# Patient Record
Sex: FEMALE | Race: BLACK OR AFRICAN AMERICAN | Employment: FULL TIME | URBAN - METROPOLITAN AREA
[De-identification: names, ages, dates, MRNs, and addresses within clinical notes are randomized per-mention and may not be internally consistent; named-entity substitution may affect disease eponyms.]

---

## 2017-12-13 ENCOUNTER — INITIAL CONSULT (OUTPATIENT)
Dept: PULMONOLOGY | Age: 37
End: 2017-12-13

## 2017-12-13 VITALS
OXYGEN SATURATION: 98 % | BODY MASS INDEX: 36.98 KG/M2 | SYSTOLIC BLOOD PRESSURE: 118 MMHG | HEIGHT: 68 IN | RESPIRATION RATE: 18 BRPM | WEIGHT: 244 LBS | DIASTOLIC BLOOD PRESSURE: 64 MMHG | HEART RATE: 98 BPM

## 2017-12-13 DIAGNOSIS — Z01.818 PREOP EXAMINATION: Primary | ICD-10-CM

## 2017-12-13 DIAGNOSIS — G47.19 EXCESSIVE DAYTIME SLEEPINESS: ICD-10-CM

## 2017-12-13 DIAGNOSIS — E66.01 MORBID OBESITY (HCC): ICD-10-CM

## 2017-12-13 LAB
DLCO %PRED: NORMAL
DLCO PRE: NORMAL
FEF 25-75%-POST: 2.78
FEF 25-75%-PRE: 2.57
FEV1-POST: 2.23
FEV1-PRE: 2.32
FEV1/FVC-POST: 85.5
FEV1/FVC-PRE: 82.1
FVC-POST: 2.61
FVC-PRE: 2.83
MEP: NORMAL
MIP: NORMAL
TLC %PRED: NORMAL
TLC PRE: NORMAL

## 2017-12-13 PROCEDURE — 99244 OFF/OP CNSLTJ NEW/EST MOD 40: CPT | Performed by: INTERNAL MEDICINE

## 2017-12-13 ASSESSMENT — PULMONARY FUNCTION TESTS
FVC_POST: 2.61
FEV1_PRE: 2.32
FEV1/FVC_POST: 85.5
FEV1_POST: 2.23
FVC_PRE: 2.83
FEV1/FVC_PRE: 82.1

## 2017-12-13 NOTE — PROGRESS NOTES
and general anesthesia. I will obtain outpatient apnea link and if significantly positive would consider a formal sleep study. At this time that should not prevent her from proceeding with surgical intervention. No Follow-up on file. This dictation was performed with a verbal recognition program and it was checked for errors. It is possible that there are still dictated errors within this office note. Any errors should be brought immediately to my attention for correction. All efforts were made to ensure that this office note is accurate.

## 2017-12-18 DIAGNOSIS — E66.01 MORBID OBESITY (HCC): ICD-10-CM

## 2017-12-18 DIAGNOSIS — Z01.818 PREOP EXAMINATION: ICD-10-CM

## 2018-04-11 PROBLEM — Z01.818 PREOP EXAMINATION: Status: RESOLVED | Noted: 2017-12-13 | Resolved: 2018-04-11

## 2018-08-14 PROBLEM — K81.0 ACUTE CHOLECYSTITIS: Status: ACTIVE | Noted: 2018-08-14

## 2018-08-16 PROBLEM — G89.18 ACUTE POST-OPERATIVE PAIN: Status: ACTIVE | Noted: 2018-08-16

## 2019-07-09 ENCOUNTER — HOSPITAL ENCOUNTER (EMERGENCY)
Age: 39
Discharge: HOME | End: 2019-07-10
Payer: COMMERCIAL

## 2019-07-09 ENCOUNTER — APPOINTMENT (OUTPATIENT)
Dept: ULTRASOUND IMAGING | Age: 39
End: 2019-07-09
Payer: COMMERCIAL

## 2019-07-09 ENCOUNTER — APPOINTMENT (OUTPATIENT)
Dept: GENERAL RADIOLOGY | Age: 39
End: 2019-07-09
Payer: COMMERCIAL

## 2019-07-09 ENCOUNTER — APPOINTMENT (OUTPATIENT)
Dept: CT IMAGING | Age: 39
End: 2019-07-09
Payer: COMMERCIAL

## 2019-07-09 VITALS
HEART RATE: 79 BPM | RESPIRATION RATE: 16 BRPM | TEMPERATURE: 98.4 F | OXYGEN SATURATION: 100 % | WEIGHT: 232 LBS | DIASTOLIC BLOOD PRESSURE: 118 MMHG | SYSTOLIC BLOOD PRESSURE: 145 MMHG | BODY MASS INDEX: 35.16 KG/M2 | HEIGHT: 68 IN

## 2019-07-09 DIAGNOSIS — R42 DIZZINESS: ICD-10-CM

## 2019-07-09 DIAGNOSIS — R07.9 CHEST PAIN, UNSPECIFIED TYPE: ICD-10-CM

## 2019-07-09 DIAGNOSIS — R51.9 NONINTRACTABLE HEADACHE, UNSPECIFIED CHRONICITY PATTERN, UNSPECIFIED HEADACHE TYPE: Primary | ICD-10-CM

## 2019-07-09 DIAGNOSIS — R11.0 NAUSEA: ICD-10-CM

## 2019-07-09 LAB
ALBUMIN SERPL-MCNC: 4.3 GM/DL (ref 3.4–5)
ALP BLD-CCNC: 70 IU/L (ref 40–129)
ALT SERPL-CCNC: 15 U/L (ref 10–40)
ANION GAP SERPL CALCULATED.3IONS-SCNC: 10 MMOL/L (ref 4–16)
AST SERPL-CCNC: 19 IU/L (ref 15–37)
BASOPHILS ABSOLUTE: 0 K/CU MM
BASOPHILS RELATIVE PERCENT: 0.2 % (ref 0–1)
BILIRUB SERPL-MCNC: 0.3 MG/DL (ref 0–1)
BUN BLDV-MCNC: 9 MG/DL (ref 6–23)
CALCIUM SERPL-MCNC: 9.5 MG/DL (ref 8.3–10.6)
CHLORIDE BLD-SCNC: 106 MMOL/L (ref 99–110)
CO2: 25 MMOL/L (ref 21–32)
CREAT SERPL-MCNC: 0.9 MG/DL (ref 0.6–1.1)
DIFFERENTIAL TYPE: ABNORMAL
EOSINOPHILS ABSOLUTE: 0.1 K/CU MM
EOSINOPHILS RELATIVE PERCENT: 0.8 % (ref 0–3)
GFR AFRICAN AMERICAN: >60 ML/MIN/1.73M2
GFR NON-AFRICAN AMERICAN: >60 ML/MIN/1.73M2
GLUCOSE BLD-MCNC: 104 MG/DL (ref 70–99)
HCT VFR BLD CALC: 39.4 % (ref 37–47)
HEMOGLOBIN: 13.5 GM/DL (ref 12.5–16)
IMMATURE NEUTROPHIL %: 0.2 % (ref 0–0.43)
LYMPHOCYTES ABSOLUTE: 1.9 K/CU MM
LYMPHOCYTES RELATIVE PERCENT: 21.8 % (ref 24–44)
MCH RBC QN AUTO: 27.9 PG (ref 27–31)
MCHC RBC AUTO-ENTMCNC: 34.3 % (ref 32–36)
MCV RBC AUTO: 81.4 FL (ref 78–100)
MONOCYTES ABSOLUTE: 0.4 K/CU MM
MONOCYTES RELATIVE PERCENT: 4.5 % (ref 0–4)
NUCLEATED RBC %: 0 %
PDW BLD-RTO: 13.8 % (ref 11.7–14.9)
PLATELET # BLD: 249 K/CU MM (ref 140–440)
PMV BLD AUTO: 10.8 FL (ref 7.5–11.1)
POTASSIUM SERPL-SCNC: 3.8 MMOL/L (ref 3.5–5.1)
RBC # BLD: 4.84 M/CU MM (ref 4.2–5.4)
SEGMENTED NEUTROPHILS ABSOLUTE COUNT: 6.3 K/CU MM
SEGMENTED NEUTROPHILS RELATIVE PERCENT: 72.5 % (ref 36–66)
SODIUM BLD-SCNC: 141 MMOL/L (ref 135–145)
TOTAL IMMATURE NEUTOROPHIL: 0.02 K/CU MM
TOTAL NUCLEATED RBC: 0 K/CU MM
TOTAL PROTEIN: 8 GM/DL (ref 6.4–8.2)
TROPONIN T: <0.01 NG/ML
WBC # BLD: 8.6 K/CU MM (ref 4–10.5)

## 2019-07-09 PROCEDURE — 70450 CT HEAD/BRAIN W/O DYE: CPT

## 2019-07-09 PROCEDURE — 99284 EMERGENCY DEPT VISIT MOD MDM: CPT

## 2019-07-09 PROCEDURE — 6370000000 HC RX 637 (ALT 250 FOR IP): Performed by: PHYSICIAN ASSISTANT

## 2019-07-09 PROCEDURE — 84484 ASSAY OF TROPONIN QUANT: CPT

## 2019-07-09 PROCEDURE — 93971 EXTREMITY STUDY: CPT

## 2019-07-09 PROCEDURE — 85025 COMPLETE CBC W/AUTO DIFF WBC: CPT

## 2019-07-09 PROCEDURE — 71045 X-RAY EXAM CHEST 1 VIEW: CPT

## 2019-07-09 PROCEDURE — 2580000003 HC RX 258: Performed by: PHYSICIAN ASSISTANT

## 2019-07-09 PROCEDURE — 96374 THER/PROPH/DIAG INJ IV PUSH: CPT

## 2019-07-09 PROCEDURE — 80053 COMPREHEN METABOLIC PANEL: CPT

## 2019-07-09 PROCEDURE — 6360000002 HC RX W HCPCS: Performed by: PHYSICIAN ASSISTANT

## 2019-07-09 PROCEDURE — 96375 TX/PRO/DX INJ NEW DRUG ADDON: CPT

## 2019-07-09 PROCEDURE — 93005 ELECTROCARDIOGRAM TRACING: CPT | Performed by: PHYSICIAN ASSISTANT

## 2019-07-09 RX ORDER — DEXAMETHASONE SODIUM PHOSPHATE 10 MG/ML
6 INJECTION, SOLUTION INTRAMUSCULAR; INTRAVENOUS ONCE
Status: COMPLETED | OUTPATIENT
Start: 2019-07-09 | End: 2019-07-09

## 2019-07-09 RX ORDER — DIPHENHYDRAMINE HCL 25 MG
25 TABLET ORAL ONCE
Status: COMPLETED | OUTPATIENT
Start: 2019-07-09 | End: 2019-07-09

## 2019-07-09 RX ORDER — 0.9 % SODIUM CHLORIDE 0.9 %
1000 INTRAVENOUS SOLUTION INTRAVENOUS ONCE
Status: COMPLETED | OUTPATIENT
Start: 2019-07-09 | End: 2019-07-10

## 2019-07-09 RX ORDER — METOCLOPRAMIDE HYDROCHLORIDE 5 MG/ML
10 INJECTION INTRAMUSCULAR; INTRAVENOUS ONCE
Status: COMPLETED | OUTPATIENT
Start: 2019-07-09 | End: 2019-07-09

## 2019-07-09 RX ADMIN — SODIUM CHLORIDE 1000 ML: 9 INJECTION, SOLUTION INTRAVENOUS at 23:05

## 2019-07-09 RX ADMIN — METOCLOPRAMIDE 10 MG: 5 INJECTION, SOLUTION INTRAMUSCULAR; INTRAVENOUS at 23:05

## 2019-07-09 RX ADMIN — DEXAMETHASONE SODIUM PHOSPHATE 6 MG: 10 INJECTION, SOLUTION INTRAMUSCULAR; INTRAVENOUS at 23:05

## 2019-07-09 RX ADMIN — DIPHENHYDRAMINE HCL 25 MG: 25 TABLET ORAL at 23:05

## 2019-07-09 ASSESSMENT — PAIN DESCRIPTION - PAIN TYPE: TYPE: ACUTE PAIN

## 2019-07-09 ASSESSMENT — PAIN SCALES - GENERAL: PAINLEVEL_OUTOF10: 10

## 2019-07-09 ASSESSMENT — PAIN DESCRIPTION - LOCATION: LOCATION: HEAD

## 2019-07-09 ASSESSMENT — HEART SCORE: ECG: 0

## 2019-07-10 PROCEDURE — 93010 ELECTROCARDIOGRAM REPORT: CPT | Performed by: INTERNAL MEDICINE

## 2019-07-10 RX ORDER — MECLIZINE HYDROCHLORIDE 25 MG/1
25 TABLET ORAL 3 TIMES DAILY PRN
Qty: 20 TABLET | Refills: 0 | Status: SHIPPED | OUTPATIENT
Start: 2019-07-10 | End: 2019-07-20

## 2019-07-10 RX ORDER — ONDANSETRON 4 MG/1
4 TABLET, ORALLY DISINTEGRATING ORAL EVERY 6 HOURS
Qty: 10 TABLET | Refills: 0 | Status: SHIPPED | OUTPATIENT
Start: 2019-07-10 | End: 2021-06-28

## 2019-07-11 LAB
EKG ATRIAL RATE: 79 BPM
EKG DIAGNOSIS: NORMAL
EKG P AXIS: 39 DEGREES
EKG P-R INTERVAL: 188 MS
EKG Q-T INTERVAL: 364 MS
EKG QRS DURATION: 86 MS
EKG QTC CALCULATION (BAZETT): 417 MS
EKG R AXIS: 1 DEGREES
EKG T AXIS: 9 DEGREES
EKG VENTRICULAR RATE: 79 BPM

## 2020-12-08 PROCEDURE — 36415 COLL VENOUS BLD VENIPUNCTURE: CPT

## 2020-12-08 PROCEDURE — 85025 COMPLETE CBC W/AUTO DIFF WBC: CPT

## 2020-12-08 PROCEDURE — 96375 TX/PRO/DX INJ NEW DRUG ADDON: CPT

## 2020-12-08 PROCEDURE — 99285 EMERGENCY DEPT VISIT HI MDM: CPT

## 2020-12-08 PROCEDURE — 83690 ASSAY OF LIPASE: CPT

## 2020-12-08 PROCEDURE — 96374 THER/PROPH/DIAG INJ IV PUSH: CPT

## 2020-12-08 PROCEDURE — 80053 COMPREHEN METABOLIC PANEL: CPT

## 2020-12-08 PROCEDURE — 82248 BILIRUBIN DIRECT: CPT

## 2020-12-08 RX ORDER — DIPHENHYDRAMINE HYDROCHLORIDE 50 MG/ML
25 INJECTION INTRAMUSCULAR; INTRAVENOUS ONCE
Status: COMPLETED | OUTPATIENT
Start: 2020-12-08 | End: 2020-12-09

## 2020-12-08 RX ORDER — HALOPERIDOL 5 MG/ML
5 INJECTION INTRAMUSCULAR ONCE
Status: COMPLETED | OUTPATIENT
Start: 2020-12-08 | End: 2020-12-09

## 2020-12-08 ASSESSMENT — PAIN DESCRIPTION - LOCATION: LOCATION: ABDOMEN

## 2020-12-08 ASSESSMENT — PAIN SCALES - GENERAL: PAINLEVEL_OUTOF10: 10

## 2020-12-08 ASSESSMENT — PAIN DESCRIPTION - PAIN TYPE: TYPE: ACUTE PAIN

## 2020-12-09 ENCOUNTER — HOSPITAL ENCOUNTER (EMERGENCY)
Age: 40
Discharge: HOME | End: 2020-12-09
Payer: COMMERCIAL

## 2020-12-09 VITALS
OXYGEN SATURATION: 100 % | TEMPERATURE: 98.2 F | RESPIRATION RATE: 18 BRPM | DIASTOLIC BLOOD PRESSURE: 94 MMHG | SYSTOLIC BLOOD PRESSURE: 155 MMHG | WEIGHT: 240 LBS | HEART RATE: 65 BPM | HEIGHT: 68 IN | BODY MASS INDEX: 36.37 KG/M2

## 2020-12-09 LAB
ALBUMIN SERPL-MCNC: 4.3 GM/DL (ref 3.4–5)
ALP BLD-CCNC: 67 IU/L (ref 40–129)
ALT SERPL-CCNC: 10 U/L (ref 10–40)
ANION GAP SERPL CALCULATED.3IONS-SCNC: 11 MMOL/L (ref 4–16)
AST SERPL-CCNC: 16 IU/L (ref 15–37)
BASOPHILS ABSOLUTE: 0 K/CU MM
BASOPHILS RELATIVE PERCENT: 0.2 % (ref 0–1)
BILIRUB SERPL-MCNC: 0.3 MG/DL (ref 0–1)
BILIRUBIN DIRECT: 0.2 MG/DL (ref 0–0.3)
BILIRUBIN, INDIRECT: 0.1 MG/DL (ref 0–0.7)
BUN BLDV-MCNC: 12 MG/DL (ref 6–23)
CALCIUM SERPL-MCNC: 9.1 MG/DL (ref 8.3–10.6)
CHLORIDE BLD-SCNC: 99 MMOL/L (ref 99–110)
CO2: 25 MMOL/L (ref 21–32)
CREAT SERPL-MCNC: 0.9 MG/DL (ref 0.6–1.1)
DIFFERENTIAL TYPE: ABNORMAL
EOSINOPHILS ABSOLUTE: 0.1 K/CU MM
EOSINOPHILS RELATIVE PERCENT: 1.2 % (ref 0–3)
GFR AFRICAN AMERICAN: >60 ML/MIN/1.73M2
GFR NON-AFRICAN AMERICAN: >60 ML/MIN/1.73M2
GLUCOSE BLD-MCNC: 105 MG/DL (ref 70–99)
HCT VFR BLD CALC: 39 % (ref 37–47)
HEMOGLOBIN: 13.9 GM/DL (ref 12.5–16)
IMMATURE NEUTROPHIL %: 0.4 % (ref 0–0.43)
LIPASE: 28 IU/L (ref 13–60)
LYMPHOCYTES ABSOLUTE: 2.4 K/CU MM
LYMPHOCYTES RELATIVE PERCENT: 28.7 % (ref 24–44)
MCH RBC QN AUTO: 28.3 PG (ref 27–31)
MCHC RBC AUTO-ENTMCNC: 35.6 % (ref 32–36)
MCV RBC AUTO: 79.4 FL (ref 78–100)
MONOCYTES ABSOLUTE: 0.5 K/CU MM
MONOCYTES RELATIVE PERCENT: 5.5 % (ref 0–4)
NUCLEATED RBC %: 0 %
PDW BLD-RTO: 13.1 % (ref 11.7–14.9)
PLATELET # BLD: 292 K/CU MM (ref 140–440)
PMV BLD AUTO: 10.1 FL (ref 7.5–11.1)
POTASSIUM SERPL-SCNC: 3.5 MMOL/L (ref 3.5–5.1)
RBC # BLD: 4.91 M/CU MM (ref 4.2–5.4)
SEGMENTED NEUTROPHILS ABSOLUTE COUNT: 5.4 K/CU MM
SEGMENTED NEUTROPHILS RELATIVE PERCENT: 64 % (ref 36–66)
SODIUM BLD-SCNC: 135 MMOL/L (ref 135–145)
TOTAL IMMATURE NEUTOROPHIL: 0.03 K/CU MM
TOTAL NUCLEATED RBC: 0 K/CU MM
TOTAL PROTEIN: 7.9 GM/DL (ref 6.4–8.2)
WBC # BLD: 8.4 K/CU MM (ref 4–10.5)

## 2020-12-09 PROCEDURE — 6360000002 HC RX W HCPCS: Performed by: EMERGENCY MEDICINE

## 2020-12-09 PROCEDURE — 6370000000 HC RX 637 (ALT 250 FOR IP): Performed by: PHYSICIAN ASSISTANT

## 2020-12-09 RX ADMIN — DIPHENHYDRAMINE HYDROCHLORIDE 25 MG: 50 INJECTION INTRAMUSCULAR; INTRAVENOUS at 00:43

## 2020-12-09 RX ADMIN — HALOPERIDOL LACTATE 5 MG: 5 INJECTION, SOLUTION INTRAMUSCULAR at 00:43

## 2020-12-09 RX ADMIN — HYOSCYAMINE SULFATE 125 MCG: 0.12 TABLET ORAL; SUBLINGUAL at 02:34

## 2020-12-09 NOTE — ED NOTES
Reviewed discharge instructions with patient. Patient verbalized understanding. All questions answered.       Jesus Auguste RN  12/09/20 4491

## 2020-12-09 NOTE — ED PROVIDER NOTES
EMERGENCY DEPARTMENT ENCOUNTER      PCP: Jr Swartz PA-C    CHIEF COMPLAINT    Chief Complaint   Patient presents with    Abdominal Pain     x 1 year       This patient was not evaluated by the attending physician. I have independently evaluated this patient. My supervising physician was available for consultation. HPI    Alexis Boyce is a 36 y.o. female who presents with abdominal pain. Onset -about 1 year ago but has been worse the last few days  Location -right lower quadrant of abdomen  Duration -intermittent  Character -cramping, stabbing, \"contraction-like pain\"  Aggravating/Alleviating factors -aggravating factor is eating. Alleviating factors are denied. Patient reports that she was discharged on Linzess today by her GI provider. Associate symptoms -yesterday patient reports that she had nausea and an episode of emesis that was nonbloody and nonbilious but denies any nausea or vomiting today and has been able to tolerate liquids by mouth today  Radiation - does not radiate  Severity - 10/10    Patient denies urinary symptoms, fever, chills, chest pain, shortness of breath, constipation, melena, hematochezia, hematemesis, concern for pregnancy. REVIEW OF SYSTEMS    Constitutional:  Denies fever, chills  HENT:  Denies sore throat or ear pain   Cardiovascular:  Denies chest pain, palpitations or swelling   Respiratory:  Denies cough or shortness of breath   GI:  See HPI above  : No hematuria, urinary urgency, urinary frequency, dysuria. No vaginal discharge or concern for STDs. Musculoskeletal:  Denies back pain or groin pain or masses. No pain or swelling of extremities.   Skin:  Denies rash  Neurologic:  Denies headache, focal weakness or sensory changes   Endocrine:  Denies polyuria or polydypsia   Lymphatic:  Denies swollen glands     All other review of systems are negative  See HPI and nursing notes for additional information     [address]    Past Medical History:   Diagnosis Date    Excessive daytime sleepiness 2017    GERD (gastroesophageal reflux disease)     Hypertension     Morbid obesity ([address] 2017    Preop examination 2017     Past Surgical History:   Procedure Laterality Date     SECTION      CHOLECYSTECTOMY  08/15/2018    laprascopic    UPPER GASTROINTESTINAL ENDOSCOPY  2017       CURRENT MEDICATIONS    Current Outpatient Rx   Medication Sig Dispense Refill    hyoscyamine (LEVSIN/SL) 125 MCG sublingual tablet Place 1 tablet under the tongue every 4 hours as needed for Cramping 42 tablet 0    ondansetron (ZOFRAN ODT) 4 MG disintegrating tablet Take 1 tablet by mouth every 6 hours 10 tablet 0    omeprazole (PRILOSEC) 40 MG delayed release capsule Take 40 mg by mouth daily      norethindrone (NORLYDA) 0.35 MG tablet Take 1 tablet by mouth daily      amLODIPine (NORVASC) 10 MG tablet Take 10 mg by mouth      gabapentin (NEURONTIN) 300 MG capsule Take 300 mg by mouth 3 times daily. [place] hydrochlorothiazide (MICROZIDE) 12.5 MG capsule take 1 capsule daily  3    ibuprofen (ADVIL;MOTRIN) 800 MG tablet Take 1 tablet by mouth every 8 hours as needed.  (Patient taking differently: Take 800 mg by mouth 3 times daily ) 30 tablet 1       ALLERGIES    No Known Allergies    SOCIAL AND FAMILY HISTORY    Social History     Socioeconomic History    Marital status: Single     Spouse name: None    Number of children: None    Years of education: None    Highest education level: None   Occupational History    None   Social Needs    Financial resource strain: None    Food insecurity     Worry: None     Inability: None    Transportation needs     Medical: None     Non-medical: None   Tobacco Use    Smoking status: Never Smoker    Smokeless tobacco: Never Used   Substance and Sexual Activity    Alcohol use: Yes     Comment: social    Drug use: No    Sexual activity: None   Lifestyle    Physical activity     Days per week: None     Minutes per session: None    Stress: None   Relationships    Social connections     Talks on phone: None     Gets together: None     Attends Shinto service: None     Active member of club or organization: None     Attends meetings of clubs or organizations: None     Relationship status: None    Intimate partner violence     Fear of current or ex partner: None     Emotionally abused: None     Physically abused: None     Forced sexual activity: None   Other Topics Concern    None   Social History Narrative    None     Family History   Problem Relation Age of Onset    Hearing Loss Brother     High Blood Pressure Maternal Grandmother     Arthritis Maternal Grandfather        PHYSICAL EXAM    VITAL SIGNS: BP (!) 155/94   Pulse 65   Temp 98.2 °F (36.8 °C)   Resp 18   Ht 5' 8\" (1.727 m)   Wt 240 lb (108.9 kg)   LMP 06/15/2019   SpO2 100%   BMI 36.49 kg/m²   Constitutional:  Well developed, well nourished. No distress  Eyes:  Sclera nonicteric, conjunctiva moist  HENT:  Atraumatic. PERRL. EOMI. Moist mucus membranes. Neck/Lymphatics: supple, no JVD, no swollen nodes  Respiratory:  No retractions, no accessory muscle use, normal breath sounds   Cardiovascular:  normal rate, normal rhythm, no murmurs    GI:    No gross discoloration.       -no Lcaude's sign (periumbilical ecchymosis)       -no Grey-Biggs's sign (flank ecchymosis)    Bowel sounds present, no audible bruits. Soft, no distention, no guarding, no rigidity,   + mild right lower quadrant abdominal tenderness to palpation-no other abdominal tenderness palpation on exam, no rebound tenderness, no palpable pulsatile masses,   No increase tenderness palpation over McBurney's point    Negative Rovsing sign    Negative Blanca's sign. Back:  No CVA tenderness to percussion.   Musculoskeletal:  No edema, no deformity  Vascular: DP pulses 2+ equal bilaterally  Integument: No rash, dry skin  Neurologic:  Alert & oriented, normal speech  Psychiatric: Cooperative, pleasant affect       LABS:  Results for orders placed or performed during the hospital encounter of 12/09/20   CBC auto diff   Result Value Ref Range    WBC 8.4 4.0 - 10.5 K/CU MM    RBC 4.91 4.2 - 5.4 M/CU MM    Hemoglobin 13.9 12.5 - 16.0 GM/DL    Hematocrit 39.0 37 - 47 %    MCV 79.4 78 - 100 FL    MCH 28.3 27 - 31 PG    MCHC 35.6 32.0 - 36.0 %    RDW 13.1 11.7 - 14.9 %    Platelets 218 884 - 159 K/CU MM    MPV 10.1 7.5 - 11.1 FL    Differential Type AUTOMATED DIFFERENTIAL     Segs Relative 64.0 36 - 66 %    Lymphocytes % 28.7 24 - 44 %    Monocytes % 5.5 (H) 0 - 4 %    Eosinophils % 1.2 0 - 3 %    Basophils % 0.2 0 - 1 %    Segs Absolute 5.4 K/CU MM    Lymphocytes Absolute 2.4 K/CU MM    Monocytes Absolute 0.5 K/CU MM    Eosinophils Absolute 0.1 K/CU MM    Basophils Absolute 0.0 K/CU MM    Nucleated RBC % 0.0 %    Total Nucleated RBC 0.0 K/CU MM    Total Immature Neutrophil 0.03 K/CU MM    Immature Neutrophil % 0.4 0 - 0.43 %   BMP   Result Value Ref Range    Sodium 135 135 - 145 MMOL/L    Potassium 3.5 3.5 - 5.1 MMOL/L    Chloride 99 99 - 110 mMol/L    CO2 25 21 - 32 MMOL/L    Anion Gap 11 4 - 16    BUN 12 6 - 23 MG/DL    CREATININE 0.9 0.6 - 1.1 MG/DL    Glucose 105 (H) 70 - 99 MG/DL    Calcium 9.1 8.3 - 10.6 MG/DL    GFR Non-African American >60 >60 mL/min/1.73m2    GFR African American >60 >60 mL/min/1.73m2   Hepatic Function Panel (LFTs)   Result Value Ref Range    Alb 4.3 3.4 - 5.0 GM/DL    Bilirubin, Direct 0.2 0.0 - 0.3 MG/DL    Alkaline Phosphatase 67 40 - 129 IU/L    AST 16 15 - 37 IU/L    ALT 10 10 - 40 U/L    Total Protein 7.9 6.4 - 8.2 GM/DL   Lipase   Result Value Ref Range    Lipase 28 13 - 60 IU/L           RADIOLOGY/PROCEDURES    No orders to display            ED COURSE & MEDICAL DECISION MAKING       Vital signs and nursing notes reviewed during ED course. I have independently evaluated this patient.  Supervising physician present in the Emergency Department, available for consultation, throughout entirety of patient care. Patient presents as above which prompted work-up today. Labs were obtained. Labs are without emergent findings. Patient initially treated with Haldol and Benadryl without improvement in symptoms. Patient reports she has not had any vomiting since yesterday. She is tolerating oral intake. Abdominal exam without peritoneal signs. Patient treated with Levsin and she would like to be discharged home immediately after taking this medication. Patient reports that she just charted Leila Carty today for IBS and she is following with GI. Patient reports her symptoms but have been ongoing for about a year. Patient recently seen in November for similar symptoms at outside hospital with negative CT of abdomen pelvis without evidence of acute appendicitis. Given the patient's symptoms are similar to prior episodes of abdominal pain and she is without peritoneal abdominal findings and without leukocytosis-I do of low clinical suspicion for acute appendicitis. Patient has no increased tenderness palpation over McBurney's point and has negative Rovsing sign. Patient is nontoxic appearing. Vital signs stable. Patient is stable for outpatient management. All pertinent Lab data and radiographic results reviewed with patient at bedside. Patient we discharged home with prescription for Levsin-we discussed medication. The patient and/or the family were informed of the results of any tests/labs/imaging, the treatment plan, and time was allotted to answer questions. Diagnosis, disposition, and treatment plan reviewed in detail with patient. Patient understands and agrees to follow-up with her GI specialist for recheck as soon as possible. Patient understands and agrees to return to emergency department for any new or worsening symptoms - strict return precautions given. Clinical  IMPRESSION    1.  Abdominal pain, unspecified abdominal location Disposition referral (if applicable): Your GI provider    Schedule an appointment as soon as possible for a visit   Recheck as soon as possible    [facility] Emergency Department  [address] [phone]  [phone]  Go to   Return to ED if symptoms worsen or new symptoms      Disposition medications (if applicable):  Discharge Medication List as of 12/9/2020  2:28 AM            Comment: Please note this report has been produced using speech recognition software and may contain errors related to that system including errors in grammar, punctuation, and spelling, as well as words and phrases that may be inappropriate. If there are any questions or concerns please feel free to contact the dictating provider for clarification.         Jeannine Blackmon PA-C  12/09/20 5579

## 2020-12-09 NOTE — ED PROVIDER NOTES
As physician-in-triage, I performed a virtual medical screening history and physical exam on this patient. HISTORY OF PRESENT ILLNESS  Vinicius Mata is a 36 y.o. female who presents to the emergency department with complaints of lower abdominal/pelvic pain. The pain described as a cramping and stabbing pain rated as moderate to severe in severity. The pain occurs and paroxysmal that she likens it to contractions although she is status post hysterectomy. She has associated nausea and vomiting. Has been evaluated for this several times previously with normal CT imaging in November of this year. Denies fevers, chills, vaginal bleeding, vaginal discharge, or dysuria. PHYSICAL EXAM  BP (!) 188/122   Pulse 60   Temp 98.2 °F (36.8 °C)   Resp 18   Ht 5' 8\" (1.727 m)   Wt 240 lb (108.9 kg)   LMP 06/15/2019   SpO2 100%   BMI 36.49 kg/m²     On exam, the patient appears in no acute distress. Speech is clear. Breathing is unlabored.   Moves all extremities    Orders: CBC, BMP, hepatic panel, lipase, urinalysis, Haldol, Benadryl        Everton Arcos DO  12/08/20 2200

## 2021-06-08 ENCOUNTER — TELEPHONE (OUTPATIENT)
Dept: SURGERY | Age: 41
End: 2021-06-08

## 2021-06-24 ENCOUNTER — OFFICE VISIT (OUTPATIENT)
Dept: SURGERY | Age: 41
End: 2021-06-24
Payer: COMMERCIAL

## 2021-06-24 VITALS
BODY MASS INDEX: 35.09 KG/M2 | WEIGHT: 231.5 LBS | SYSTOLIC BLOOD PRESSURE: 144 MMHG | HEART RATE: 73 BPM | HEIGHT: 68 IN | OXYGEN SATURATION: 98 % | TEMPERATURE: 97.2 F | DIASTOLIC BLOOD PRESSURE: 96 MMHG

## 2021-06-24 DIAGNOSIS — K43.2 INCISIONAL HERNIA, WITHOUT OBSTRUCTION OR GANGRENE: Primary | ICD-10-CM

## 2021-06-24 PROCEDURE — G8427 DOCREV CUR MEDS BY ELIG CLIN: HCPCS | Performed by: SURGERY

## 2021-06-24 PROCEDURE — G8417 CALC BMI ABV UP PARAM F/U: HCPCS | Performed by: SURGERY

## 2021-06-24 PROCEDURE — 1036F TOBACCO NON-USER: CPT | Performed by: SURGERY

## 2021-06-24 PROCEDURE — 99204 OFFICE O/P NEW MOD 45 MIN: CPT | Performed by: SURGERY

## 2021-06-24 ASSESSMENT — ENCOUNTER SYMPTOMS
STRIDOR: 0
SORE THROAT: 0
EYE REDNESS: 0
APNEA: 0
BACK PAIN: 0
ABDOMINAL PAIN: 1
CHOKING: 0
EYE ITCHING: 0
RECTAL PAIN: 0
PHOTOPHOBIA: 0
CONSTIPATION: 1
COLOR CHANGE: 0
ANAL BLEEDING: 0

## 2021-06-24 NOTE — PROGRESS NOTES
Chief Complaint   Patient presents with    New Patient     NP RLQ pain ref Dr Jones Ala:  HPI:  Patient complains of abdominal pain. Pain is located in the RLQ with no radiation. The pain is described as colicky, cramping and pressure-like. Onset was several years ago. Symptoms have been gradually worsening since. Aggravating factors: eating and fatty foods. Associated symptoms: constipation. The patient denies chills and fever. I have reviewed the patient's(pertinent information to this visit) medical history, family history(scanned in  the Media tab under \"patient questioner\"), social history and reviewof systems with the patient today in the office.        Past Surgical History:   Procedure Laterality Date     SECTION      CHOLECYSTECTOMY  08/15/2018    laprascopic    UPPER GASTROINTESTINAL ENDOSCOPY  2017     Past Medical History:   Diagnosis Date    Excessive daytime sleepiness 2017    GERD (gastroesophageal reflux disease)     Hypertension     Morbid [address] [address] 2017    Preop examination 2017     Family History   Problem Relation Age of Onset    Hypertension Mother     Hypertension Father     Hearing Loss Brother     High Blood Pressure Maternal Grandmother     Arthritis Maternal Grandfather      Social History     Socioeconomic History    Marital status: Single     Spouse name: Not on file    Number of children: Not on file    Years of education: Not on file    Highest education level: Not on file   Occupational History    Not on file   Tobacco Use    Smoking status: Never Smoker    Smokeless tobacco: Never Used   Substance and Sexual Activity    Alcohol use: Yes     Comment: social    Drug use: No    Sexual activity: Not on file   Other Topics Concern    Not on file   Social History Narrative    Not on file     Social Determinants of Health     Financial Resource Strain: Low Risk     Difficulty of Paying Living Expenses: Not hard for environmental allergies. Neurological: Negative for syncope and speech difficulty. Psychiatric/Behavioral: Negative for confusion and hallucinations. OBJECTIVE:  Physical Exam:    BP (!) 144/96   Pulse 73   Temp 97.2 °F (36.2 °C)   Ht 5' 8\" (1.727 m)   Wt 231 lb 8 oz (105 kg)   LMP 06/15/2019   SpO2 98%   BMI 35.20 kg/m²      Physical Exam  Constitutional:       Appearance: She is well-developed. HENT:      Head: Normocephalic. Eyes:      Pupils: Pupils are equal, round, and reactive to light. Cardiovascular:      Rate and Rhythm: Normal rate. Pulmonary:      Effort: Pulmonary effort is normal.   Abdominal:      General: There is no distension. Palpations: Abdomen is soft. There is no mass. Tenderness: There is abdominal tenderness. There is no guarding or rebound. Musculoskeletal:         General: Normal range of motion. Cervical back: Normal range of motion and neck supple. Skin:     General: Skin is warm. Neurological:      Mental Status: She is alert and oriented to person, place, and time. ASSESSMENT:  1. Incisional hernia, without obstruction or gangrene        PLAN:  Treatment: CT of abdomen done at [facility] reviewed and unsure if she has incisional hernia on the right lower quadrant from her  incision. Patient will need exploratory robotic/laparoscopy for evaluation. If patient has incisional hernia I will repair it at the same time. These findings were discussed with patient and she agrees to proceed. Patient counseled on risks, benefits, and alternatives of treatment plan at length today. Patient states an understanding and willingness to proceed with plan. No orders of the defined types were placed in this encounter. No orders of the defined types were placed in this encounter. Follow Up:  No follow-ups on file.       Emilio Pelayo MD

## 2021-06-28 ENCOUNTER — OFFICE VISIT (OUTPATIENT)
Dept: FAMILY MEDICINE CLINIC | Age: 41
End: 2021-06-28
Payer: COMMERCIAL

## 2021-06-28 VITALS
HEIGHT: 68 IN | SYSTOLIC BLOOD PRESSURE: 126 MMHG | OXYGEN SATURATION: 93 % | BODY MASS INDEX: 34.56 KG/M2 | HEART RATE: 75 BPM | DIASTOLIC BLOOD PRESSURE: 71 MMHG | RESPIRATION RATE: 16 BRPM | WEIGHT: 228 LBS

## 2021-06-28 DIAGNOSIS — E66.09 CLASS 1 OBESITY DUE TO EXCESS CALORIES WITHOUT SERIOUS COMORBIDITY WITH BODY MASS INDEX (BMI) OF 34.0 TO 34.9 IN ADULT: ICD-10-CM

## 2021-06-28 DIAGNOSIS — I10 ESSENTIAL HYPERTENSION: ICD-10-CM

## 2021-06-28 DIAGNOSIS — Z13.1 ENCOUNTER FOR SCREENING FOR DIABETES MELLITUS: ICD-10-CM

## 2021-06-28 DIAGNOSIS — K21.9 GASTROESOPHAGEAL REFLUX DISEASE, UNSPECIFIED WHETHER ESOPHAGITIS PRESENT: ICD-10-CM

## 2021-06-28 DIAGNOSIS — Z76.89 ENCOUNTER TO ESTABLISH CARE: ICD-10-CM

## 2021-06-28 DIAGNOSIS — K58.1 IRRITABLE BOWEL SYNDROME WITH CONSTIPATION: ICD-10-CM

## 2021-06-28 DIAGNOSIS — Z76.89 ENCOUNTER TO ESTABLISH CARE: Primary | ICD-10-CM

## 2021-06-28 LAB
A/G RATIO: 1.3 (ref 1.1–2.2)
ALBUMIN SERPL-MCNC: 4.4 G/DL (ref 3.4–5)
ALP BLD-CCNC: 69 U/L (ref 40–129)
ALT SERPL-CCNC: 7 U/L (ref 10–40)
ANION GAP SERPL CALCULATED.3IONS-SCNC: 14 MMOL/L (ref 3–16)
AST SERPL-CCNC: 23 U/L (ref 15–37)
BASOPHILS ABSOLUTE: 0 K/UL (ref 0–0.2)
BASOPHILS RELATIVE PERCENT: 0.3 %
BILIRUB SERPL-MCNC: 0.5 MG/DL (ref 0–1)
BUN BLDV-MCNC: 10 MG/DL (ref 7–20)
CALCIUM SERPL-MCNC: 9.4 MG/DL (ref 8.3–10.6)
CHLORIDE BLD-SCNC: 103 MMOL/L (ref 99–110)
CHOLESTEROL, FASTING: 151 MG/DL (ref 0–199)
CO2: 22 MMOL/L (ref 21–32)
CREAT SERPL-MCNC: 0.8 MG/DL (ref 0.6–1.1)
EOSINOPHILS ABSOLUTE: 0 K/UL (ref 0–0.6)
EOSINOPHILS RELATIVE PERCENT: 0.6 %
GFR AFRICAN AMERICAN: >60
GFR NON-AFRICAN AMERICAN: >60
GLOBULIN: 3.5 G/DL
GLUCOSE BLD-MCNC: 73 MG/DL (ref 70–99)
HCT VFR BLD CALC: 39 % (ref 36–48)
HDLC SERPL-MCNC: 39 MG/DL (ref 40–60)
HEMOGLOBIN: 13.6 G/DL (ref 12–16)
LDL CHOLESTEROL CALCULATED: 101 MG/DL
LYMPHOCYTES ABSOLUTE: 2.2 K/UL (ref 1–5.1)
LYMPHOCYTES RELATIVE PERCENT: 26.7 %
MCH RBC QN AUTO: 28.5 PG (ref 26–34)
MCHC RBC AUTO-ENTMCNC: 34.9 G/DL (ref 31–36)
MCV RBC AUTO: 81.9 FL (ref 80–100)
MONOCYTES ABSOLUTE: 0.4 K/UL (ref 0–1.3)
MONOCYTES RELATIVE PERCENT: 4.3 %
NEUTROPHILS ABSOLUTE: 5.7 K/UL (ref 1.7–7.7)
NEUTROPHILS RELATIVE PERCENT: 68.1 %
PDW BLD-RTO: 14 % (ref 12.4–15.4)
PLATELET # BLD: 266 K/UL (ref 135–450)
PMV BLD AUTO: 8.9 FL (ref 5–10.5)
POTASSIUM SERPL-SCNC: 4.1 MMOL/L (ref 3.5–5.1)
RBC # BLD: 4.76 M/UL (ref 4–5.2)
SODIUM BLD-SCNC: 139 MMOL/L (ref 136–145)
TOTAL PROTEIN: 7.9 G/DL (ref 6.4–8.2)
TRIGLYCERIDE, FASTING: 57 MG/DL (ref 0–150)
TSH REFLEX FT4: 2.17 UIU/ML (ref 0.27–4.2)
VLDLC SERPL CALC-MCNC: 11 MG/DL
WBC # BLD: 8.4 K/UL (ref 4–11)

## 2021-06-28 PROCEDURE — 99204 OFFICE O/P NEW MOD 45 MIN: CPT | Performed by: STUDENT IN AN ORGANIZED HEALTH CARE EDUCATION/TRAINING PROGRAM

## 2021-06-28 RX ORDER — LISINOPRIL 20 MG/1
TABLET ORAL
COMMUNITY
Start: 2021-06-15 | End: 2022-07-19

## 2021-06-28 RX ORDER — LINACLOTIDE 145 UG/1
CAPSULE, GELATIN COATED ORAL
COMMUNITY
Start: 2021-04-07

## 2021-06-28 SDOH — ECONOMIC STABILITY: FOOD INSECURITY: WITHIN THE PAST 12 MONTHS, YOU WORRIED THAT YOUR FOOD WOULD RUN OUT BEFORE YOU GOT MONEY TO BUY MORE.: NEVER TRUE

## 2021-06-28 SDOH — ECONOMIC STABILITY: FOOD INSECURITY: WITHIN THE PAST 12 MONTHS, THE FOOD YOU BOUGHT JUST DIDN'T LAST AND YOU DIDN'T HAVE MONEY TO GET MORE.: NEVER TRUE

## 2021-06-28 ASSESSMENT — ENCOUNTER SYMPTOMS
NAUSEA: 0
WHEEZING: 0
SORE THROAT: 0
SHORTNESS OF BREATH: 0

## 2021-06-28 ASSESSMENT — PATIENT HEALTH QUESTIONNAIRE - PHQ9
SUM OF ALL RESPONSES TO PHQ9 QUESTIONS 1 & 2: 0
1. LITTLE INTEREST OR PLEASURE IN DOING THINGS: 0
2. FEELING DOWN, DEPRESSED OR HOPELESS: 0
SUM OF ALL RESPONSES TO PHQ QUESTIONS 1-9: 0

## 2021-06-28 ASSESSMENT — SOCIAL DETERMINANTS OF HEALTH (SDOH): HOW HARD IS IT FOR YOU TO PAY FOR THE VERY BASICS LIKE FOOD, HOUSING, MEDICAL CARE, AND HEATING?: NOT HARD AT ALL

## 2021-06-28 NOTE — PROGRESS NOTES
7/2/2021    [address]    Chief Complaint   Patient presents with    New Patient    Abdominal Pain     RLQ Pain seeing Dr. Paola Munoz and Dr. Davey Adkins. HPI  History was obtained from patient. Karolyn Dakins is a 39 y.o. female with a PMHx Fibromyalgia, IBS with constipation, Hypertension, GERD,  s/p hysterectomy, s/p cholecystectomy   who presents today to establish care  No acute complaints, complaint with medications    Follows with Dr. Summer Pratt hx. Of fibromyalgia  Follows with Dr. Paola Munoz IBS. Patient is complaining of RLQ pain, ongoing for years. Pain conisistent for nearly 2 years. Scheduled for exploratory lap with Dr. Davey Adkins next 1-2 months. Has been on levsin for this in the past which did not help. Described as Stabbing pain 'feels like a contraction' cramping, sharp pain that is intermittent. Pain occurs around every 2 hours, cyclic. Somewhat improved with bowel movements. Hx. Of IBS with constipation per patient unable to move bowel without linzess which she takes every other day. Notes pain after meals, roughly 12 pounds lost since 12/8/2020. 3 pregnancies, 2 kids. Lives by herself with 1 child  Works as [employer].       Prior PCP [facility]    1. Encounter to establish care    2. Gastroesophageal reflux disease, unspecified whether esophagitis present    3. Essential hypertension    4. Irritable bowel syndrome with constipation    5. Class 1 obesity due to excess calories without serious comorbidity with body mass index (BMI) of 34.0 to 34.9 in adult    6. Encounter for screening for diabetes mellitus             REVIEW OF SYMPTOMS    Review of Systems   Constitutional: Negative for chills and fatigue. HENT: Negative for congestion and sore throat. Respiratory: Negative for shortness of breath and wheezing. Cardiovascular: Negative for chest pain and palpitations. Gastrointestinal: Positive for abdominal pain. Negative for nausea.         +heartburn Genitourinary: Negative for frequency and urgency. Neurological: Negative for light-headedness. PAST MEDICAL HISTORY  Past Medical History:   Diagnosis Date    Excessive daytime sleepiness 12/13/2017    GERD (gastroesophageal reflux disease)     Hypertension     Morbid obesity ([address] 12/13/2017    Preop examination 12/13/2017       FAMILY HISTORY  Family History   Problem Relation Age of Onset    Hypertension Mother     Hypertension Father     Hearing Loss Brother     High Blood Pressure Maternal Grandmother     Arthritis Maternal Grandfather        SOCIAL HISTORY  Social History     Socioeconomic History    Marital status: Single     Spouse name: None    Number of children: None    Years of education: None    Highest education level: None   Occupational History    None   Tobacco Use    Smoking status: Never Smoker    Smokeless tobacco: Never Used   Substance and Sexual Activity    Alcohol use: Yes     Comment: social    Drug use: No    Sexual activity: None   Other Topics Concern    None   Social History Narrative    None     Social Determinants of Health     Financial Resource Strain: Low Risk     Difficulty of Paying Living Expenses: Not hard at all   Food Insecurity: No Food Insecurity    Worried About Running Out of Food in the Last Year: Never true    Janneth of Food in the Last Year: Never true   Transportation Needs:     Lack of Transportation (Medical):      Lack of Transportation (Non-Medical):    Physical Activity:     Days of Exercise per Week:     Minutes of Exercise per Session:    Stress:     Feeling of Stress :    Social Connections:     Frequency of Communication with Friends and Family:     Frequency of Social Gatherings with Friends and Family:     Attends Yarsanism Services:     Active Member of Clubs or Organizations:     Attends Club or Organization Meetings:     Marital Status:    Intimate Partner Violence:     Fear of Current or Ex-Partner:     Emotionally Abused:     Physically Abused:     Sexually Abused:         SURGICAL HISTORY  Past Surgical History:   Procedure Laterality Date     SECTION      CHOLECYSTECTOMY  08/15/2018    laprascopic    UPPER GASTROINTESTINAL ENDOSCOPY  2017                 CURRENT MEDICATIONS  Current Outpatient Medications   Medication Sig Dispense Refill    LINZESS 145 MCG capsule       lisinopril (PRINIVIL;ZESTRIL) 20 MG tablet       Indomethacin (INDOCIN PO) Take by mouth PRN      Acetaminophen (TYLENOL 8 HOUR PO) Take by mouth      omeprazole (PRILOSEC) 40 MG delayed release capsule Take 40 mg by mouth daily       No current facility-administered medications for this visit. ALLERGIES  No Known Allergies    PHYSICAL EXAM    /71   Pulse 75   Resp 16   Ht 5' 8\" (1.727 m)   Wt 228 lb (103.4 kg)   LMP 06/15/2019   SpO2 93%   BMI 34.67 kg/m²     Physical Exam  Constitutional:       Appearance: Normal appearance. HENT:      Head: Normocephalic and atraumatic. Eyes:      Extraocular Movements: Extraocular movements intact. Pupils: Pupils are equal, round, and reactive to light. Neck:      Comments: No thyromegaly  Cardiovascular:      Rate and Rhythm: Normal rate and regular rhythm. Pulses: Normal pulses. Heart sounds: No murmur heard. No friction rub. No gallop. Pulmonary:      Effort: Pulmonary effort is normal. No respiratory distress. Breath sounds: Normal breath sounds. Abdominal:      General: Abdomen is flat. Bowel sounds are normal.      Palpations: Abdomen is soft. Tenderness: There is abdominal tenderness (rlq). Musculoskeletal:      Cervical back: Neck supple. Skin:     General: Skin is warm and dry. Neurological:      General: No focal deficit present. Mental Status: She is alert. Psychiatric:         Mood and Affect: Mood normal.         Behavior: Behavior normal.         ASSESSMENT & PLAN    1.  Encounter to establish care  -f/u labs  -hold on vaccines today will try to obtain from prior pcp  - TSH WITH REFLEX TO FT4; Future    2. Gastroesophageal reflux disease, unspecified whether esophagitis present  -stable    3. Essential hypertension  -stable on current regimen  - CBC WITH AUTO DIFFERENTIAL; Future  - Comprehensive Metabolic Panel; Future  - Lipid, Fasting; Future  - TSH WITH REFLEX TO FT4; Future    4. Irritable bowel syndrome with constipation  -follows with GI, plan for ex lap continue linzess    5. Class 1 obesity due to excess calories without serious comorbidity with body mass index (BMI) of 34.0 to 34.9 in adult  -counseled on diet/lifestyle  - Lipid, Fasting; Future  - TSH WITH REFLEX TO FT4; Future    6. Encounter for screening for diabetes mellitus      Return in about 3 months (around 9/28/2021).     Electronically signed by Cammy Francisco DO on 7/2/2021

## 2021-07-02 ENCOUNTER — TELEPHONE (OUTPATIENT)
Dept: SURGERY | Age: 41
End: 2021-07-02

## 2021-07-02 ASSESSMENT — ENCOUNTER SYMPTOMS: ABDOMINAL PAIN: 1

## 2021-07-02 NOTE — TELEPHONE ENCOUNTER
SPOKE TO Mart[facility] [address] [facility] [place]) SCHEDULED @ [facility].  NOTIFIED OF DATES, TIMES AND LOCATION    PAT - PHONE ASSESSMENT  SURGERY - 07/16/21 @ 10:00, 8:00 arrival  P/O - Call after discharge to schedule appointment    NPO AFTER MIDNIGHT  No Covid Check Required, Is fully vaccinated with unknown  HOLD BLOOD THINNERS - Not on thinners   SENT

## 2021-07-07 ENCOUNTER — TELEPHONE (OUTPATIENT)
Dept: SURGERY | Age: 41
End: 2021-07-07

## 2021-07-07 NOTE — TELEPHONE ENCOUNTER
Member Info   Member ID:  [id number]  Member Name:  Christina Pettit  Member :  1980  Reference #: [id number]  Reference #: [id number]  Description: Outpatient Elective  Place Of Service: [address] [place] [address]  Submitting Provider: Dwayne Villareal Physician/Medical Doctor (MD) P  Requesting/Ordering Provider: Jack Nieto Physician/Medical Doctor (MD) P  Servicing/Rendering Provider: [address] [place] [address]:   Member Information  Member Name: Bashir Guallpa Id: [id number]  YOB: 1980  Gender: Female  Service Event  Diagnosis Code: K43.2 Incisional hernia without obstruction or gangrene  Procedure: 54525 Exploratory laparotomy, exploratory celiotomy with or without biopsy(s) (separate procedure)  Line #1  Requested Received Date: 2021 7:32:00 AM Requested Units: 2  Start Date of Service: 2021 Authorized Units: 2  End Date of Service: 2021 Status: Approved  Service Event  Diagnosis Code: K43.2 Incisional hernia without obstruction or gangrene  Procedure: 93728 Laparoscopy, surgical; repair initial inguinal hernia  Line #1  Requested Received Date: 2021 7:32:00 AM Requested Units: 2  Start Date of Service: 2021 Authorized Units: 2  End Date of Service: 2021 Status: Approved

## 2021-07-13 NOTE — PROGRESS NOTES
.Surgery 7/16/21, you will be called 7/15/21 with times               1. Do not eat or drink anything after midnight - unless instructed by your doctor prior to surgery. This includes                   no water, chewing gum or mints. 2. Follow your directions as prescribed by the doctor for your procedure and medications. Take Omeprazole in am with a sip. 3. Check with your Doctor regarding stopping Plavix, Coumadin, Lovenox,Effient,Pradaxa,Xarelto, Fragmin or other blood thinners and                   follow their instructions. 4. Do not smoke, and do not drink any alcoholic beverages 24 hours prior to surgery. This includes NA Beer. 5. You may brush your teeth and gargle the morning of surgery. DO NOT SWALLOW WATER   6. You MUST make arrangements for a responsible adult to take you home after your surgery and be able to check on you every couple                   hours for the day. You will not be allowed to leave alone or drive yourself home. It is strongly suggested someone stay with you the first 24                   hrs. Your surgery will be cancelled if you do not have a ride home. 7. Please wear simple, loose fitting clothing to the hospital.  Armani Paulson not bring valuables (money, credit cards, checkbooks, etc.) Do not wear any                   makeup (including no eye makeup) or nail polish on your fingers or toes. 8. DO NOT wear any jewelry or piercings on day of surgery. All body piercing jewelry must be removed. 9. If you have dentures, they will be removed before going to the OR; we will provide you a container. If you wear contact lenses or glasses,                  they will be removed; please bring a case for them. 10. If you  have a Living Will and Durable Power of  for Healthcare, please bring in a copy. 11. Please bring picture ID,  insurance card, paperwork from the doctors office    (H & P, Consent, & card for implantable devices). 12. Take a shower the night before or morning of your procedure, do not apply any lotion, oil or powder. 13. Wear a mask covering your nose & mouth when entering the hospital. Patient is vaccinated. Quarantine yourself after the test until after your surgery.

## 2021-07-14 ENCOUNTER — ANESTHESIA EVENT (OUTPATIENT)
Dept: OPERATING ROOM | Age: 41
End: 2021-07-14
Payer: COMMERCIAL

## 2021-07-14 NOTE — ANESTHESIA PRE PROCEDURE
Department of Anesthesiology  Preprocedure Note       Name:  Elke Macdonald   Age:  39 y.o.  :  1980                                          MRN:  [id number]         Date:  2021      Surgeon: Zoe Beach):  Anthony Maynard MD    Procedure: Procedure(s):  ROBOTIC EXPLORATION AND HERNIA RIGHT INGUINAL REPAIR LAPAROSCOPIC    Medications prior to admission:   Prior to Admission medications    Medication Sig Start Date End Date Taking? Authorizing Provider   LINZESS 145 MCG capsule  21   Historical Provider, MD   lisinopril (PRINIVIL;ZESTRIL) 20 MG tablet  6/15/21   Historical Provider, MD   Indomethacin (INDOCIN PO) Take by mouth PRN    Historical Provider, MD   Acetaminophen (TYLENOL 8 HOUR PO) Take by mouth    Historical Provider, MD   omeprazole (PRILOSEC) 40 MG delayed release capsule Take 40 mg by mouth daily    Historical Provider, MD       Current medications:    No current facility-administered medications for this encounter.      Current Outpatient Medications   Medication Sig Dispense Refill    LINZESS 145 MCG capsule       lisinopril (PRINIVIL;ZESTRIL) 20 MG tablet       Indomethacin (INDOCIN PO) Take by mouth PRN      Acetaminophen (TYLENOL 8 HOUR PO) Take by mouth      omeprazole (PRILOSEC) 40 MG delayed release capsule Take 40 mg by mouth daily         Allergies:  No Known Allergies    Problem List:    Patient Active Problem List   Diagnosis Code    Morbid obesity ([facility] E66.01    Excessive daytime sleepiness G47.19    Acute cholecystitis K81.0    Acute post-operative pain G89.18    Irritable bowel syndrome with constipation K58.1    Essential hypertension I10    Gastroesophageal reflux disease K21.9       Past Medical History:        Diagnosis Date    Excessive daytime sleepiness 2017    GERD (gastroesophageal reflux disease)     Hypertension     Morbid obesity ([facility] [address] 2017       Past Surgical History:        Procedure Laterality Date     SECTION 1998 & 2014    CHOLECYSTECTOMY  08/15/2018    laprascopic    HYSTERECTOMY  2020    UPPER GASTROINTESTINAL ENDOSCOPY  09/11/2017       Social History:    Social History     Tobacco Use    Smoking status: Never Smoker    Smokeless tobacco: Never Used   Substance Use Topics    Alcohol use: Yes     Comment: social                                Counseling given: Not Answered      Vital Signs (Current): There were no vitals filed for this visit. BP Readings from Last 3 Encounters:   06/28/21 126/71   06/24/21 (!) 144/96   12/09/20 (!) 155/94       NPO Status:                                                                                 BMI:   Wt Readings from Last 3 Encounters:   06/28/21 228 lb (103.4 kg)   06/24/21 231 lb 8 oz (105 kg)   12/08/20 240 lb (108.9 kg)     There is no height or weight on file to calculate BMI.    CBC:   Lab Results   Component Value Date    WBC 8.4 06/28/2021    RBC 4.76 06/28/2021    HGB 13.6 06/28/2021    HCT 39.0 06/28/2021    MCV 81.9 06/28/2021    RDW 14.0 06/28/2021     06/28/2021       CMP:   Lab Results   Component Value Date     06/28/2021    K 4.1 06/28/2021     06/28/2021    CO2 22 06/28/2021    BUN 10 06/28/2021    CREATININE 0.8 06/28/2021    GFRAA >60 06/28/2021    AGRATIO 1.3 06/28/2021    LABGLOM >60 06/28/2021    GLUCOSE 73 06/28/2021    PROT 7.9 06/28/2021    PROT 8.8 02/14/2012    CALCIUM 9.4 06/28/2021    BILITOT 0.5 06/28/2021    ALKPHOS 69 06/28/2021    AST 23 06/28/2021    ALT 7 06/28/2021       POC Tests: No results for input(s): POCGLU, POCNA, POCK, POCCL, POCBUN, POCHEMO, POCHCT in the last 72 hours.     Coags: No results found for: PROTIME, INR, APTT    HCG (If Applicable):   Lab Results   Component Value Date    PREGTESTUR NEGATIVE 08/15/2018        ABGs: No results found for: PHART, PO2ART, AEG1ETL, LKT8GLD, BEART, C4OEVGXW     Type & Screen (If Applicable):  No results found for: LABABO, [address]    Drug/Infectious Status (If Applicable):  No results found for: HIV, HEPCAB    COVID-19 Screening (If Applicable): No results found for: COVID19        Anesthesia Evaluation  Patient summary reviewed  Airway:         Dental:          Pulmonary:Negative Pulmonary ROS                              Cardiovascular:    (+) hypertension:,          Beta Blocker:  Not on Beta Blocker         Neuro/Psych:   Negative Neuro/Psych ROS              GI/Hepatic/Renal:   (+) GERD:, morbid obesity          Endo/Other: Negative Endo/Other ROS                    Abdominal:             Vascular: negative vascular ROS. Other Findings:             Anesthesia Plan      general     ASA 2       Induction: intravenous. Alyse Argueta APRN - CRNA   7/14/2021         Pre Anesthesia Assessment complete.  Chart reviewed on 7/14/2021

## 2021-07-15 NOTE — H&P
General Surgery - H&P  JORDY GleasonC      SUBJECTIVE:  HPI:  Patient complains of abdominal pain. Pain is located in the RLQ with no radiation. The pain is described as colicky, cramping and pressure-like. Onset was several years ago. Symptoms have been gradually worsening since. Aggravating factors: eating and fatty foods. Associated symptoms: constipation. The patient denies chills and fever.     I have reviewed the patient's(pertinent information to this visit) medical history, family history(scanned in  the Media tab under \"patient questioner\"), social history and review of systems with the patient in the office.        Past Surgical History         Past Surgical History:   Procedure Laterality Date     SECTION        CHOLECYSTECTOMY   08/15/2018     laprascopic    UPPER GASTROINTESTINAL ENDOSCOPY   2017         Past Medical History        Past Medical History:   Diagnosis Date    Excessive daytime sleepiness 2017    GERD (gastroesophageal reflux disease)      Hypertension      Morbid obesity ([address] 2017    Preop examination 2017         Family History         Family History   Problem Relation Age of Onset    Hypertension Mother      Hypertension Father      Hearing Loss Brother      High Blood Pressure Maternal Grandmother      Arthritis Maternal Grandfather           Social History               Socioeconomic History    Marital status: Single       Spouse name: Not on file    Number of children: Not on file    Years of education: Not on file    Highest education level: Not on file   Occupational History    Not on file   Tobacco Use    Smoking status: Never Smoker    Smokeless tobacco: Never Used   Substance and Sexual Activity    Alcohol use:  Yes       Comment: social    Drug use: No    Sexual activity: Not on file   Other Topics Concern    Not on file   Social History Narrative    Not on file      Social Determinants of Health     Financial Resource Strain: Low Risk     Difficulty of Paying Living Expenses: Not hard at all   Food Insecurity: No Food Insecurity    Worried About Running Out of Food in the Last Year: Never true    Janneth of Food in the Last Year: Never true   Transportation Needs:     Lack of Transportation (Medical):  Lack of Transportation (Non-Medical):    Physical Activity:     Days of Exercise per Week:     Minutes of Exercise per Session:    Stress:     Feeling of Stress :    Social Connections:     Frequency of Communication with Friends and Family:     Frequency of Social Gatherings with Friends and Family:     Attends [employer] Services:     Active Member of Clubs or Organizations:     Attends Club or Organization Meetings:     Marital Status:    Intimate Partner Violence:     Fear of Current or Ex-Partner:     Emotionally Abused:     Physically Abused:     Sexually Abused:             Current Facility-Administered Medications          Current Outpatient Medications   Medication Sig Dispense Refill    LINZESS 145 MCG capsule          lisinopril (PRINIVIL;ZESTRIL) 20 MG tablet          Indomethacin (INDOCIN PO) Take by mouth PRN        Acetaminophen (TYLENOL 8 HOUR PO) Take by mouth        omeprazole (PRILOSEC) 40 MG delayed release capsule Take 40 mg by mouth daily          No current facility-administered medications for this visit.         No Known Allergies     Review of Systems:       Review of Systems   Constitutional: Negative for chills and fever. HENT: Negative for ear pain, mouth sores, sore throat and tinnitus. Eyes: Negative for photophobia, redness and itching. Respiratory: Negative for apnea, choking and stridor. Cardiovascular: Negative for chest pain and palpitations. Gastrointestinal: Positive for abdominal pain and constipation. Negative for anal bleeding and rectal pain. Endocrine: Negative for polydipsia.    Genitourinary: Negative for enuresis, flank pain and hematuria. Musculoskeletal: Negative for back pain, joint swelling and myalgias. Skin: Negative for color change and pallor. Allergic/Immunologic: Negative for environmental allergies. Neurological: Negative for syncope and speech difficulty. Psychiatric/Behavioral: Negative for confusion and hallucinations.         OBJECTIVE:  Physical Exam:    BP (!) 144/96   Pulse 73   Temp 97.2 °F (36.2 °C)   Ht 5' 8\" (1.727 m)   Wt 231 lb 8 oz (105 kg)   LMP 06/15/2019   SpO2 98%   BMI 35.20 kg/m²       Physical Exam  Constitutional:       Appearance: She is well-developed. HENT:      Head: Normocephalic. Eyes:      Pupils: Pupils are equal, round, and reactive to light. Cardiovascular:      Rate and Rhythm: Normal rate. Pulmonary:      Effort: Pulmonary effort is normal.   Abdominal:      General: There is no distension. Palpations: Abdomen is soft. There is no mass. Tenderness: There is abdominal tenderness. There is no guarding or rebound. Musculoskeletal:         General: Normal range of motion. Cervical back: Normal range of motion and neck supple. Skin:     General: Skin is warm. Neurological:      Mental Status: She is alert and oriented to person, place, and time.          ASSESSMENT:  1. Incisional hernia, without obstruction or gangrene          PLAN:  Treatment: Will proceed exploratory robotic/laparoscopy for evaluation, with possible incisional hernia repair. These findings were discussed with patient and she agrees to proceed. Patient counseled on risks, benefits, and alternatives of treatment plan at length today. Patient states an understanding and willingness to proceed with plan        The patient was counseled at length about the risks of terry Covid-19 during their perioperative period and any recovery window from their procedure.   The patient was made aware that terry Covid-19  may worsen their prognosis for recovering from their procedure  and lend to a higher morbidity and/or mortality risk. All material risks, benefits, and reasonable alternatives including postponing the procedure were discussed. The patient does wish to proceed with the procedure at this time.       Sivakumar Sun PA-C

## 2021-07-16 ENCOUNTER — ANESTHESIA (OUTPATIENT)
Dept: OPERATING ROOM | Age: 41
End: 2021-07-16
Payer: COMMERCIAL

## 2021-07-16 ENCOUNTER — HOSPITAL ENCOUNTER (OUTPATIENT)
Age: 41
Setting detail: OUTPATIENT SURGERY
Discharge: HOME | End: 2021-07-16
Attending: SURGERY | Admitting: SURGERY
Payer: COMMERCIAL

## 2021-07-16 VITALS
RESPIRATION RATE: 1 BRPM | DIASTOLIC BLOOD PRESSURE: 97 MMHG | OXYGEN SATURATION: 97 % | SYSTOLIC BLOOD PRESSURE: 197 MMHG | TEMPERATURE: 94.9 F

## 2021-07-16 VITALS
SYSTOLIC BLOOD PRESSURE: 130 MMHG | HEART RATE: 85 BPM | DIASTOLIC BLOOD PRESSURE: 80 MMHG | RESPIRATION RATE: 19 BRPM | TEMPERATURE: 97.5 F | WEIGHT: 230 LBS | BODY MASS INDEX: 34.86 KG/M2 | OXYGEN SATURATION: 98 % | HEIGHT: 68 IN

## 2021-07-16 DIAGNOSIS — K43.9 VENTRAL HERNIA WITHOUT OBSTRUCTION OR GANGRENE: Primary | ICD-10-CM

## 2021-07-16 DIAGNOSIS — K40.90 RIGHT INGUINAL HERNIA: ICD-10-CM

## 2021-07-16 PROCEDURE — 3700000000 HC ANESTHESIA ATTENDED CARE: Performed by: SURGERY

## 2021-07-16 PROCEDURE — 7100000011 HC PHASE II RECOVERY - ADDTL 15 MIN: Performed by: SURGERY

## 2021-07-16 PROCEDURE — S2900 ROBOTIC SURGICAL SYSTEM: HCPCS | Performed by: PHYSICIAN ASSISTANT

## 2021-07-16 PROCEDURE — 2500000003 HC RX 250 WO HCPCS: Performed by: ANESTHESIOLOGY

## 2021-07-16 PROCEDURE — 88302 TISSUE EXAM BY PATHOLOGIST: CPT | Performed by: PATHOLOGY

## 2021-07-16 PROCEDURE — 49652 PR LAP, VENTRAL HERNIA REPAIR,REDUCIBLE: CPT | Performed by: SURGERY

## 2021-07-16 PROCEDURE — S2900 ROBOTIC SURGICAL SYSTEM: HCPCS | Performed by: SURGERY

## 2021-07-16 PROCEDURE — 49650 LAP ING HERNIA REPAIR INIT: CPT | Performed by: PHYSICIAN ASSISTANT

## 2021-07-16 PROCEDURE — 3700000001 HC ADD 15 MINUTES (ANESTHESIA): Performed by: SURGERY

## 2021-07-16 PROCEDURE — C1781 MESH (IMPLANTABLE): HCPCS | Performed by: SURGERY

## 2021-07-16 PROCEDURE — 7100000001 HC PACU RECOVERY - ADDTL 15 MIN: Performed by: SURGERY

## 2021-07-16 PROCEDURE — 2500000003 HC RX 250 WO HCPCS: Performed by: NURSE ANESTHETIST, CERTIFIED REGISTERED

## 2021-07-16 PROCEDURE — 3600000019 HC SURGERY ROBOT ADDTL 15MIN: Performed by: SURGERY

## 2021-07-16 PROCEDURE — 7100000000 HC PACU RECOVERY - FIRST 15 MIN: Performed by: SURGERY

## 2021-07-16 PROCEDURE — 2709999900 HC NON-CHARGEABLE SUPPLY: Performed by: SURGERY

## 2021-07-16 PROCEDURE — 6360000002 HC RX W HCPCS: Performed by: PHYSICIAN ASSISTANT

## 2021-07-16 PROCEDURE — APPNB180 APP NON BILLABLE TIME > 60 MINS: Performed by: PHYSICIAN ASSISTANT

## 2021-07-16 PROCEDURE — 49652 PR LAP, VENTRAL HERNIA REPAIR,REDUCIBLE: CPT | Performed by: PHYSICIAN ASSISTANT

## 2021-07-16 PROCEDURE — 2580000003 HC RX 258: Performed by: ANESTHESIOLOGY

## 2021-07-16 PROCEDURE — 7100000010 HC PHASE II RECOVERY - FIRST 15 MIN: Performed by: SURGERY

## 2021-07-16 PROCEDURE — 2500000003 HC RX 250 WO HCPCS: Performed by: SURGERY

## 2021-07-16 PROCEDURE — 6360000002 HC RX W HCPCS: Performed by: NURSE ANESTHETIST, CERTIFIED REGISTERED

## 2021-07-16 PROCEDURE — 49650 LAP ING HERNIA REPAIR INIT: CPT | Performed by: SURGERY

## 2021-07-16 PROCEDURE — 3600000009 HC SURGERY ROBOT BASE: Performed by: SURGERY

## 2021-07-16 PROCEDURE — 6360000002 HC RX W HCPCS: Performed by: ANESTHESIOLOGY

## 2021-07-16 DEVICE — MESH HERN L W4.1XL6.2IN R POLYPR L PORE KNIT LT 3DMAX: Type: IMPLANTABLE DEVICE | Status: FUNCTIONAL

## 2021-07-16 RX ORDER — HYDROCODONE BITARTRATE AND ACETAMINOPHEN 5; 325 MG/1; MG/1
1 TABLET ORAL EVERY 6 HOURS PRN
Qty: 12 TABLET | Refills: 0 | Status: SHIPPED | OUTPATIENT
Start: 2021-07-16 | End: 2021-07-19

## 2021-07-16 RX ORDER — FENTANYL CITRATE 50 UG/ML
INJECTION, SOLUTION INTRAMUSCULAR; INTRAVENOUS PRN
Status: DISCONTINUED | OUTPATIENT
Start: 2021-07-16 | End: 2021-07-16 | Stop reason: SDUPTHER

## 2021-07-16 RX ORDER — FENTANYL CITRATE 50 UG/ML
50 INJECTION, SOLUTION INTRAMUSCULAR; INTRAVENOUS EVERY 5 MIN PRN
Status: DISCONTINUED | OUTPATIENT
Start: 2021-07-16 | End: 2021-07-16 | Stop reason: HOSPADM

## 2021-07-16 RX ORDER — MIDAZOLAM HYDROCHLORIDE 1 MG/ML
INJECTION INTRAMUSCULAR; INTRAVENOUS PRN
Status: DISCONTINUED | OUTPATIENT
Start: 2021-07-16 | End: 2021-07-16 | Stop reason: SDUPTHER

## 2021-07-16 RX ORDER — DIPHENHYDRAMINE HYDROCHLORIDE 50 MG/ML
12.5 INJECTION INTRAMUSCULAR; INTRAVENOUS
Status: DISCONTINUED | OUTPATIENT
Start: 2021-07-16 | End: 2021-07-16 | Stop reason: HOSPADM

## 2021-07-16 RX ORDER — LABETALOL HYDROCHLORIDE 5 MG/ML
5 INJECTION, SOLUTION INTRAVENOUS EVERY 10 MIN PRN
Status: DISCONTINUED | OUTPATIENT
Start: 2021-07-16 | End: 2021-07-16 | Stop reason: HOSPADM

## 2021-07-16 RX ORDER — LIDOCAINE HYDROCHLORIDE 20 MG/ML
INJECTION, SOLUTION EPIDURAL; INFILTRATION; INTRACAUDAL; PERINEURAL PRN
Status: DISCONTINUED | OUTPATIENT
Start: 2021-07-16 | End: 2021-07-16 | Stop reason: SDUPTHER

## 2021-07-16 RX ORDER — SODIUM CHLORIDE, SODIUM LACTATE, POTASSIUM CHLORIDE, CALCIUM CHLORIDE 600; 310; 30; 20 MG/100ML; MG/100ML; MG/100ML; MG/100ML
INJECTION, SOLUTION INTRAVENOUS CONTINUOUS
Status: DISCONTINUED | OUTPATIENT
Start: 2021-07-16 | End: 2021-07-16 | Stop reason: HOSPADM

## 2021-07-16 RX ORDER — HYDROMORPHONE HCL 110MG/55ML
0.5 PATIENT CONTROLLED ANALGESIA SYRINGE INTRAVENOUS EVERY 5 MIN PRN
Status: DISCONTINUED | OUTPATIENT
Start: 2021-07-16 | End: 2021-07-16 | Stop reason: HOSPADM

## 2021-07-16 RX ORDER — MEPERIDINE HYDROCHLORIDE 25 MG/ML
12.5 INJECTION INTRAMUSCULAR; INTRAVENOUS; SUBCUTANEOUS EVERY 5 MIN PRN
Status: DISCONTINUED | OUTPATIENT
Start: 2021-07-16 | End: 2021-07-16 | Stop reason: HOSPADM

## 2021-07-16 RX ORDER — BUPIVACAINE HYDROCHLORIDE 5 MG/ML
INJECTION, SOLUTION EPIDURAL; INTRACAUDAL
Status: COMPLETED | OUTPATIENT
Start: 2021-07-16 | End: 2021-07-16

## 2021-07-16 RX ORDER — HYDROCODONE BITARTRATE AND ACETAMINOPHEN 5; 325 MG/1; MG/1
2 TABLET ORAL PRN
Status: DISCONTINUED | OUTPATIENT
Start: 2021-07-16 | End: 2021-07-16 | Stop reason: HOSPADM

## 2021-07-16 RX ORDER — HYDROCODONE BITARTRATE AND ACETAMINOPHEN 5; 325 MG/1; MG/1
1 TABLET ORAL PRN
Status: DISCONTINUED | OUTPATIENT
Start: 2021-07-16 | End: 2021-07-16 | Stop reason: HOSPADM

## 2021-07-16 RX ORDER — ROCURONIUM BROMIDE 10 MG/ML
INJECTION, SOLUTION INTRAVENOUS PRN
Status: DISCONTINUED | OUTPATIENT
Start: 2021-07-16 | End: 2021-07-16 | Stop reason: SDUPTHER

## 2021-07-16 RX ORDER — PROMETHAZINE HYDROCHLORIDE 25 MG/ML
6.25 INJECTION, SOLUTION INTRAMUSCULAR; INTRAVENOUS
Status: DISCONTINUED | OUTPATIENT
Start: 2021-07-16 | End: 2021-07-16 | Stop reason: HOSPADM

## 2021-07-16 RX ORDER — LABETALOL HYDROCHLORIDE 5 MG/ML
INJECTION, SOLUTION INTRAVENOUS PRN
Status: DISCONTINUED | OUTPATIENT
Start: 2021-07-16 | End: 2021-07-16 | Stop reason: SDUPTHER

## 2021-07-16 RX ORDER — DEXAMETHASONE SODIUM PHOSPHATE 4 MG/ML
INJECTION, SOLUTION INTRA-ARTICULAR; INTRALESIONAL; INTRAMUSCULAR; INTRAVENOUS; SOFT TISSUE PRN
Status: DISCONTINUED | OUTPATIENT
Start: 2021-07-16 | End: 2021-07-16 | Stop reason: SDUPTHER

## 2021-07-16 RX ORDER — ONDANSETRON 2 MG/ML
INJECTION INTRAMUSCULAR; INTRAVENOUS PRN
Status: DISCONTINUED | OUTPATIENT
Start: 2021-07-16 | End: 2021-07-16 | Stop reason: SDUPTHER

## 2021-07-16 RX ORDER — HYDRALAZINE HYDROCHLORIDE 20 MG/ML
5 INJECTION INTRAMUSCULAR; INTRAVENOUS EVERY 10 MIN PRN
Status: DISCONTINUED | OUTPATIENT
Start: 2021-07-16 | End: 2021-07-16 | Stop reason: HOSPADM

## 2021-07-16 RX ORDER — METOCLOPRAMIDE HYDROCHLORIDE 5 MG/ML
10 INJECTION INTRAMUSCULAR; INTRAVENOUS
Status: DISCONTINUED | OUTPATIENT
Start: 2021-07-16 | End: 2021-07-16 | Stop reason: HOSPADM

## 2021-07-16 RX ORDER — PROPOFOL 10 MG/ML
INJECTION, EMULSION INTRAVENOUS PRN
Status: DISCONTINUED | OUTPATIENT
Start: 2021-07-16 | End: 2021-07-16 | Stop reason: SDUPTHER

## 2021-07-16 RX ADMIN — ONDANSETRON 4 MG: 2 INJECTION INTRAMUSCULAR; INTRAVENOUS at 09:35

## 2021-07-16 RX ADMIN — PROPOFOL 200 MG: 10 INJECTION, EMULSION INTRAVENOUS at 09:30

## 2021-07-16 RX ADMIN — FENTANYL CITRATE 50 MCG: 50 INJECTION, SOLUTION INTRAMUSCULAR; INTRAVENOUS at 09:48

## 2021-07-16 RX ADMIN — LABETALOL HYDROCHLORIDE 5 MG: 5 INJECTION, SOLUTION INTRAVENOUS at 11:08

## 2021-07-16 RX ADMIN — SODIUM CHLORIDE, POTASSIUM CHLORIDE, SODIUM LACTATE AND CALCIUM CHLORIDE: 600; 310; 30; 20 INJECTION, SOLUTION INTRAVENOUS at 08:24

## 2021-07-16 RX ADMIN — FENTANYL CITRATE 50 MCG: 50 INJECTION, SOLUTION INTRAMUSCULAR; INTRAVENOUS at 10:36

## 2021-07-16 RX ADMIN — SODIUM CHLORIDE, POTASSIUM CHLORIDE, SODIUM LACTATE AND CALCIUM CHLORIDE: 600; 310; 30; 20 INJECTION, SOLUTION INTRAVENOUS at 10:43

## 2021-07-16 RX ADMIN — MIDAZOLAM 2 MG: 1 INJECTION INTRAMUSCULAR; INTRAVENOUS at 09:15

## 2021-07-16 RX ADMIN — FENTANYL CITRATE 50 MCG: 50 INJECTION, SOLUTION INTRAMUSCULAR; INTRAVENOUS at 09:28

## 2021-07-16 RX ADMIN — LABETALOL HYDROCHLORIDE 5 MG: 5 INJECTION, SOLUTION INTRAVENOUS at 09:59

## 2021-07-16 RX ADMIN — DEXAMETHASONE SODIUM PHOSPHATE 8 MG: 4 INJECTION, SOLUTION INTRAMUSCULAR; INTRAVENOUS at 09:35

## 2021-07-16 RX ADMIN — HYDROMORPHONE HYDROCHLORIDE 0.5 MG: 2 INJECTION INTRAMUSCULAR; INTRAVENOUS; SUBCUTANEOUS at 11:11

## 2021-07-16 RX ADMIN — LABETALOL HYDROCHLORIDE 5 MG: 5 INJECTION, SOLUTION INTRAVENOUS at 09:42

## 2021-07-16 RX ADMIN — LABETALOL HYDROCHLORIDE 5 MG: 5 INJECTION, SOLUTION INTRAVENOUS at 10:58

## 2021-07-16 RX ADMIN — CEFAZOLIN 2000 MG: 10 INJECTION, POWDER, FOR SOLUTION INTRAVENOUS at 09:19

## 2021-07-16 RX ADMIN — FENTANYL CITRATE 50 MCG: 50 INJECTION, SOLUTION INTRAMUSCULAR; INTRAVENOUS at 09:24

## 2021-07-16 RX ADMIN — ROCURONIUM BROMIDE 50 MG: 10 INJECTION INTRAVENOUS at 09:30

## 2021-07-16 RX ADMIN — LIDOCAINE HYDROCHLORIDE 100 MG: 20 INJECTION, SOLUTION EPIDURAL; INFILTRATION; INTRACAUDAL; PERINEURAL at 09:30

## 2021-07-16 RX ADMIN — HYDRALAZINE HYDROCHLORIDE 5 MG: 20 INJECTION INTRAMUSCULAR; INTRAVENOUS at 11:15

## 2021-07-16 RX ADMIN — HYDRALAZINE HYDROCHLORIDE 5 MG: 20 INJECTION INTRAMUSCULAR; INTRAVENOUS at 11:25

## 2021-07-16 RX ADMIN — SUGAMMADEX 200 MG: 100 INJECTION, SOLUTION INTRAVENOUS at 10:36

## 2021-07-16 ASSESSMENT — PULMONARY FUNCTION TESTS
PIF_VALUE: 35
PIF_VALUE: 35
PIF_VALUE: 29
PIF_VALUE: 35
PIF_VALUE: 20
PIF_VALUE: 30
PIF_VALUE: 34
PIF_VALUE: 32
PIF_VALUE: 34
PIF_VALUE: 8
PIF_VALUE: 34
PIF_VALUE: 2
PIF_VALUE: 2
PIF_VALUE: 29
PIF_VALUE: 2
PIF_VALUE: 26
PIF_VALUE: 36
PIF_VALUE: 34
PIF_VALUE: 34
PIF_VALUE: 2
PIF_VALUE: 29
PIF_VALUE: 34
PIF_VALUE: 35
PIF_VALUE: 30
PIF_VALUE: 33
PIF_VALUE: 34
PIF_VALUE: 30
PIF_VALUE: 34
PIF_VALUE: 35
PIF_VALUE: 34
PIF_VALUE: 29
PIF_VALUE: 34
PIF_VALUE: 34
PIF_VALUE: 33
PIF_VALUE: 6
PIF_VALUE: 29
PIF_VALUE: 24
PIF_VALUE: 22
PIF_VALUE: 29
PIF_VALUE: 31
PIF_VALUE: 1
PIF_VALUE: 29
PIF_VALUE: 32
PIF_VALUE: 29
PIF_VALUE: 29
PIF_VALUE: 35
PIF_VALUE: 25
PIF_VALUE: 29
PIF_VALUE: 19
PIF_VALUE: 34
PIF_VALUE: 29
PIF_VALUE: 34
PIF_VALUE: 24
PIF_VALUE: 2
PIF_VALUE: 25
PIF_VALUE: 34
PIF_VALUE: 34
PIF_VALUE: 24
PIF_VALUE: 29
PIF_VALUE: 29
PIF_VALUE: 34
PIF_VALUE: 2
PIF_VALUE: 2
PIF_VALUE: 34
PIF_VALUE: 19
PIF_VALUE: 35
PIF_VALUE: 2
PIF_VALUE: 25
PIF_VALUE: 29
PIF_VALUE: 31
PIF_VALUE: 35
PIF_VALUE: 30
PIF_VALUE: 1
PIF_VALUE: 1
PIF_VALUE: 30
PIF_VALUE: 2
PIF_VALUE: 34
PIF_VALUE: 2
PIF_VALUE: 2
PIF_VALUE: 34
PIF_VALUE: 34
PIF_VALUE: 33
PIF_VALUE: 30
PIF_VALUE: 29
PIF_VALUE: 30
PIF_VALUE: 2
PIF_VALUE: 2
PIF_VALUE: 24
PIF_VALUE: 29

## 2021-07-16 ASSESSMENT — PAIN DESCRIPTION - FREQUENCY: FREQUENCY: CONTINUOUS

## 2021-07-16 ASSESSMENT — PAIN DESCRIPTION - DESCRIPTORS
DESCRIPTORS: ACHING
DESCRIPTORS: SORE

## 2021-07-16 ASSESSMENT — PAIN DESCRIPTION - PAIN TYPE
TYPE: SURGICAL PAIN
TYPE: SURGICAL PAIN

## 2021-07-16 ASSESSMENT — PAIN SCALES - GENERAL
PAINLEVEL_OUTOF10: 0
PAINLEVEL_OUTOF10: 6
PAINLEVEL_OUTOF10: 5
PAINLEVEL_OUTOF10: 4
PAINLEVEL_OUTOF10: 4
PAINLEVEL_OUTOF10: 6

## 2021-07-16 ASSESSMENT — PAIN DESCRIPTION - ONSET: ONSET: ON-GOING

## 2021-07-16 ASSESSMENT — PAIN - FUNCTIONAL ASSESSMENT: PAIN_FUNCTIONAL_ASSESSMENT: 0-10

## 2021-07-16 ASSESSMENT — PAIN DESCRIPTION - LOCATION
LOCATION: ABDOMEN

## 2021-07-16 ASSESSMENT — PAIN DESCRIPTION - PROGRESSION
CLINICAL_PROGRESSION: GRADUALLY IMPROVING
CLINICAL_PROGRESSION: GRADUALLY IMPROVING

## 2021-07-16 NOTE — PROGRESS NOTES
1051- pt arrived from OR, monitors applied. Report received from [facility] and [facility]. Respirations even and unlabored. Abdominal incisions well approximated with surgical glue dry and intact. Abdomen soft. Abdominal binder in place. VSS. 1110- pt more awake and re-orients easily. Complaining of surgiacl pain 6/10, pain medication given. Pt denies nausea, not interested in ice chips at this time. Weaning O2 as patient tolerates. VSS  1140- pt taking ice chips, tolerating room air well and states pain is improving. Pt to [facility], report given to [facility] RN at bedside.

## 2021-07-16 NOTE — ANESTHESIA PRE PROCEDURE
Department of Anesthesiology  Preprocedure Note       Name:  Sanjay Amor   Age:  39 y.o.  :  1980                                          MRN:  [id number]         Date:  2021      Surgeon: Kika Carter):  Franc Lovett MD    Procedure: Procedure(s):  ROBOTIC EXPLORATION AND HERNIA RIGHT INGUINAL REPAIR LAPAROSCOPIC    Medications prior to admission:   Prior to Admission medications    Medication Sig Start Date End Date Taking?  Authorizing Provider   LINCHAOS 145 MCG capsule  21   Historical Provider, MD   lisinopril (PRINIVIL;ZESTRIL) 20 MG tablet  6/15/21   Historical Provider, MD   Indomethacin (INDOCIN PO) Take by mouth PRN    Historical Provider, MD   Acetaminophen (TYLENOL 8 HOUR PO) Take by mouth    Historical Provider, MD   omeprazole (PRILOSEC) 40 MG delayed release capsule Take 40 mg by mouth daily    Historical Provider, MD       Current medications:    Current Facility-Administered Medications   Medication Dose Route Frequency Provider Last Rate Last Admin    ceFAZolin (ANCEF) 2000 mg in dextrose 5 % 100 mL IVPB  2,000 mg Intravenous Once Lei Fan PA-C           Allergies:  No Known Allergies    Problem List:    Patient Active Problem List   Diagnosis Code    Morbid obesity ([facility] [address] E66.01    Excessive daytime sleepiness G47.19    Acute cholecystitis K81.0    Acute post-operative pain G89.18    Irritable bowel syndrome with constipation K58.1    Essential hypertension I10    Gastroesophageal reflux disease K21.9       Past Medical History:        Diagnosis Date    Excessive daytime sleepiness 2017    GERD (gastroesophageal reflux disease)     Hypertension     Morbid obesity ([facility] [address] 2017       Past Surgical History:        Procedure Laterality Date   [address] & 2014    CHOLECYSTECTOMY  08/15/2018    laprascopic    HYSTERECTOMY  2020    UPPER GASTROINTESTINAL ENDOSCOPY  2017       Social History:    Social History     Tobacco Use    Smoking status: Never Smoker    Smokeless tobacco: Never Used   Substance Use Topics    Alcohol use: Yes     Comment: social                                Counseling given: Not Answered      Vital Signs (Current):   Vitals:    07/16/21 0815   BP: (!) 153/95   Pulse: 73   Resp: 16   Temp: 36.1 °C (97 °F)   TempSrc: Temporal   SpO2: 100%   Weight: 230 lb (104.3 kg)   Height: 5' 8\" (1.727 m)                                              BP Readings from Last 3 Encounters:   07/16/21 (!) 153/95   06/28/21 126/71   06/24/21 (!) 144/96       NPO Status:                                                                                 BMI:   Wt Readings from Last 3 Encounters:   07/16/21 230 lb (104.3 kg)   06/28/21 228 lb (103.4 kg)   06/24/21 231 lb 8 oz (105 kg)     Body mass index is 34.97 kg/m². CBC:   Lab Results   Component Value Date    WBC 8.4 06/28/2021    RBC 4.76 06/28/2021    HGB 13.6 06/28/2021    HCT 39.0 06/28/2021    MCV 81.9 06/28/2021    RDW 14.0 06/28/2021     06/28/2021       CMP:   Lab Results   Component Value Date     06/28/2021    K 4.1 06/28/2021     06/28/2021    CO2 22 06/28/2021    BUN 10 06/28/2021    CREATININE 0.8 06/28/2021    GFRAA >60 06/28/2021    AGRATIO 1.3 06/28/2021    LABGLOM >60 06/28/2021    GLUCOSE 73 06/28/2021    PROT 7.9 06/28/2021    PROT 8.8 02/14/2012    CALCIUM 9.4 06/28/2021    BILITOT 0.5 06/28/2021    ALKPHOS 69 06/28/2021    AST 23 06/28/2021    ALT 7 06/28/2021       POC Tests: No results for input(s): POCGLU, POCNA, POCK, POCCL, POCBUN, POCHEMO, POCHCT in the last 72 hours.     Coags: No results found for: PROTIME, INR, APTT    HCG (If Applicable):   Lab Results   Component Value Date    PREGTESTUR NEGATIVE 08/15/2018        ABGs: No results found for: PHART, PO2ART, MNK0ZMV, UNF3RYJ, BEART, S9TUBBNG     Type & Screen (If Applicable):  No results found for: LABABO, LABRH    Drug/Infectious Status (If Applicable):  No results found for: HIV, HEPCAB    COVID-19 Screening (If Applicable): No results found for: COVID19        Anesthesia Evaluation  Patient summary reviewed  Airway: Mallampati: II  TM distance: >3 FB   Neck ROM: full  Mouth opening: > = 3 FB Dental:          Pulmonary:Negative Pulmonary ROS                              Cardiovascular:  Exercise tolerance: no interval change,   (+) hypertension:,          Beta Blocker:  Not on Beta Blocker      ROS comment: EKG from 2019: Normal sinus rhythm   Minimal voltage criteria for LVH, may be normal variant   Borderline ECG   When compared with ECG of 14-AUG-2018 09:39,   No significant change was found   Confirmed by [facility] Jacinta NORRIS ([id number]) on 7/10/2019 12:18:26 PM      Neuro/Psych:   Negative Neuro/Psych ROS              GI/Hepatic/Renal:   (+) GERD:, morbid obesity          Endo/Other: Negative Endo/Other ROS                    Abdominal:   (+) obese,     Abdomen: soft. Vascular: negative vascular ROS. Other Findings:           Anesthesia Plan      general     ASA 3       Induction: intravenous. MIPS: Prophylactic antiemetics administered. Anesthetic plan and risks discussed with patient. Use of blood products discussed with patient whom. Plan discussed with CRNA. Attending anesthesiologist reviewed and agrees with Preprocedure content              JOANNE Dozier - CRNA   7/16/2021         Pre Anesthesia Assessment complete.  Chart reviewed on 7/16/2021

## 2021-07-16 NOTE — PROGRESS NOTES
1140- Patient returned to [facility], report given to this nurse from [facility] in PACU. Patient A&O able to make needs known   1144- Patient sipping water. Renu Pena 647 at bedside. 1235- IV removed, patient sitting up on side of bed getting dressed  this nurse and mother Dariela Leavitt at bedside. 1245- Discharge instructions given to patient and her mother both verbalized understanding. [phone]- Patient escorted to car [address] where mother is transporting home.

## 2021-07-16 NOTE — OP NOTE
Operative Report    Patient ID:  iD Tripp  [id number]  39 y.o.  1980      Pre-operative Diagnosis: Right Inguinal hernia     Post-operative Diagnosis: same, incisional hernia    Procedure:   1. Robotic-Assisted Right LAURA inguinal hernia repair with mesh     2. Robotic assisted incisional hernia repair      3. Partial removal of right round ligament    Surgeon: Ivette Armstrong MD    First Assistant: Saritha Gonzales PA-C  The use of a first assistant was necessary for the proper positioning, prepping, and draping of the patient, intraoperative retraction, passing sutures and implants(like mesh), stapling bowel and vessels using  devises when necessary, and suction using laparoscopic instruments, exchanging DaVinci robotic instruments, passing sutures and closure of skin and subcutaneous tissues. Findings:  As above    Estimated Blood Loss:  Minimal           Total IV Fluids: 500 ml            Complications:  None; patient tolerated the procedure well. Disposition: PACU - hemodynamically stable. Condition: stable    Implants:    Implant Name Type Inv. Item Serial No.  Lot No. LRB No. Used Action   MESH JERRELL L W4.1XL6.2IN R POLYPR L PORE KNIT LT 3DMAX  MESH JERRELL L W4.1XL6.2IN R POLYPR L PORE KNIT LT 3DMAX  [facility]- [id number] Right 1 Implanted        Procedure Details: The patient was seen again in the Holding Room. The risks, benefits, complications, treatment options, and expected outcomes were discussed with the patient. The possibilities of reaction to medication, pulmonary aspiration, perforation of viscus, bleeding, recurrent infection, the need for additional procedures, and development of a complication requiring transfusion or further operation were discussed with the patient and/or family. There was concurrence with the proposed plan, and informed consent was obtained. The site of surgery was properly noted/marked.  The patient was taken to the Operating Room and the procedure verified. A Time Out was held and the above information confirmed. Indications: symptomatic  Right Inguinal hernia    Description of Procedure: The patient was brought to the operating room, and the site of surgery was verified. The patient was then placed supine with the arms tucked at the sides. After obtaining adequate anesthesia, the patients abdomen was prepped and draped in a standard sterile fashion. The patient was placed in the Trendelenburg position. Using a 5-mm optical trocar, the right mid abdominal quadrant was entered without incident. Pneumoperitoneum was created with insufflation to 12 mmHg. Two additional 8-mm trocar and 8-mm trocar were placed lateral to the rectus sheath under direct vision. The robot was docked without difficulty. There was omental attachment to the abdominal wall and once dissected there was an incisional hernia noted. Both inguinal regions were inspected. There was an incisional hernia and right inguina hernia noted. Then the median and medial umbilical ligaments were divided sharply with electrocautery to achieve the optimal exposure. The preperitoneum was incised with endoscopic scissors along a line of 2 cm above the superior edge of the hernia defect, extending from the median umbilical ligament to the anterior-superior iliac spine. The  peritoneal flap was mobilized inferiorly along with blunt and sharp dissection. The inferior epigastric vessels were exposed, and the pubic symphysis was identified. Coopers ligament was dissected to its junction with the femoral vein. The dissection was continued inferiorly to the iliopubic tract with care taken to avoid injury to the femoral branch of the genitofemoral nerve and the lateral femoral cutaneous nerve. The indirect inguinal hernia sac was identified and reduced with gentle traction. Pt has had hysterectomy arturo part of her round ligament was protruding into this.  The indirect inguinal hernia sac was also noted to be small and was easily mobilized from the round ligament was removed to provide good repair. This was sent to pathology as a specimen. A large 3-D max was rolled longitudinally and inserted through the trocar. The cylinder was placed along the inferior aspect of the working space and rolled into the place to completely cover the direct, indirect, and femoral spaces. The mesh was secured using 0- Vicryl in two places. The preperitoneal flap was closed over the mesh using the O-vicryl as well. Then the incisional hernia was repaired using #1 vicryl without mesh. After ensuring the adequate hemostasis using electrocautery, the trocars were removed and the pneumoperitoneum evacuated. The trocar incision was closed using 4-0 Vicryl, and dry dressings were applied as the final dressing. The patient tolerated the procedure well and was taken to the post-anesthesia care unit in stable condition. Instrument and lap counts were correct at the end of the case.      Kayla Baker MD

## 2021-07-16 NOTE — ANESTHESIA POSTPROCEDURE EVALUATION
Department of Anesthesiology  Postprocedure Note    Patient: Vinicius Mata  MRN: [id number]  YOB: 1980  Date of evaluation: 7/16/2021  Time:  10:58 AM     Procedure Summary     Date: 07/16/21 Room / Location: [address] [place] [address]    Anesthesia Start: [address] Anesthesia Stop: 1178    Procedure: ROBOTIC EXPLORATION, HERNIA RIGHT INGUINAL REPAIR AND VENTRAL HERNIA REPAIR LAPAROSCOPIC (Right Abdomen) Diagnosis: (RIGHT INGUINAL HERNIA)    Surgeons: Myrna Baker MD Responsible Provider: Alexandria Primrose, DO    Anesthesia Type: general ASA Status: 3          Anesthesia Type: general    Marge Phase I:      Marge Phase II:      Last vitals: Reviewed and per EMR flowsheets.        Anesthesia Post Evaluation    Patient location during evaluation: PACU  Patient participation: complete - patient participated  Level of consciousness: awake and alert  Pain score: 0  Airway patency: patent  Nausea & Vomiting: no nausea and no vomiting  Complications: no  Cardiovascular status: blood pressure returned to baseline and hemodynamically stable  Respiratory status: acceptable, spontaneous ventilation, nonlabored ventilation and face mask  Hydration status: stable

## 2021-07-28 ENCOUNTER — TELEPHONE (OUTPATIENT)
Dept: SURGERY | Age: 41
End: 2021-07-28

## 2021-07-28 NOTE — TELEPHONE ENCOUNTER
Its not very likely that it is related to the procedure, but Im happy to evaluate her tomorrow if she would like.

## 2021-07-28 NOTE — TELEPHONE ENCOUNTER
Patient called complaining of feet feel hot. Around her toes area. She is scheduled for her PO 8/2/21. She is not sure if this is an issue from her surgery she had on 7/16.  Please advise

## 2021-08-02 ENCOUNTER — OFFICE VISIT (OUTPATIENT)
Dept: SURGERY | Age: 41
End: 2021-08-02

## 2021-08-02 VITALS
HEART RATE: 55 BPM | BODY MASS INDEX: 34.86 KG/M2 | SYSTOLIC BLOOD PRESSURE: 120 MMHG | OXYGEN SATURATION: 91 % | DIASTOLIC BLOOD PRESSURE: 80 MMHG | WEIGHT: 230 LBS | HEIGHT: 68 IN

## 2021-08-02 DIAGNOSIS — Z09 POSTOPERATIVE EXAMINATION: ICD-10-CM

## 2021-08-02 DIAGNOSIS — K43.2 INCISIONAL HERNIA, WITHOUT OBSTRUCTION OR GANGRENE: Primary | ICD-10-CM

## 2021-08-02 PROCEDURE — 99024 POSTOP FOLLOW-UP VISIT: CPT | Performed by: SURGERY

## 2021-08-02 NOTE — PROGRESS NOTES
Chief Complaint   Patient presents with    Post-Op Check     21 ROBOTIC EXPLORATION, HERNIA RIGHT INGUINAL REPAIR AND VENTRAL HERNIA REPAIR LAPAROSCOPIC         SUBJECTIVE:  Patient here for post op visit. Pain is minimal.  Wounds: minbruising and no discharge.     Past Surgical History:   Procedure Laterality Date     SECTION       &     CHOLECYSTECTOMY  08/15/2018    laprascopic    HERNIA REPAIR Right 2021    ROBOTIC EXPLORATION, HERNIA RIGHT INGUINAL REPAIR AND VENTRAL HERNIA REPAIR LAPAROSCOPIC performed by Yane Lang MD at [address] [place] [address]    UPPER GASTROINTESTINAL ENDOSCOPY  2017     Past Medical History:   Diagnosis Date    Excessive daytime sleepiness 2017    GERD (gastroesophageal reflux disease)     Hypertension     Morbid obesity ([address] 2017     Family History   Problem Relation Age of Onset    Hypertension Mother     Hypertension Father     Hearing Loss Brother     High Blood Pressure Maternal Grandmother     Arthritis Maternal Grandfather      Social History     Socioeconomic History    Marital status: Single     Spouse name: Not on file    Number of children: Not on file    Years of education: Not on file    Highest education level: Not on file   Occupational History    Not on file   Tobacco Use    Smoking status: Never Smoker    Smokeless tobacco: Never Used   Vaping Use    Vaping Use: Never used   Substance and Sexual Activity    Alcohol use: Yes     Comment: social    Drug use: No    Sexual activity: Not on file   Other Topics Concern    Not on file   Social History Narrative    Not on file     Social Determinants of Health     Financial Resource Strain: Low Risk     Difficulty of Paying Living Expenses: Not hard at all   Food Insecurity: No Food Insecurity    Worried About Running Out of Food in the Last Year: Never true    Janneth of Food in the Last Year: Never true   Transportation Needs:     Lack of Transportation (Medical):  Lack of Transportation (Non-Medical):    Physical Activity:     Days of Exercise per Week:     Minutes of Exercise per Session:    Stress:     Feeling of Stress :    Social Connections:     Frequency of Communication with Friends and Family:     Frequency of Social Gatherings with Friends and Family:     Attends [employer] Services:     Active Member of Clubs or Organizations:     Attends Club or Organization Meetings:     Marital Status:    Intimate Partner Violence:     Fear of Current or Ex-Partner:     Emotionally Abused:     Physically Abused:     Sexually Abused:        OBJECTIVE:   Physical Exam    Wound well healed without signs of active infection. Suture line intact. Abdomen soft, nontender, nondistended. Path reveals:   Final Pathologic Diagnosis:   Soft tissue, round ligament (side not specified), excision:   -     Leiomyoma (0.6 cm). -     Smooth muscle and fibrovascular tissue. ASSESSMENT:  Patient doing well on this post operative check. Wounds well healed. 1. Incisional hernia, without obstruction or gangrene        PLAN:  F/u in 6 months  Continue same  Increase activity as tolerated        No orders of the defined types were placed in this encounter. No orders of the defined types were placed in this encounter. Follow Up: No follow-ups on file.     Joyce Yeager MD

## 2021-08-16 ENCOUNTER — OFFICE VISIT (OUTPATIENT)
Dept: SURGERY | Age: 41
End: 2021-08-16

## 2021-08-16 ENCOUNTER — TELEPHONE (OUTPATIENT)
Dept: SURGERY | Age: 41
End: 2021-08-16

## 2021-08-16 VITALS — HEART RATE: 71 BPM | HEIGHT: 68 IN | OXYGEN SATURATION: 99 % | BODY MASS INDEX: 34.86 KG/M2 | WEIGHT: 230 LBS

## 2021-08-16 DIAGNOSIS — Z09 POSTOPERATIVE EXAMINATION: Primary | ICD-10-CM

## 2021-08-16 PROCEDURE — 99024 POSTOP FOLLOW-UP VISIT: CPT | Performed by: SURGERY

## 2021-08-24 ENCOUNTER — OFFICE VISIT (OUTPATIENT)
Dept: OBGYN | Age: 41
End: 2021-08-24
Payer: COMMERCIAL

## 2021-08-24 VITALS
HEIGHT: 68 IN | SYSTOLIC BLOOD PRESSURE: 140 MMHG | BODY MASS INDEX: 35.61 KG/M2 | WEIGHT: 235 LBS | DIASTOLIC BLOOD PRESSURE: 103 MMHG

## 2021-08-24 DIAGNOSIS — N74 FEMALE PELVIC INFLAMMATORY DISORDERS IN DISEASES CLASSIFIED ELSEWHERE: ICD-10-CM

## 2021-08-24 DIAGNOSIS — Z01.419 WOMEN'S ANNUAL ROUTINE GYNECOLOGICAL EXAMINATION: Primary | ICD-10-CM

## 2021-08-24 DIAGNOSIS — N89.8 VAGINAL DISCHARGE: ICD-10-CM

## 2021-08-24 DIAGNOSIS — Z12.31 SCREENING MAMMOGRAM, ENCOUNTER FOR: ICD-10-CM

## 2021-08-24 PROCEDURE — 99396 PREV VISIT EST AGE 40-64: CPT | Performed by: OBSTETRICS & GYNECOLOGY

## 2021-08-24 ASSESSMENT — ENCOUNTER SYMPTOMS
ALLERGIC/IMMUNOLOGIC NEGATIVE: 1
RESPIRATORY NEGATIVE: 1
EYES NEGATIVE: 1
GASTROINTESTINAL NEGATIVE: 1

## 2021-08-24 NOTE — PROGRESS NOTES
8/24/21    Pieter LANDEROS Vic  1980    Chief Complaint   Patient presents with    Gynecologic Exam     annual exam, had hyster with [facility] , currently sexually active, c/o vaginal itching . ns        The patient is a 39 y.o. female, [id number] who presents for her annual exam. She is not menopausal . She reports no gynecological symptoms. She has had a hysterectomy with removal of right ovary. She is  sexually active. Pap smear history: Her last PAP smear was in prior to hysterectomy and normal.  Her results were normal.    Breast history: her most recent mammogram was in 2020.   The results were: Normal    Past Medical History:   Diagnosis Date    Excessive daytime sleepiness 12/13/2017    Fibromyalgia     GERD (gastroesophageal reflux disease)     Hernia of abdominal wall     Herpes simplex virus (HSV) infection     Hypertension     Hypertension     Morbid obesity ([address] 12/13/2017    Obesity        Past Surgical History:   Procedure Laterality Date   [address] & 2014    CHOLECYSTECTOMY  08/15/2018    laprascopic    COLONOSCOPY      DILATION AND CURETTAGE      ENDOMETRIAL ABLATION      HERNIA REPAIR Right 7/16/2021    ROBOTIC EXPLORATION, HERNIA RIGHT INGUINAL REPAIR AND VENTRAL HERNIA REPAIR LAPAROSCOPIC performed by Leodan Rodriguez MD at [address] [place] [address]    OVARY REMOVAL Right     SALPINGECTOMY Bilateral     UPPER GASTROINTESTINAL ENDOSCOPY  09/11/2017       Family History   Problem Relation Age of Onset    Hypertension Mother     Hypertension Father     Hearing Loss Brother     High Blood Pressure Maternal Grandmother     Arthritis Maternal Grandfather        Social History     Tobacco Use    Smoking status: Never Smoker    Smokeless tobacco: Never Used   Vaping Use    Vaping Use: Never used   Substance Use Topics    Alcohol use: Yes     Comment: social    Drug use: No       Current Outpatient Medications   Medication Sig Dispense Refill    LINZESS 145 MCG capsule       lisinopril (PRINIVIL;ZESTRIL) 20 MG tablet       Indomethacin (INDOCIN PO) Take by mouth PRN      Acetaminophen (TYLENOL 8 HOUR PO) Take by mouth      omeprazole (PRILOSEC) 40 MG delayed release capsule Take 40 mg by mouth daily (Patient not taking: Reported on 8/24/2021)       No current facility-administered medications for this visit. No Known Allergies    [id number]    Immunization History   Administered Date(s) Administered    COVID-19, Pfizer, PF, 30mcg/0.3mL 03/23/2021, 04/13/2021    MMR 03/24/2008    Tdap (Boostrix, Adacel) 03/24/2008       Review of Systems   Constitutional: Negative. Eyes: Negative. Respiratory: Negative. Cardiovascular: Negative. Gastrointestinal: Negative. Endocrine: Negative. Genitourinary: Positive for vaginal discharge. Musculoskeletal: Negative. Skin: Negative. Allergic/Immunologic: Negative. Neurological: Negative. Hematological: Negative. Psychiatric/Behavioral: Negative. BP (!) 140/103   Ht 5' 8\" (1.727 m)   Wt 235 lb (106.6 kg)   LMP 06/15/2019   BMI 35.73 kg/m²     Physical Exam  Exam conducted with a chaperone present. Constitutional:       Appearance: Normal appearance. HENT:      Head: Normocephalic and atraumatic. Nose: Nose normal.   Cardiovascular:      Rate and Rhythm: Normal rate. Pulses: Normal pulses. Pulmonary:      Effort: Pulmonary effort is normal.   Chest:      Chest wall: No mass, lacerations, deformity, swelling or tenderness. Breasts:         Right: Normal. No swelling, bleeding, inverted nipple, mass, nipple discharge, skin change or tenderness. Left: Normal. No swelling, bleeding, inverted nipple, mass, nipple discharge, skin change or tenderness. Abdominal:      General: Abdomen is flat. There is no distension. Palpations: Abdomen is soft. There is no mass. Tenderness: There is no abdominal tenderness.

## 2021-08-25 LAB
C. TRACHOMATIS DNA ,URINE: NEGATIVE
CANDIDA SPECIES, DNA PROBE: ABNORMAL
GARDNERELLA VAGINALIS, DNA PROBE: ABNORMAL
N. GONORRHOEAE DNA, URINE: NEGATIVE
TRICHOMONAS VAGINALIS DNA: ABNORMAL

## 2021-08-25 RX ORDER — METRONIDAZOLE 500 MG/1
500 TABLET ORAL 2 TIMES DAILY
Qty: 14 TABLET | Refills: 0 | Status: SHIPPED | OUTPATIENT
Start: 2021-08-25 | End: 2021-09-01

## 2021-09-06 NOTE — PROGRESS NOTES
Chief Complaint   Patient presents with    Post-Op Check     2nd p/o robot right inguinial hernia repair with incision repair and partial removal of right round ligmanet on 2021. SUBJECTIVE:  Patient here for post op visit. Pain is minimal.  Wounds: minbruising and no discharge.     Past Surgical History:   Procedure Laterality Date     SECTION       &     CHOLECYSTECTOMY  08/15/2018    laprascopic    COLONOSCOPY      DILATION AND CURETTAGE      ENDOMETRIAL ABLATION      HERNIA REPAIR Right 2021    ROBOTIC EXPLORATION, HERNIA RIGHT INGUINAL REPAIR AND VENTRAL HERNIA REPAIR LAPAROSCOPIC performed by Anthony Maynard MD at [address] [place] [address]    OVARY REMOVAL Right     SALPINGECTOMY Bilateral     UPPER GASTROINTESTINAL ENDOSCOPY  2017     Past Medical History:   Diagnosis Date    Excessive daytime sleepiness 2017    Fibromyalgia     GERD (gastroesophageal reflux disease)     Hernia of abdominal wall     Herpes simplex virus (HSV) infection     Hypertension     Hypertension     Morbid obesity ([address] 2017    Obesity      Family History   Problem Relation Age of Onset    Hypertension Mother     Hypertension Father     Hearing Loss Brother     High Blood Pressure Maternal Grandmother     Arthritis Maternal Grandfather      Social History     Socioeconomic History    Marital status: Single     Spouse name: Not on file    Number of children: Not on file    Years of education: Not on file    Highest education level: Not on file   Occupational History    Not on file   Tobacco Use    Smoking status: Never Smoker    Smokeless tobacco: Never Used   Vaping Use    Vaping Use: Never used   Substance and Sexual Activity    Alcohol use: Yes     Comment: social    Drug use: No    Sexual activity: Not on file   Other Topics Concern    Not on file   Social History Narrative    Not on file     Social Determinants of Health Financial Resource Strain: Low Risk     Difficulty of Paying Living Expenses: Not hard at all   Food Insecurity: No Food Insecurity    Worried About Running Out of Food in the Last Year: Never true    Janneth of Food in the Last Year: Never true   Transportation Needs:     Lack of Transportation (Medical):  Lack of Transportation (Non-Medical):    Physical Activity:     Days of Exercise per Week:     Minutes of Exercise per Session:    Stress:     Feeling of Stress :    Social Connections:     Frequency of Communication with Friends and Family:     Frequency of Social Gatherings with Friends and Family:     Attends [employer] Services:     Active Member of Clubs or Organizations:     Attends Club or Organization Meetings:     Marital Status:    Intimate Partner Violence:     Fear of Current or Ex-Partner:     Emotionally Abused:     Physically Abused:     Sexually Abused:        OBJECTIVE:   Physical Exam    Wound well healed without signs of active infection. Suture line intact. Abdomen soft, nontender, nondistended. ASSESSMENT:  Patient doing well on this post operative check. Wounds well healed. No diagnosis found. PLAN:  Continue same  Increase activity as tolerated        No orders of the defined types were placed in this encounter. No orders of the defined types were placed in this encounter. Follow Up: No follow-ups on file.     Tony Angel MD

## 2021-09-16 ENCOUNTER — TELEPHONE (OUTPATIENT)
Dept: OBGYN | Age: 41
End: 2021-09-16

## 2021-09-16 RX ORDER — VALACYCLOVIR HYDROCHLORIDE 500 MG/1
500 TABLET, FILM COATED ORAL 2 TIMES DAILY
Qty: 20 TABLET | Refills: 3 | Status: SHIPPED | OUTPATIENT
Start: 2021-09-16 | End: 2022-07-19

## 2022-01-03 ENCOUNTER — OFFICE VISIT (OUTPATIENT)
Dept: OBGYN | Age: 42
End: 2022-01-03
Payer: COMMERCIAL

## 2022-01-03 VITALS
DIASTOLIC BLOOD PRESSURE: 128 MMHG | HEIGHT: 68 IN | WEIGHT: 249 LBS | SYSTOLIC BLOOD PRESSURE: 169 MMHG | BODY MASS INDEX: 37.74 KG/M2 | HEART RATE: 83 BPM

## 2022-01-03 DIAGNOSIS — R03.0 ELEVATED BLOOD PRESSURE READING: ICD-10-CM

## 2022-01-03 DIAGNOSIS — N74 FEMALE PELVIC INFLAMMATORY DISORDERS IN DISEASES CLASSIFIED ELSEWHERE: ICD-10-CM

## 2022-01-03 DIAGNOSIS — Z12.31 SCREENING MAMMOGRAM FOR BREAST CANCER: ICD-10-CM

## 2022-01-03 DIAGNOSIS — N89.8 VAGINAL DISCHARGE: Primary | ICD-10-CM

## 2022-01-03 PROCEDURE — 1036F TOBACCO NON-USER: CPT | Performed by: OBSTETRICS & GYNECOLOGY

## 2022-01-03 PROCEDURE — 99214 OFFICE O/P EST MOD 30 MIN: CPT | Performed by: OBSTETRICS & GYNECOLOGY

## 2022-01-03 PROCEDURE — G8417 CALC BMI ABV UP PARAM F/U: HCPCS | Performed by: OBSTETRICS & GYNECOLOGY

## 2022-01-03 PROCEDURE — 36415 COLL VENOUS BLD VENIPUNCTURE: CPT | Performed by: OBSTETRICS & GYNECOLOGY

## 2022-01-03 PROCEDURE — G8427 DOCREV CUR MEDS BY ELIG CLIN: HCPCS | Performed by: OBSTETRICS & GYNECOLOGY

## 2022-01-03 PROCEDURE — G8484 FLU IMMUNIZE NO ADMIN: HCPCS | Performed by: OBSTETRICS & GYNECOLOGY

## 2022-01-03 RX ORDER — METRONIDAZOLE 500 MG/1
500 TABLET ORAL 2 TIMES DAILY
Qty: 14 TABLET | Refills: 0 | Status: SHIPPED | OUTPATIENT
Start: 2022-01-03 | End: 2022-01-10

## 2022-01-03 ASSESSMENT — ENCOUNTER SYMPTOMS
EYES NEGATIVE: 1
GASTROINTESTINAL NEGATIVE: 1
ALLERGIC/IMMUNOLOGIC NEGATIVE: 1
RESPIRATORY NEGATIVE: 1

## 2022-01-03 NOTE — PROGRESS NOTES
1/3/22    Sejal Mckeon N Vic  1980    Chief Complaint   Patient presents with    Vaginal Discharge     clear vaginal discharge with odor x 2 wks.          Donn Murphy is a 39 y.o. female who presents today for evaluation of vaginal discharge        Past Medical History:   Diagnosis Date    Excessive daytime sleepiness 12/13/2017    Fibromyalgia     GERD (gastroesophageal reflux disease)     Hernia of abdominal wall     Herpes simplex virus (HSV) infection     Hypertension     Hypertension     Morbid obesity ([address] 12/13/2017    Obesity        Past Surgical History:   Procedure Laterality Date   [address] & [address]    CHOLECYSTECTOMY  08/15/2018    laprascopic    COLONOSCOPY      DILATION AND CURETTAGE      ENDOMETRIAL ABLATION      HERNIA REPAIR Right 7/16/2021    ROBOTIC EXPLORATION, HERNIA RIGHT INGUINAL REPAIR AND VENTRAL HERNIA REPAIR LAPAROSCOPIC performed by Silver Youssef MD at [address] [place] [address]    LAV    OVARY REMOVAL Right     SALPINGECTOMY Bilateral     UPPER GASTROINTESTINAL ENDOSCOPY  09/11/2017       Social History     Tobacco Use    Smoking status: Never Smoker    Smokeless tobacco: Never Used   Vaping Use    Vaping Use: Never used   Substance Use Topics    Alcohol use: Yes     Comment: social    Drug use: No       Family History   Problem Relation Age of Onset    Hypertension Mother     Hypertension Father     Hearing Loss Brother     High Blood Pressure Maternal Grandmother     Arthritis Maternal Grandfather        Current Outpatient Medications   Medication Sig Dispense Refill    Blood Pressure Monitoring (SPHYGMOMANOMETER) MISC 1 each by Does not apply route daily 1 each 0    metroNIDAZOLE (FLAGYL) 500 MG tablet Take 1 tablet by mouth 2 times daily for 7 days 14 tablet 0    valACYclovir (VALTREX) 500 MG tablet Take 1 tablet by mouth 2 times daily 20 tablet 3    LINZESS 145 MCG capsule       lisinopril (PRINIVIL;ZESTRIL) 20 No prolapse, urethral pain or urethral lesion. Vagina: Vaginal discharge present. No erythema, tenderness or lesions. Cervix: No cervical motion tenderness, discharge, friability, lesion, erythema or cervical bleeding. Uterus: Normal.       Adnexa: Right adnexa normal and left adnexa normal.        Right: No mass or tenderness. Left: No mass or tenderness. Musculoskeletal:      Cervical back: Normal range of motion and neck supple. Lymphadenopathy:      Lower Body: No right inguinal adenopathy. No left inguinal adenopathy. Skin:     General: Skin is warm and dry. Neurological:      General: No focal deficit present. Mental Status: She is alert and oriented to person, place, and time. No results found for this visit on 22. ASSESSMENT AND PLAN   Diagnosis Orders   1. Vaginal discharge  Vaginal Pathogens Probes *A    C.trachomatis N.gonorrhoeae DNA    metroNIDAZOLE (FLAGYL) 500 MG tablet   2. Female pelvic inflammatory disorders in diseases classified elsewhere  Vaginal Pathogens Probes *A    C.trachomatis N.gonorrhoeae DNA    metroNIDAZOLE (FLAGYL) 500 MG tablet   3. Elevated blood pressure reading  CBC Auto Differential    Comprehensive Metabolic Panel    TSH WITH REFLEX TO FT4   4. Screening mammogram for breast cancer  RANDY DIGITAL SCREEN W OR WO CAD BILATERAL     Patient reports she had stressful day. Client  today. Stress at work. Recommend she see pcp. Home bp monitoring. To er for chest pain, headache, shortness of breath, visual changes    Return in about 1 week (around 1/10/2022).     Cas Ramirez MD

## 2022-01-04 LAB
C TRACH DNA GENITAL QL NAA+PROBE: NEGATIVE
CANDIDA SPECIES, DNA PROBE: NORMAL
GARDNERELLA VAGINALIS, DNA PROBE: NORMAL
N. GONORRHOEAE DNA: NEGATIVE
TRICHOMONAS VAGINALIS DNA: NORMAL

## 2022-01-11 ENCOUNTER — TELEPHONE (OUTPATIENT)
Dept: FAMILY MEDICINE CLINIC | Age: 42
End: 2022-01-11

## 2022-01-11 NOTE — TELEPHONE ENCOUNTER
----- Message from Reba Leblanc sent at 2022 11:44 AM EST -----  Subject: Appointment Request    Reason for Call: New Patient Request Appointment    QUESTIONS  Type of Appointment? Established Patient  Reason for appointment request? No appointments available during search  Additional Information for Provider? Patient called in trying to schedule   a new patient appointment, ex=stablishing care with Dr. Krunal Adorno. She   also had questions about her blood pressure. There were no appointments   available during search. Please reach out to schedule  ---------------------------------------------------------------------------  --------------  CALL BACK INFO  What is the best way for the office to contact you? OK to leave message on   voicemail  Preferred Call Back Phone Number? [phone]  ---------------------------------------------------------------------------  --------------  SCRIPT ANSWERS  Relationship to Patient? Self  Specialty Confirmation? Primary Care  Is this the first appointment to establish care for a ? No  Have you been diagnosed with, awaiting test results for, or told that you   are suspected of having COVID-19 (Coronavirus)? (If patient has tested   negative or was tested as a requirement for work, school, or travel and   not based on symptoms, answer no)? No  Within the past two weeks have you developed any of the following symptoms   (answer no if symptoms have been present longer than 2 weeks or began   more than 2 weeks ago)? Fever or Chills, Cough, Shortness of breath or   difficulty breathing, Loss of taste or smell, Sore throat, Nasal   congestion, Sneezing or runny nose, Fatigue or generalized body aches   (answer no if pain is specific to a body part e.g. back pain), Diarrhea,   Headache? No  Have you had close contact with someone with COVID-19 in the last 14 days? No  (Service Expert  click yes below to proceed with [facility]ual   Scheduling)?  Yes

## 2022-01-12 ENCOUNTER — OFFICE VISIT (OUTPATIENT)
Dept: FAMILY MEDICINE CLINIC | Age: 42
End: 2022-01-12
Payer: COMMERCIAL

## 2022-01-12 VITALS
HEART RATE: 72 BPM | OXYGEN SATURATION: 98 % | RESPIRATION RATE: 16 BRPM | SYSTOLIC BLOOD PRESSURE: 132 MMHG | HEIGHT: 68 IN | WEIGHT: 248 LBS | DIASTOLIC BLOOD PRESSURE: 79 MMHG | BODY MASS INDEX: 37.59 KG/M2

## 2022-01-12 DIAGNOSIS — I10 ESSENTIAL HYPERTENSION: Primary | ICD-10-CM

## 2022-01-12 PROCEDURE — 99213 OFFICE O/P EST LOW 20 MIN: CPT | Performed by: STUDENT IN AN ORGANIZED HEALTH CARE EDUCATION/TRAINING PROGRAM

## 2022-01-12 PROCEDURE — G8484 FLU IMMUNIZE NO ADMIN: HCPCS | Performed by: STUDENT IN AN ORGANIZED HEALTH CARE EDUCATION/TRAINING PROGRAM

## 2022-01-12 PROCEDURE — 1036F TOBACCO NON-USER: CPT | Performed by: STUDENT IN AN ORGANIZED HEALTH CARE EDUCATION/TRAINING PROGRAM

## 2022-01-12 PROCEDURE — G8417 CALC BMI ABV UP PARAM F/U: HCPCS | Performed by: STUDENT IN AN ORGANIZED HEALTH CARE EDUCATION/TRAINING PROGRAM

## 2022-01-12 PROCEDURE — G8427 DOCREV CUR MEDS BY ELIG CLIN: HCPCS | Performed by: STUDENT IN AN ORGANIZED HEALTH CARE EDUCATION/TRAINING PROGRAM

## 2022-01-12 RX ORDER — AMLODIPINE BESYLATE 5 MG/1
5 TABLET ORAL DAILY
Qty: 30 TABLET | Refills: 5 | Status: SHIPPED | OUTPATIENT
Start: 2022-01-12 | End: 2022-02-21 | Stop reason: SDUPTHER

## 2022-01-17 LAB
A/G RATIO: 1.2 (ref 1.1–2.2)
ALBUMIN SERPL-MCNC: 4.3 G/DL (ref 3.4–5)
ALP BLD-CCNC: 79 U/L (ref 40–129)
ALT SERPL-CCNC: 18 U/L (ref 10–40)
ANION GAP SERPL CALCULATED.3IONS-SCNC: 13 MMOL/L (ref 3–16)
AST SERPL-CCNC: 22 U/L (ref 15–37)
BASOPHILS ABSOLUTE: 0 K/UL (ref 0–0.2)
BASOPHILS RELATIVE PERCENT: 0.5 %
BILIRUB SERPL-MCNC: 0.4 MG/DL (ref 0–1)
BUN BLDV-MCNC: 13 MG/DL (ref 7–20)
CALCIUM SERPL-MCNC: 9.9 MG/DL (ref 8.3–10.6)
CHLORIDE BLD-SCNC: 101 MMOL/L (ref 99–110)
CO2: 25 MMOL/L (ref 21–32)
CREAT SERPL-MCNC: 0.8 MG/DL (ref 0.6–1.1)
EOSINOPHILS ABSOLUTE: 0.1 K/UL (ref 0–0.6)
EOSINOPHILS RELATIVE PERCENT: 0.9 %
GFR AFRICAN AMERICAN: >60
GFR NON-AFRICAN AMERICAN: >60
GLUCOSE BLD-MCNC: 91 MG/DL (ref 70–99)
HCT VFR BLD CALC: 41.6 % (ref 36–48)
HEMOGLOBIN: 14.4 G/DL (ref 12–16)
LYMPHOCYTES ABSOLUTE: 1.7 K/UL (ref 1–5.1)
LYMPHOCYTES RELATIVE PERCENT: 27.3 %
MCH RBC QN AUTO: 28.2 PG (ref 26–34)
MCHC RBC AUTO-ENTMCNC: 34.6 G/DL (ref 31–36)
MCV RBC AUTO: 81.5 FL (ref 80–100)
MONOCYTES ABSOLUTE: 0.3 K/UL (ref 0–1.3)
MONOCYTES RELATIVE PERCENT: 4.7 %
NEUTROPHILS ABSOLUTE: 4 K/UL (ref 1.7–7.7)
NEUTROPHILS RELATIVE PERCENT: 66.6 %
PDW BLD-RTO: 13.9 % (ref 12.4–15.4)
PLATELET # BLD: 285 K/UL (ref 135–450)
PMV BLD AUTO: 8.7 FL (ref 5–10.5)
POTASSIUM SERPL-SCNC: 4 MMOL/L (ref 3.5–5.1)
RBC # BLD: 5.11 M/UL (ref 4–5.2)
SODIUM BLD-SCNC: 139 MMOL/L (ref 136–145)
TOTAL PROTEIN: 8 G/DL (ref 6.4–8.2)
TSH REFLEX FT4: 1.87 UIU/ML (ref 0.27–4.2)
WBC # BLD: 6.1 K/UL (ref 4–11)

## 2022-01-24 ENCOUNTER — OFFICE VISIT (OUTPATIENT)
Dept: OBGYN | Age: 42
End: 2022-01-24
Payer: COMMERCIAL

## 2022-01-24 VITALS
WEIGHT: 244 LBS | DIASTOLIC BLOOD PRESSURE: 102 MMHG | BODY MASS INDEX: 36.98 KG/M2 | SYSTOLIC BLOOD PRESSURE: 154 MMHG | HEIGHT: 68 IN

## 2022-01-24 DIAGNOSIS — I10 ESSENTIAL HYPERTENSION: Primary | ICD-10-CM

## 2022-01-24 DIAGNOSIS — E66.9 OBESITY (BMI 35.0-39.9 WITHOUT COMORBIDITY): ICD-10-CM

## 2022-01-24 PROCEDURE — G8417 CALC BMI ABV UP PARAM F/U: HCPCS | Performed by: OBSTETRICS & GYNECOLOGY

## 2022-01-24 PROCEDURE — 99213 OFFICE O/P EST LOW 20 MIN: CPT | Performed by: OBSTETRICS & GYNECOLOGY

## 2022-01-24 PROCEDURE — 1036F TOBACCO NON-USER: CPT | Performed by: OBSTETRICS & GYNECOLOGY

## 2022-01-24 PROCEDURE — G8484 FLU IMMUNIZE NO ADMIN: HCPCS | Performed by: OBSTETRICS & GYNECOLOGY

## 2022-01-24 PROCEDURE — G8427 DOCREV CUR MEDS BY ELIG CLIN: HCPCS | Performed by: OBSTETRICS & GYNECOLOGY

## 2022-01-24 ASSESSMENT — ENCOUNTER SYMPTOMS
EYES NEGATIVE: 1
ALLERGIC/IMMUNOLOGIC NEGATIVE: 1
RESPIRATORY NEGATIVE: 1
GASTROINTESTINAL NEGATIVE: 1

## 2022-01-24 NOTE — PROGRESS NOTES
1/24/22    Brendan LANDEROS C/Abby [address]  1980    Chief Complaint   Patient presents with    Follow-up     Pt here for follow up on elevated bp, Pt states she has headache for a month minus 3 days. Pt also had labs last visit. Camilla Gifford is a 39 y.o. female who presents today for evaluation of elevated BP. Found a PCP and she has been taking norvasc 5mg daily for one week.       Past Medical History:   Diagnosis Date    Excessive daytime sleepiness 12/13/2017    Fibromyalgia     GERD (gastroesophageal reflux disease)     Hernia of abdominal wall     Herpes simplex virus (HSV) infection     Hypertension     Hypertension     Morbid obesity ([address] 12/13/2017    Obesity        Past Surgical History:   Procedure Laterality Date   [address] [facility] [address] & 2014    CHOLECYSTECTOMY  08/15/2018    laprascopic    COLONOSCOPY      DILATION AND CURETTAGE      ENDOMETRIAL ABLATION      HERNIA REPAIR Right 7/16/2021    ROBOTIC EXPLORATION, HERNIA RIGHT INGUINAL REPAIR AND VENTRAL HERNIA REPAIR LAPAROSCOPIC performed by Amber Casey MD at [address] [place] [address]    OVARY REMOVAL Right     SALPINGECTOMY Bilateral     UPPER GASTROINTESTINAL ENDOSCOPY  09/11/2017       Social History     Tobacco Use    Smoking status: Never Smoker    Smokeless tobacco: Never Used   Vaping Use    Vaping Use: Never used   Substance Use Topics    Alcohol use: Yes     Comment: social    Drug use: No       Family History   Problem Relation Age of Onset    Hypertension Mother     Hypertension Father     Hearing Loss Brother     High Blood Pressure Maternal Grandmother     Arthritis Maternal Grandfather        Current Outpatient Medications   Medication Sig Dispense Refill    amLODIPine (NORVASC) 5 MG tablet Take 1 tablet by mouth daily 30 tablet 5    valACYclovir (VALTREX) 500 MG tablet Take 1 tablet by mouth 2 times daily 20 tablet 3    LINZESS 145 MCG capsule       lisinopril (PRINIVIL;ZESTRIL) 20 MG tablet  (Patient not taking: Reported on 1/12/2022)      Indomethacin (INDOCIN PO) Take by mouth PRN      Acetaminophen (TYLENOL 8 HOUR PO) Take by mouth      omeprazole (PRILOSEC) 40 MG delayed release capsule Take 40 mg by mouth daily (Patient not taking: Reported on 8/24/2021)       No current facility-administered medications for this visit. No Known Allergies    [id number]    Immunization History   Administered Date(s) Administered    COVID-19, Pfizer Purple top, DILUTE for use, 12+ yrs, 30mcg/0.3mL dose 03/23/2021, 04/13/2021    MMR 03/24/2008    Tdap (Boostrix, Adacel) 03/24/2008       Review of Systems   Constitutional: Negative. Eyes: Negative. Respiratory: Negative. Cardiovascular: Negative. Gastrointestinal: Negative. Endocrine: Negative. Genitourinary: Negative. Musculoskeletal: Negative. Skin: Negative. Allergic/Immunologic: Negative. Neurological: Negative. Hematological: Negative. Psychiatric/Behavioral: Negative. BP (!) 154/102   Ht 5' 8\" (1.727 m)   Wt 244 lb (110.7 kg)   LMP 06/15/2019   BMI 37.10 kg/m²     Physical Exam  Constitutional:       General: She is not in acute distress. Appearance: Normal appearance. She is not ill-appearing. HENT:      Head: Normocephalic. Nose: Nose normal.   Eyes:      General: No scleral icterus. Right eye: No discharge. Left eye: No discharge. Cardiovascular:      Pulses: Normal pulses. Pulmonary:      Effort: Pulmonary effort is normal.   Abdominal:      General: Abdomen is flat. There is no distension. Palpations: Abdomen is soft. There is no mass. Tenderness: There is no abdominal tenderness. Hernia: No hernia is present. Musculoskeletal:         General: Normal range of motion. Cervical back: Normal range of motion and neck supple. No rigidity. Skin:     General: Skin is warm and dry.    Neurological:      General: No focal deficit present. Mental Status: She is alert and oriented to person, place, and time. Psychiatric:         Mood and Affect: Mood normal.         Behavior: Behavior normal.       01/17/2022 0828 01/17/2022 1610 CBC Auto Differential [[id number]]    Blood    Component Value Units   WBC 6.1 K/uL   RBC 5.11 M/uL   Hemoglobin 14.4 g/dL   Hematocrit 41.6 %   MCV 81.5 fL   MCH 28.2 pg   MCHC 34.6 g/dL   RDW 13.9 %   Platelets 596 K/uL   MPV 8.7 fL   Neutrophils % 66.6 %   Lymphocytes % 27.3 %   Monocytes % 4.7 %   Eosinophils % 0.9 %   Basophils % 0.5 %   Neutrophils Absolute 4.0 K/uL   Lymphocytes Absolute 1.7 K/uL   Monocytes Absolute 0.3 K/uL   Eosinophils Absolute 0.1 K/uL   Basophils Absolute 0.0 K/uL           01/17/2022 0828 01/17/2022 1630 Comprehensive Metabolic Panel [[id number]]    Blood    Component Value Units   Sodium 139 mmol/L   Potassium 4.0 mmol/L   Chloride 101 mmol/L   CO2 25 mmol/L   Anion Gap 13    Glucose 91 mg/dL   BUN 13 mg/dL   CREATININE 0.8 mg/dL   GFR Non-African American >60     GFR African American >60     Calcium 9.9 mg/dL   Total Protein 8.0 g/dL   Albumin 4.3 g/dL   Albumin/Globulin Ratio 1.2    Total Bilirubin 0.4 mg/dL   Alkaline Phosphatase 79 U/L   ALT 18 U/L   AST 22 U/L           01/17/2022 0828 01/17/2022 1629 TSH WITH REFLEX TO FT4 [[id number]]    Blood    Component Value Units   TSH Reflex FT4 1.87 uIU/mL               No results found for this visit on 01/24/22. ASSESSMENT AND PLAN   Diagnosis Orders   1. Essential hypertension     2. Obesity (BMI 35.0-39.9 without comorbidity)       Discussed low sodium diet  Discussed weight loss, has appointment with dietician  Reports she has found PCP. disucssed that she should follow up with pcp and she has scheduled follow up in 2 weeks. No follow-ups on file.     Shanelle Ball MD

## 2022-01-27 ASSESSMENT — ENCOUNTER SYMPTOMS
SHORTNESS OF BREATH: 0
ABDOMINAL PAIN: 0
SORE THROAT: 0
WHEEZING: 0
NAUSEA: 0

## 2022-02-21 ENCOUNTER — TELEPHONE (OUTPATIENT)
Dept: FAMILY MEDICINE CLINIC | Age: 42
End: 2022-02-21

## 2022-02-21 ENCOUNTER — OFFICE VISIT (OUTPATIENT)
Dept: FAMILY MEDICINE CLINIC | Age: 42
End: 2022-02-21
Payer: COMMERCIAL

## 2022-02-21 VITALS
BODY MASS INDEX: 37.59 KG/M2 | HEIGHT: 68 IN | WEIGHT: 248 LBS | DIASTOLIC BLOOD PRESSURE: 82 MMHG | SYSTOLIC BLOOD PRESSURE: 132 MMHG | RESPIRATION RATE: 16 BRPM | OXYGEN SATURATION: 96 % | HEART RATE: 78 BPM

## 2022-02-21 DIAGNOSIS — M54.32 SCIATICA, LEFT SIDE: Primary | ICD-10-CM

## 2022-02-21 DIAGNOSIS — L70.0 ACNE VULGARIS: ICD-10-CM

## 2022-02-21 DIAGNOSIS — I10 ESSENTIAL HYPERTENSION: ICD-10-CM

## 2022-02-21 DIAGNOSIS — L70.0 CYSTIC ACNE: Primary | ICD-10-CM

## 2022-02-21 PROCEDURE — G8427 DOCREV CUR MEDS BY ELIG CLIN: HCPCS | Performed by: STUDENT IN AN ORGANIZED HEALTH CARE EDUCATION/TRAINING PROGRAM

## 2022-02-21 PROCEDURE — 1036F TOBACCO NON-USER: CPT | Performed by: STUDENT IN AN ORGANIZED HEALTH CARE EDUCATION/TRAINING PROGRAM

## 2022-02-21 PROCEDURE — 99214 OFFICE O/P EST MOD 30 MIN: CPT | Performed by: STUDENT IN AN ORGANIZED HEALTH CARE EDUCATION/TRAINING PROGRAM

## 2022-02-21 PROCEDURE — G8417 CALC BMI ABV UP PARAM F/U: HCPCS | Performed by: STUDENT IN AN ORGANIZED HEALTH CARE EDUCATION/TRAINING PROGRAM

## 2022-02-21 PROCEDURE — G8484 FLU IMMUNIZE NO ADMIN: HCPCS | Performed by: STUDENT IN AN ORGANIZED HEALTH CARE EDUCATION/TRAINING PROGRAM

## 2022-02-21 RX ORDER — AMLODIPINE BESYLATE 5 MG/1
5 TABLET ORAL DAILY
Qty: 90 TABLET | Refills: 1 | Status: SHIPPED | OUTPATIENT
Start: 2022-02-21 | End: 2022-07-19

## 2022-02-21 ASSESSMENT — ENCOUNTER SYMPTOMS
ABDOMINAL PAIN: 0
NAUSEA: 0
SHORTNESS OF BREATH: 0
SORE THROAT: 0
WHEEZING: 0

## 2022-02-21 NOTE — PROGRESS NOTES
2/25/2022    [address]    Chief Complaint   Patient presents with    1 Month Follow-Up     Presenting for 1 month follow up visit, hypertension. HPI  History was obtained from patient. Rony Crouch is a 39 y.o. female with a PMHx as listed below who presents today for   BP much improved on current regimen    Notes fibromyalgia/left sided sciatica worse does not want to be on medicaitons for this. Patient would like referral to dermatology. Some cystic acne on her cheeks not improving with OTC    1. Sciatica, left side    2. Essential hypertension             REVIEW OF SYMPTOMS    Review of Systems   Constitutional: Negative for chills and fatigue. HENT: Negative for congestion and sore throat. Respiratory: Negative for shortness of breath and wheezing. Cardiovascular: Negative for chest pain and palpitations. Gastrointestinal: Negative for abdominal pain and nausea. Genitourinary: Negative for frequency and urgency. Neurological: Negative for light-headedness.        PAST MEDICAL HISTORY  Past Medical History:   Diagnosis Date    Excessive daytime sleepiness 12/13/2017    Fibromyalgia     GERD (gastroesophageal reflux disease)     Hernia of abdominal wall     Herpes simplex virus (HSV) infection     Hypertension     Hypertension     Morbid obesity ([address] 12/13/2017    Obesity        FAMILY HISTORY  Family History   Problem Relation Age of Onset    Hypertension Mother     Hypertension Father     Hearing Loss Brother     High Blood Pressure Maternal Grandmother     Arthritis Maternal Grandfather        SOCIAL HISTORY  Social History     Socioeconomic History    Marital status: Single     Spouse name: None    Number of children: None    Years of education: None    Highest education level: None   Occupational History    None   Tobacco Use    Smoking status: Never Smoker    Smokeless tobacco: Never Used   Vaping Use    Vaping Use: Never used   Substance and Sexual Activity    Alcohol use: Yes     Comment: social    Drug use: No    Sexual activity: None   Other Topics Concern    None   Social History Narrative    None     Social Determinants of Health     Financial Resource Strain: Low Risk     Difficulty of Paying Living Expenses: Not hard at all   Food Insecurity: No Food Insecurity    Worried About Running Out of Food in the Last Year: Never true    Janneth of Food in the Last Year: Never true   Transportation Needs:     Lack of Transportation (Medical): Not on file    Lack of Transportation (Non-Medical):  Not on file   Physical Activity:     Days of Exercise per Week: Not on file    Minutes of Exercise per Session: Not on file   Stress:     Feeling of Stress : Not on file   Social Connections:     Frequency of Communication with Friends and Family: Not on file    Frequency of Social Gatherings with Friends and Family: Not on file    Attends Christianity Services: Not on file    Active Member of [address] [place] [address] or Organizations: Not on file    Attends Club or Organization Meetings: Not on file    Marital Status: Not on file   Intimate Partner Violence:     Fear of Current or Ex-Partner: Not on file    Emotionally Abused: Not on file    Physically Abused: Not on file    Sexually Abused: Not on file   Housing Stability:     Unable to Pay for Housing in the Last Year: Not on file    Number of [place] in the Last Year: Not on file    Unstable Housing in the Last Year: Not on file        SURGICAL HISTORY  Past Surgical History:   Procedure Laterality Date   [address] & 2014    CHOLECYSTECTOMY  08/15/2018    laprascopic    COLONOSCOPY      DILATION AND CURETTAGE      [facility]ETRIAL ABLATION     [address] [facility] [facility]16[facility]2021    ROBOTIC EXPLORATION, HERNIA RIGHT [address] performed by Chelsie Maynard MD at [address] [place] [address]    OVARY REMOVAL Right     SALPINGECTOMY Bilateral  UPPER GASTROINTESTINAL ENDOSCOPY  09/11/2017                 CURRENT MEDICATIONS  Current Outpatient Medications   Medication Sig Dispense Refill    amLODIPine (NORVASC) 5 MG tablet Take 1 tablet by mouth daily 90 tablet 1    valACYclovir (VALTREX) 500 MG tablet Take 1 tablet by mouth 2 times daily 20 tablet 3    LINZESS 145 MCG capsule       Indomethacin (INDOCIN PO) Take by mouth PRN      Acetaminophen (TYLENOL 8 HOUR PO) Take by mouth      tretinoin (RETIN-A) 0.05 % cream Apply topically nightly. 90 g 2    clindamycin-benzoyl peroxide (BENZACLIN) 1-5 % gel Apply pea sized amount to effected area topically daily 50 g 2    lisinopril (PRINIVIL;ZESTRIL) 20 MG tablet  (Patient not taking: Reported on 1/12/2022)      omeprazole (PRILOSEC) 40 MG delayed release capsule Take 40 mg by mouth daily (Patient not taking: Reported on 8/24/2021)       No current facility-administered medications for this visit. ALLERGIES  No Known Allergies    PHYSICAL EXAM    /82   Pulse 78   Resp 16   Ht 5' 8\" (1.727 m)   Wt 248 lb (112.5 kg)   LMP 06/15/2019   SpO2 96%   BMI 37.71 kg/m²     Physical Exam  Constitutional:       Appearance: Normal appearance. HENT:      Head: Normocephalic and atraumatic. Eyes:      Extraocular Movements: Extraocular movements intact. Pupils: Pupils are equal, round, and reactive to light. Cardiovascular:      Rate and Rhythm: Normal rate and regular rhythm. Pulses: Normal pulses. Heart sounds: No murmur heard. No friction rub. No gallop. Musculoskeletal:      Cervical back: Neck supple. Skin:     General: Skin is warm and dry. Comments: comeodones b/l cheeks   Neurological:      General: No focal deficit present. Mental Status: She is alert. Psychiatric:         Mood and Affect: Mood normal.         Behavior: Behavior normal.         ASSESSMENT & PLAN    1.  Essential hypertension  -bp stable on current regimen  - amLODIPine (NORVASC) 5 MG tablet; Take 1 tablet by mouth daily  Dispense: 90 tablet; Refill: 1    2. Sciatica, left side  -discussed exercises does not want medications at this time    Declines flu, covid booster    Cystic acne  -start topical therapy clindamycin, retin-a  -will refer to dermatology per patient request    Return in about 6 months (around 8/21/2022).          Electronically signed by Hugo Negron DO on 2/25/2022

## 2022-02-21 NOTE — TELEPHONE ENCOUNTER
Patient called and requested a referral    To [facility]  Has cystic acne on her face   Phone [phone]

## 2022-02-22 ENCOUNTER — OFFICE VISIT MH/BH (OUTPATIENT)
Dept: BARIATRICS/WEIGHT MGMT | Age: 42
End: 2022-02-22
Payer: COMMERCIAL

## 2022-02-22 VITALS
SYSTOLIC BLOOD PRESSURE: 138 MMHG | BODY MASS INDEX: 37.86 KG/M2 | HEART RATE: 85 BPM | HEIGHT: 68 IN | WEIGHT: 249.8 LBS | DIASTOLIC BLOOD PRESSURE: 90 MMHG | OXYGEN SATURATION: 98 %

## 2022-02-22 DIAGNOSIS — I10 ESSENTIAL HYPERTENSION: ICD-10-CM

## 2022-02-22 DIAGNOSIS — Z79.899 MEDICATION MANAGEMENT: ICD-10-CM

## 2022-02-22 DIAGNOSIS — Z12.31 SCREENING MAMMOGRAM FOR BREAST CANCER: ICD-10-CM

## 2022-02-22 PROCEDURE — G8417 CALC BMI ABV UP PARAM F/U: HCPCS | Performed by: NURSE PRACTITIONER

## 2022-02-22 PROCEDURE — G8484 FLU IMMUNIZE NO ADMIN: HCPCS | Performed by: NURSE PRACTITIONER

## 2022-02-22 PROCEDURE — G8427 DOCREV CUR MEDS BY ELIG CLIN: HCPCS | Performed by: NURSE PRACTITIONER

## 2022-02-22 PROCEDURE — 99214 OFFICE O/P EST MOD 30 MIN: CPT | Performed by: NURSE PRACTITIONER

## 2022-02-22 PROCEDURE — 1036F TOBACCO NON-USER: CPT | Performed by: NURSE PRACTITIONER

## 2022-02-22 RX ORDER — TRETINOIN 0.5 MG/G
CREAM TOPICAL
Qty: 90 G | Refills: 2 | Status: SHIPPED | OUTPATIENT
Start: 2022-02-22 | End: 2022-03-24

## 2022-02-22 RX ORDER — CLINDAMYCIN AND BENZOYL PEROXIDE 10; 50 MG/G; MG/G
GEL TOPICAL
Qty: 50 G | Refills: 2 | Status: SHIPPED | OUTPATIENT
Start: 2022-02-22

## 2022-02-22 ASSESSMENT — PATIENT HEALTH QUESTIONNAIRE - PHQ9
SUM OF ALL RESPONSES TO PHQ9 QUESTIONS 1 & 2: 0
SUM OF ALL RESPONSES TO PHQ QUESTIONS 1-9: 0
1. LITTLE INTEREST OR PLEASURE IN DOING THINGS: 0
SUM OF ALL RESPONSES TO PHQ QUESTIONS 1-9: 0
SUM OF ALL RESPONSES TO PHQ QUESTIONS 1-9: 0
2. FEELING DOWN, DEPRESSED OR HOPELESS: 0
SUM OF ALL RESPONSES TO PHQ QUESTIONS 1-9: 0

## 2022-02-22 ASSESSMENT — ENCOUNTER SYMPTOMS
EYES NEGATIVE: 1
CONSTIPATION: 1

## 2022-02-22 NOTE — PROGRESS NOTES
Social History     Socioeconomic History    Marital status: Single     Spouse name: Not on file    Number of children: Not on file    Years of education: Not on file    Highest education level: Not on file   Occupational History    Not on file   Tobacco Use    Smoking status: Never Smoker    Smokeless tobacco: Never Used   Vaping Use    Vaping Use: Never used   Substance and Sexual Activity    Alcohol use: Yes     Comment: social    Drug use: No    Sexual activity: Not on file   Other Topics Concern    Not on file   Social History Narrative    Not on file     Social Determinants of Health     Financial Resource Strain: Low Risk     Difficulty of Paying Living Expenses: Not hard at all   Food Insecurity: No Food Insecurity    Worried About [address] in the Last Year: Never true    [address] in the Last Year: Never true   Transportation Needs:     Lack of Transportation (Medical): Not on file    Lack of Transportation (Non-Medical):  Not on file   Physical Activity:     Days of Exercise per Week: Not on file    Minutes of Exercise per Session: Not on file   Stress:     Feeling of Stress : Not on file   Social Connections:     Frequency of Communication with Friends and Family: Not on file    Frequency of Social Gatherings with Friends and Family: Not on file    Attends Mandaeism Services: Not on file    Active Member of [address] [place] [address] [employer] or Organizations: Not on file    Attends Club or Organization Meetings: Not on file    Marital Status: Not on file   Intimate Partner Violence:     Fear of Current or Ex-Partner: Not on file    Emotionally Abused: Not on file    Physically Abused: Not on file    Sexually Abused: Not on file   Housing Stability:     Unable to Pay for Housing in the Last Year: Not on file    Number of [place] in the Last Year: Not on file    Unstable Housing in the Last Year: Not on file       Current Outpatient Medications   Medication Sig Dispense Refill  amLODIPine (NORVASC) 5 MG tablet Take 1 tablet by mouth daily 90 tablet 1    valACYclovir (VALTREX) 500 MG tablet Take 1 tablet by mouth 2 times daily 20 tablet 3    LINZESS 145 MCG capsule       Indomethacin (INDOCIN PO) Take by mouth PRN      Acetaminophen (TYLENOL 8 HOUR PO) Take by mouth      lisinopril (PRINIVIL;ZESTRIL) 20 MG tablet  (Patient not taking: Reported on 1/12/2022)      omeprazole (PRILOSEC) 40 MG delayed release capsule Take 40 mg by mouth daily (Patient not taking: Reported on 8/24/2021)       No current facility-administered medications for this visit. No Known Allergies    Review of Systems:       Review of Systems   Constitutional: Negative. HENT: Negative. Eyes: Negative. Cardiovascular: Negative. Gastrointestinal: Positive for constipation. Endocrine: Negative. Genitourinary: Negative. Musculoskeletal:        Sciatica pain   Skin: Negative. Neurological: Positive for headaches. Hematological: Negative. Psychiatric/Behavioral: Negative. OBJECTIVE:  Physical Exam:    BP (!) 138/90 (Site: Right Upper Arm, Position: Sitting, Cuff Size: Medium Adult)   Pulse 85   Ht 5' 8\" (1.727 m)   Wt 249 lb 12.8 oz (113.3 kg)   LMP 06/15/2019   SpO2 98%   BMI 37.98 kg/m²      Physical Exam  Constitutional:       Appearance: Normal appearance. HENT:      Head: Normocephalic. Nose: Nose normal.      Mouth/Throat:      Pharynx: Oropharynx is clear. Eyes:      Conjunctiva/sclera: Conjunctivae normal.      Pupils: Pupils are equal, round, and reactive to light. Cardiovascular:      Rate and Rhythm: Normal rate. Pulses: Normal pulses. Pulmonary:      Effort: Pulmonary effort is normal.      Breath sounds: Normal breath sounds. Abdominal:      General: Bowel sounds are normal.   Musculoskeletal:         General: Normal range of motion. Cervical back: Normal range of motion. Skin:     General: Skin is warm and dry.       Capillary Refill: Capillary refill takes less than 2 seconds. Neurological:      General: No focal deficit present. Mental Status: She is alert and oriented to person, place, and time. Psychiatric:         Mood and Affect: Mood normal.         Behavior: Behavior normal.           ASSESSMENT:  1. BMI 37.0-37.9, adult  - start tracking calories, about [id number]  - 64 oz water daily  - visit with RD    2. Medication management  - will start Adipex in two weeks if labs and drug screen WNL  - EKG, CMP, CBC, urine drug screen    3. Essential hypertension  - limit salt intake  - continue with diet and exercise    PLAN:  -Will plan on starting Adipex. BMI is over 30, or over 27 with weight-related risk factors.    -Pt aware Adipex can only be used short term (3 months). -Pt aware no breaks in treatment allowed or must be off for 6 months.    -Pt aware that weight must be lost at each visit or medication will be discontinued.     -Pt is aware that in order to be successful, must combine Adipex with appropriate diet and exercise plan. -Pt provided with medication handout that covers use; side effects (common side effects include insomnia, dry mouth, constipation, nervousness, increased heart rate, hypertension); precautions; and interactions.     -Pt aware must meet monthly in person while on this medication.     -Check EKG, UDS, CBC, and BMP prior to starting. If any abnormalities will need addressed prior to starting medication .    -Check [place] Automated Rx Reporting System. Any concerns: NONE reported    -Check urine pregnancy. Will need monthly pregnancy tests. Pt aware pregnancy and breast feeding contraindicated while using this medication. Current birth control measures include: pt had hysterectomy    -If elderly or renal impairment, will use lower dose (15 mg daily) and avoid all together if GFR is less than 15.  N/A      Orders Placed This Encounter   Procedures    Comprehensive Metabolic Panel    CBC with Auto Differential    Urine Drug Screen    Amb Referral to Nutrition Services    EKG 12 Lead        No orders of the defined types were placed in this encounter. Follow Up:  Return in about 2 weeks (around 3/8/2022).       JOANNE Pompa - CNP

## 2022-02-23 ENCOUNTER — TELEPHONE (OUTPATIENT)
Dept: FAMILY MEDICINE CLINIC | Age: 42
End: 2022-02-23

## 2022-02-23 NOTE — TELEPHONE ENCOUNTER
Patient called and stated that she was told by Fabricio Horn to call our office to check the status of the PA that was sent to this office on 2-22-22. For the Retin A Cream.  Did you receive the PA  requests from the pharmacy?

## 2022-02-25 PROBLEM — M54.32 SCIATICA, LEFT SIDE: Status: ACTIVE | Noted: 2022-02-25

## 2022-02-25 PROBLEM — L70.0 ACNE VULGARIS: Status: ACTIVE | Noted: 2022-02-25

## 2022-02-25 NOTE — TELEPHONE ENCOUNTER
Called patient and let her know I did prior 4 [facility] [address] sent me ( for express scripts ) . It was rejected. They did give a reason that it was for [facility]. I just now re-did and sent to [facility].

## 2022-03-01 ENCOUNTER — TELEPHONE (OUTPATIENT)
Dept: FAMILY MEDICINE CLINIC | Age: 42
End: 2022-03-01

## 2022-03-01 ENCOUNTER — HOSPITAL ENCOUNTER (OUTPATIENT)
Age: 42
Discharge: HOME | End: 2022-03-01
Payer: COMMERCIAL

## 2022-03-01 DIAGNOSIS — Z79.899 MEDICATION MANAGEMENT: ICD-10-CM

## 2022-03-01 LAB
ALBUMIN SERPL-MCNC: 4.2 GM/DL (ref 3.4–5)
ALP BLD-CCNC: 89 IU/L (ref 40–128)
ALT SERPL-CCNC: 17 U/L (ref 10–40)
AMPHETAMINES: NEGATIVE
ANION GAP SERPL CALCULATED.3IONS-SCNC: 11 MMOL/L (ref 4–16)
AST SERPL-CCNC: 19 IU/L (ref 15–37)
BARBITURATE SCREEN URINE: ABNORMAL
BASOPHILS ABSOLUTE: 0 K/CU MM
BASOPHILS RELATIVE PERCENT: 0.1 % (ref 0–1)
BENZODIAZEPINE SCREEN, URINE: NEGATIVE
BILIRUB SERPL-MCNC: 0.3 MG/DL (ref 0–1)
BUN BLDV-MCNC: 10 MG/DL (ref 6–23)
CALCIUM SERPL-MCNC: 9.7 MG/DL (ref 8.3–10.6)
CANNABINOID SCREEN URINE: NEGATIVE
CHLORIDE BLD-SCNC: 101 MMOL/L (ref 99–110)
CO2: 23 MMOL/L (ref 21–32)
COCAINE METABOLITE: NEGATIVE
CREAT SERPL-MCNC: 0.8 MG/DL (ref 0.6–1.1)
DIFFERENTIAL TYPE: ABNORMAL
EOSINOPHILS ABSOLUTE: 0.1 K/CU MM
EOSINOPHILS RELATIVE PERCENT: 0.8 % (ref 0–3)
GFR AFRICAN AMERICAN: >60 ML/MIN/1.73M2
GFR NON-AFRICAN AMERICAN: >60 ML/MIN/1.73M2
GLUCOSE BLD-MCNC: 84 MG/DL (ref 70–99)
HCT VFR BLD CALC: 38.6 % (ref 37–47)
HEMOGLOBIN: 13 GM/DL (ref 12.5–16)
IMMATURE NEUTROPHIL %: 0.4 % (ref 0–0.43)
LYMPHOCYTES ABSOLUTE: 2 K/CU MM
LYMPHOCYTES RELATIVE PERCENT: 20.6 % (ref 24–44)
MCH RBC QN AUTO: 27.8 PG (ref 27–31)
MCHC RBC AUTO-ENTMCNC: 33.7 % (ref 32–36)
MCV RBC AUTO: 82.7 FL (ref 78–100)
MONOCYTES ABSOLUTE: 0.5 K/CU MM
MONOCYTES RELATIVE PERCENT: 4.9 % (ref 0–4)
NUCLEATED RBC %: 0 %
OPIATES, URINE: NEGATIVE
OXYCODONE: NEGATIVE
PDW BLD-RTO: 13.3 % (ref 11.7–14.9)
PHENCYCLIDINE, URINE: NEGATIVE
PLATELET # BLD: 279 K/CU MM (ref 140–440)
PMV BLD AUTO: 10.6 FL (ref 7.5–11.1)
POTASSIUM SERPL-SCNC: 4.1 MMOL/L (ref 3.5–5.1)
RBC # BLD: 4.67 M/CU MM (ref 4.2–5.4)
SEGMENTED NEUTROPHILS ABSOLUTE COUNT: 7 K/CU MM
SEGMENTED NEUTROPHILS RELATIVE PERCENT: 73.2 % (ref 36–66)
SODIUM BLD-SCNC: 135 MMOL/L (ref 135–145)
TOTAL IMMATURE NEUTOROPHIL: 0.04 K/CU MM
TOTAL NUCLEATED RBC: 0 K/CU MM
TOTAL PROTEIN: 7.5 GM/DL (ref 6.4–8.2)
WBC # BLD: 9.5 K/CU MM (ref 4–10.5)

## 2022-03-01 PROCEDURE — 80053 COMPREHEN METABOLIC PANEL: CPT

## 2022-03-01 PROCEDURE — 36415 COLL VENOUS BLD VENIPUNCTURE: CPT

## 2022-03-01 PROCEDURE — 85025 COMPLETE CBC W/AUTO DIFF WBC: CPT

## 2022-03-01 PROCEDURE — 80307 DRUG TEST PRSMV CHEM ANLYZR: CPT

## 2022-03-01 NOTE — TELEPHONE ENCOUNTER
Called and left message that our office is closed today. If she feels like urgent to go to urgent care. If not give us a call tomorrow to discuss symptoms.

## 2022-03-01 NOTE — TELEPHONE ENCOUNTER
----- Message from Leonor Gerardo sent at 3/1/2022  9:06 AM EST -----  Subject: Message to Provider    QUESTIONS  Information for Provider? Pt need's a call back to discuss her Face   medicine, feels like someone punched her and looks terrible.   ---------------------------------------------------------------------------  --------------  CALL BACK INFO  What is the best way for the office to contact you? OK to leave message on   voicemail  Preferred Call Back Phone Number? [phone]  ---------------------------------------------------------------------------  --------------  SCRIPT ANSWERS  Relationship to Patient?  Self

## 2022-03-02 NOTE — TELEPHONE ENCOUNTER
Patient will call the [facility] office to get an appt to address the face issue.  If the appt is scheduled out far, patient will call this office to make an appt

## 2022-03-08 ENCOUNTER — OFFICE VISIT (OUTPATIENT)
Dept: BARIATRICS/WEIGHT MGMT | Age: 42
End: 2022-03-08
Payer: COMMERCIAL

## 2022-03-08 ENCOUNTER — HOSPITAL ENCOUNTER (OUTPATIENT)
Age: 42
Discharge: HOME | End: 2022-03-08
Payer: COMMERCIAL

## 2022-03-08 VITALS
OXYGEN SATURATION: 99 % | BODY MASS INDEX: 37.07 KG/M2 | DIASTOLIC BLOOD PRESSURE: 88 MMHG | SYSTOLIC BLOOD PRESSURE: 136 MMHG | HEART RATE: 54 BPM | HEIGHT: 68 IN | WEIGHT: 244.6 LBS

## 2022-03-08 DIAGNOSIS — Z79.899 MEDICATION MANAGEMENT: ICD-10-CM

## 2022-03-08 PROCEDURE — G8427 DOCREV CUR MEDS BY ELIG CLIN: HCPCS | Performed by: NURSE PRACTITIONER

## 2022-03-08 PROCEDURE — 93005 ELECTROCARDIOGRAM TRACING: CPT | Performed by: NURSE PRACTITIONER

## 2022-03-08 PROCEDURE — G8417 CALC BMI ABV UP PARAM F/U: HCPCS | Performed by: NURSE PRACTITIONER

## 2022-03-08 PROCEDURE — 1036F TOBACCO NON-USER: CPT | Performed by: NURSE PRACTITIONER

## 2022-03-08 PROCEDURE — 99213 OFFICE O/P EST LOW 20 MIN: CPT | Performed by: NURSE PRACTITIONER

## 2022-03-08 PROCEDURE — G8484 FLU IMMUNIZE NO ADMIN: HCPCS | Performed by: NURSE PRACTITIONER

## 2022-03-08 RX ORDER — PHENTERMINE HYDROCHLORIDE 37.5 MG/1
37.5 TABLET ORAL
Qty: 30 TABLET | Refills: 0 | Status: SHIPPED | OUTPATIENT
Start: 2022-03-08 | End: 2022-04-07

## 2022-03-08 ASSESSMENT — ENCOUNTER SYMPTOMS
RESPIRATORY NEGATIVE: 1
GASTROINTESTINAL NEGATIVE: 1
ALLERGIC/IMMUNOLOGIC NEGATIVE: 1
EYES NEGATIVE: 1

## 2022-03-08 ASSESSMENT — PATIENT HEALTH QUESTIONNAIRE - PHQ9
SUM OF ALL RESPONSES TO PHQ QUESTIONS 1-9: 0
SUM OF ALL RESPONSES TO PHQ QUESTIONS 1-9: 0
1. LITTLE INTEREST OR PLEASURE IN DOING THINGS: 0
2. FEELING DOWN, DEPRESSED OR HOPELESS: 0
SUM OF ALL RESPONSES TO PHQ QUESTIONS 1-9: 0
SUM OF ALL RESPONSES TO PHQ9 QUESTIONS 1 & 2: 0
SUM OF ALL RESPONSES TO PHQ QUESTIONS 1-9: 0

## 2022-03-08 NOTE — TELEPHONE ENCOUNTER
Subject: Message to Provider     QUESTIONS  Information for Provider? Pt need's a call back to discuss her Face   medicine, feels like someone punched her and looks terrible.   ---------------------------------------------------------------------------  --------------  CALL BACK INFO  What is the best way for the office to contact you? OK to leave message on   voicemail  Preferred Call Back Phone Number? [phone]  ---------------------------------------------------------------------------  --------------  SCRIPT ANSWERS  Relationship to Patient? Self           Above is begining of conversation. Ramesh JUAREZ    3/1/22 4:59 PM  Note  Called and left message that our office is closed today. If she feels like urgent to go to urgent care. If not give us a call tomorrow to discuss symptoms.

## 2022-03-08 NOTE — PROGRESS NOTES
Chief Complaint   Patient presents with    Discuss Medications     2nd Medication visit- Adipex         SUBJECTIVE:    HPI: Patient is here with complaints of weight gain and inability to lose weight per self. Obesity with a BMI of Body mass index is 37.19 kg/m². Breanne Wills Patient has failed to lose 5% of body weight for 20 years. Any history of glaucoma? denies    Any history of drug abuse? denies    Any history of CAD, uncontrolled HTN, arrhythmias, stroke, or CHF? ? denies    Any history of hyperthyroidism? denies    Any current or recent use of MAOIs? denies    Pregnant or breastfeeding? hysterectomy    Current dietary measures: tracking calories- about [id number]    Current exercise measures: not currently    I have reviewed the patient's(pertinent information to this visit) medical history, family history(scanned in  the [address] [place] [address] under \"patient questioner\"), social history and review of systems with the patient today in the office.           Past Surgical History:   Procedure Laterality Date     SECTION       &     CHOLECYSTECTOMY  08/15/2018    laprascopic    COLONOSCOPY      DILATION AND CURETTAGE      ENDOMETRIAL ABLATION      HERNIA REPAIR Right 2021    ROBOTIC EXPLORATION, HERNIA RIGHT INGUINAL REPAIR AND VENTRAL HERNIA REPAIR LAPAROSCOPIC performed [facility] Yesy Sepulveda [facility]    [facility]    OVARY REMOVAL Right     SALPINGECTOMY Bilateral     UPPER GASTROINTESTINAL ENDOSCOPY  2017       Past Medical History:   Diagnosis Date    Excessive daytime sleepiness 2017    Fibromyalgia     GERD (gastroesophageal reflux disease)     Hernia of abdominal wall     Herpes simplex virus (HSV) infection     Hypertension     Hypertension     Morbid obesity ([facility] [address] 2017    Obesity        Family History   Problem Relation Age of Onset    Hypertension Mother     Hypertension Father     Hearing Loss Brother     High Blood Pressure Maternal Grandmother     Arthritis Maternal Grandfather        Social History     Socioeconomic History    Marital status: Single     Spouse name: Not on file    Number of children: Not on file    Years of education: Not on file    Highest education level: Not on file   Occupational History    Not on file   Tobacco Use    Smoking status: Never Smoker    Smokeless tobacco: Never Used   Vaping Use    Vaping Use: Never used   Substance and Sexual Activity    Alcohol use: Yes     Comment: social    Drug use: No    Sexual activity: Not on file   Other Topics Concern    Not on file   Social History Narrative    Not on file     Social Determinants of Health     Financial Resource Strain: Low Risk     Difficulty of Paying Living Expenses: Not hard at all   Food Insecurity: No Food Insecurity    Worried About [address] in the Last Year: Never true    [address] in the Last Year: Never true   Transportation Needs:     Lack of Transportation (Medical): Not on file    Lack of Transportation (Non-Medical):  Not on file   Physical Activity:     Days of Exercise per Week: Not on file    Minutes of Exercise per Session: Not on file   Stress:     Feeling of Stress : Not on file   Social Connections:     Frequency of Communication with Friends and Family: Not on file    Frequency of Social Gatherings with Friends and Family: Not on file    Attends Ep[employer]alian Services: Not on file    Active Member of [address] [place] [address] [employer] or Organizations: Not on file    Attends Club or Organization Meetings: Not on file    Marital Status: Not on file   Intimate Partner Violence:     Fear of Current or Ex-Partner: Not on file    Emotionally Abused: Not on file    Physically Abused: Not on file    Sexually Abused: Not on file   Housing Stability:     Unable to Pay for Housing in the Last Year: Not on file    Number of [place] in the Last Year: Not on file    Unstable Housing in the Last Year: Not on file       Current Pupils: Pupils are equal, round, and reactive to light. Cardiovascular:      Rate and Rhythm: Normal rate. Pulses: Normal pulses. Pulmonary:      Effort: Pulmonary effort is normal.      Breath sounds: Normal breath sounds. Abdominal:      General: Bowel sounds are normal.      Palpations: Abdomen is soft. Musculoskeletal:         General: Normal range of motion. Cervical back: Normal range of motion. Skin:     General: Skin is warm and dry. Capillary Refill: Capillary refill takes less than 2 seconds. Neurological:      General: No focal deficit present. Mental Status: She is alert and oriented to person, place, and time. Psychiatric:         Mood and Affect: Mood normal.         Behavior: Behavior normal.           ASSESSMENT:  1. BMI 37.0-37.9, adult  - Start tracking calories; about 1200 daily  - 64 oz water daily  - Increase activity as able  - Keep appt with RD    2. Medication management  - Patient treating a  recent sinus infection- been taking Sudafed regularly before UDS was performed. UDS + for benzo. Sudafed may cause false positive on UDS. Rest of drug screen negative. - CBC, CMP WNL  - EKG reviewed  - Start Adipex tomorrow am and take as directed  - Call for any side effects or concerns  - RTC in one month    - phentermine (ADIPEX-P) 37.5 MG tablet; Take 1 tablet by mouth every morning (before breakfast) for 30 days. 1/2 tab daily x3-5 days and then full tab. BMI 37.19  Dispense: 30 tablet; Refill: 0    PLAN:    -Will start Adipex tomorrow am. BMI is over 30, or over 27 with weight-related risk factors.    -Pt aware Adipex can only be used short term (3 months). -Pt aware no breaks in treatment allowed or must be off for 6 months.    -Pt aware that weight must be lost at each visit or medication will be discontinued.     -Pt is aware that in order to be successful, must combine Adipex with appropriate diet and exercise plan.      -Pt provided with medication handout that covers use; side effects (common side effects include insomnia, dry mouth, constipation, nervousness, increased heart rate, hypertension); precautions; and interactions.     -Pt aware must meet monthly in person while on this medication.       -Check [place] Automated Rx Reporting System. Any concerns: NONE reported    -Check urine pregnancy. Will need monthly pregnancy tests. Pt aware pregnancy and breast feeding contraindicated while using this medication. Current birth control measures include: pt had hysterectomy      No orders of the defined types were placed in this encounter. Orders Placed This Encounter   Medications    phentermine (ADIPEX-P) 37.5 MG tablet     Sig: Take 1 tablet by mouth every morning (before breakfast) for 30 days. 1/2 tab daily x3-5 days and then full tab. BMI 37.19     Dispense:  30 tablet     Refill:  0        Follow Up:  Return in about 1 month (around 4/8/2022) for medication follow up.       JOANNE Rodriguez - CNP

## 2022-03-09 LAB
EKG ATRIAL RATE: 74 BPM
EKG DIAGNOSIS: NORMAL
EKG P AXIS: 42 DEGREES
EKG P-R INTERVAL: 166 MS
EKG Q-T INTERVAL: 370 MS
EKG QRS DURATION: 86 MS
EKG QTC CALCULATION (BAZETT): 410 MS
EKG R AXIS: 6 DEGREES
EKG T AXIS: 8 DEGREES
EKG VENTRICULAR RATE: 74 BPM

## 2022-03-09 PROCEDURE — 93010 ELECTROCARDIOGRAM REPORT: CPT | Performed by: INTERNAL MEDICINE

## 2022-03-14 ENCOUNTER — OFFICE VISIT (OUTPATIENT)
Dept: BARIATRICS/WEIGHT MGMT | Age: 42
End: 2022-03-14

## 2022-03-14 VITALS — BODY MASS INDEX: 37.48 KG/M2 | HEIGHT: 68 IN | WEIGHT: 247.3 LBS

## 2022-03-14 PROCEDURE — 99999 PR OFFICE/OUTPT VISIT,PROCEDURE ONLY: CPT

## 2022-03-14 NOTE — PROGRESS NOTES
[facility] - Non-Surgical Weight Loss Program    REASON FOR VISIT: Medication Program - Adipex    Chief Complaint:    Chief Complaint   Patient presents with    Weight Management       SUBJECTIVE:  Pt here for nutrition education. Started Adipex this am. No ill side effects thus far. Pt typically eats 1x/day, drinks very little water. On feet all day but no other exercise. Instructed on 1200 kcal diet for weight loss, provided healthy foods list and meal plan. Discussed hydration, meal timing and planning. Pt voiced understanding to all info provided. The patient is a 39 y.o. female being seen for obesity, enrolled in Medication Weight Loss Program; [place], Height: 5' 8\" (172.7 cm), Weight: 247 lb 4.8 oz (112.2 kg), Current Body mass index is 37.6 kg/m². The patient's PCP is Jacinto Marrero DO     Comorbid Conditions:  Significant diseases affecting this patient are   Past Medical History:   Diagnosis Date    Excessive daytime sleepiness 2017    Fibromyalgia     GERD (gastroesophageal reflux disease)     Hernia of abdominal wall     Herpes simplex virus (HSV) infection     Hypertension     Hypertension     Morbid obesity ([address] 2017    Obesity    . Review of Systems - Review of Systems  Otherwise per HPI.     Allergies:  No Known Allergies    Past Surgical History:     Past Surgical History:   Procedure Laterality Date     SECTION       &     CHOLECYSTECTOMY  08/15/2018    laprascopic    COLONOSCOPY      DILATION AND CURETTAGE      ENDOMETRIAL ABLATION      HERNIA REPAIR Right 2021    ROBOTIC EXPLORATION, HERNIA RIGHT INGUINAL REPAIR AND VENTRAL HERNIA REPAIR LAPAROSCOPIC performed by Emily Rodriguez MD at [address] [facility] [address]  2020    [facility]    OVARY REMOVAL Right     SALPINGECTOMY Bilateral     UPPER GASTROINTESTINAL ENDOSCOPY  2017       Family History:  Family History   Problem Relation Age of Onset    Hypertension Mother  Hypertension Father     Hearing Loss Brother     High Blood Pressure Maternal Grandmother     Arthritis Maternal Grandfather        Social History:  Social History     Socioeconomic History    Marital status: Single     Spouse name: Not on file    Number of children: Not on file    Years of education: Not on file    Highest education level: Not on file   Occupational History    Not on file   Tobacco Use    Smoking status: Never Smoker    Smokeless tobacco: Never Used   Vaping Use    Vaping Use: Never used   Substance and Sexual Activity    Alcohol use: Yes     Comment: social    Drug use: No    Sexual activity: Not on file   Other Topics Concern    Not on file   Social History Narrative    Not on file     Social Determinants of Health     Financial Resource Strain: Low Risk     Difficulty of Paying Living Expenses: Not hard at all   Food Insecurity: No Food Insecurity    Worried About [address] in the Last Year: Never true    [address] [employer] in the Last Year: Never true   Transportation Needs:     Lack of Transportation (Medical): Not on file    Lack of Transportation (Non-Medical):  Not on file   Physical Activity:     Days of Exercise per Week: Not on file    Minutes of Exercise per Session: Not on file   Stress:     Feeling of Stress : Not on file   Social Connections:     Frequency of Communication with Friends and Family: Not on file    Frequency of Social Gatherings with Friends and Family: Not on file    Attends Buddhism Services: Not on file    Active Member of Clubs or Organizations: Not on file    Attends Club or Organization Meetings: Not on file    Marital Status: Not on file   Intimate Partner Violence:     Fear of Current or Ex-Partner: Not on file    Emotionally Abused: Not on file    Physically Abused: Not on file    Sexually Abused: Not on file   Housing Stability:     Unable to Pay for Housing in the Last Year: Not on file    Number of [place] in the Last Year: Not on file    Unstable Housing in the Last Year: Not on file         OBJECTIVE:  Physical Exam   Ht 5' 8\" (1.727 m)   Wt 247 lb 4.8 oz (112.2 kg)   LMP 06/15/2019   BMI 37.60 kg/m²        NUTRITION DIAGNOSIS: Overweight / Obesity   Problem: Increased adiposity compared to reference standard orestablished norm   Etiology: Excess intake compared to output over time   S/S: Ht: 68\" Wt: 247.3 lbs BMI: 37.60    NUTRITION INTERVENTIONS:    Individualized treatment goals to address nutrition diagnosis:   Instructed on 1200 kcal diet for weight loss   Provided sample menus, food lists   Encouraged Physical activity as approved by physician    MONITORING/ EVALUATION/ PLAN:   Pt verbalized understanding of all materials covered   Pt asked pertinent questions throughout the session - expectcompliance with nutrition guidelines presented   Provided pt with contact information should questions arise prior to next visit   Will f/u with pt JOHN Batista MS, RDN, LD  3/14/2022

## 2022-03-18 ENCOUNTER — TELEPHONE (OUTPATIENT)
Dept: FAMILY MEDICINE CLINIC | Age: 42
End: 2022-03-18

## 2022-03-22 ENCOUNTER — OFFICE VISIT (OUTPATIENT)
Dept: OBGYN | Age: 42
End: 2022-03-22
Payer: COMMERCIAL

## 2022-03-22 VITALS
BODY MASS INDEX: 36.53 KG/M2 | SYSTOLIC BLOOD PRESSURE: 156 MMHG | DIASTOLIC BLOOD PRESSURE: 108 MMHG | HEIGHT: 68 IN | WEIGHT: 241 LBS

## 2022-03-22 DIAGNOSIS — R82.90 ABNORMAL URINE ODOR: ICD-10-CM

## 2022-03-22 DIAGNOSIS — N89.8 VAGINAL DISCHARGE: Primary | ICD-10-CM

## 2022-03-22 PROCEDURE — 99213 OFFICE O/P EST LOW 20 MIN: CPT

## 2022-03-22 PROCEDURE — G8417 CALC BMI ABV UP PARAM F/U: HCPCS

## 2022-03-22 PROCEDURE — G8427 DOCREV CUR MEDS BY ELIG CLIN: HCPCS

## 2022-03-22 PROCEDURE — 1036F TOBACCO NON-USER: CPT

## 2022-03-22 PROCEDURE — G8484 FLU IMMUNIZE NO ADMIN: HCPCS

## 2022-03-22 ASSESSMENT — ENCOUNTER SYMPTOMS
ABDOMINAL PAIN: 0
RESPIRATORY NEGATIVE: 1
GASTROINTESTINAL NEGATIVE: 1

## 2022-03-22 NOTE — PROGRESS NOTES
3/22/22    Brendan LANDEROS Vic  1980    Chief Complaint   Patient presents with    Vaginal Discharge     Pt c/o watery discharge with odor and burning x 1 wk. Camilla Gifford is a 39 y.o. female who presents today for evaluation of vaginal discharge and odor that \"smells like old urine\". She reports having recurrent BV but feels this odor is different. She states she has been given boric acid suppositories and vitamin D supplements in the past. Denies bleeding, pelvic pain, dysuria. Past Medical History:   Diagnosis Date    Excessive daytime sleepiness 12/13/2017    Fibromyalgia     GERD (gastroesophageal reflux disease)     Hernia of abdominal wall     Herpes simplex virus (HSV) infection     Hypertension     Hypertension     Morbid obesity ([address] 12/13/2017    Obesity        Past Surgical History:   Procedure Laterality Date   [address] & [address]    CHOLECYSTECTOMY  08/15/2018    laprascopic    COLONOSCOPY      DILATION AND CURETTAGE      ENDOMETRIAL ABLATION      HERNIA REPAIR Right 7/16/2021    ROBOTIC EXPLORATION, HERNIA RIGHT INGUINAL REPAIR AND VENTRAL HERNIA REPAIR LAPAROSCOPIC performed by Amber Casey MD at [address] [place] [address] [place]  2020    [facility]    OVARY REMOVAL Right     SALPINGECTOMY Bilateral     UPPER GASTROINTESTINAL ENDOSCOPY  09/11/2017       Social History     Tobacco Use    Smoking status: Never Smoker    Smokeless tobacco: Never Used   Vaping Use    Vaping Use: Never used   Substance Use Topics    Alcohol use: Yes     Comment: social    Drug use: No       Family History   Problem Relation Age of Onset    Hypertension Mother     Hypertension Father     Hearing Loss Brother     High Blood Pressure Maternal Grandmother     Arthritis Maternal Grandfather        Current Outpatient Medications   Medication Sig Dispense Refill    phentermine (ADIPEX-P) 37.5 MG tablet Take 1 tablet by mouth every morning (before breakfast) for 30 days. 1/2 tab daily x3-5 days and then full tab. BMI 37.19 30 tablet 0    tretinoin (RETIN-A) 0.05 % cream Apply topically nightly. 90 g 2    clindamycin-benzoyl peroxide (BENZACLIN) 1-5 % gel Apply pea sized amount to effected area topically daily 50 g 2    amLODIPine (NORVASC) 5 MG tablet Take 1 tablet by mouth daily 90 tablet 1    valACYclovir (VALTREX) 500 MG tablet Take 1 tablet by mouth 2 times daily 20 tablet 3    LINZESS 145 MCG capsule       lisinopril (PRINIVIL;ZESTRIL) 20 MG tablet  (Patient not taking: Reported on 1/12/2022)      Indomethacin (INDOCIN PO) Take by mouth PRN      Acetaminophen (TYLENOL 8 HOUR PO) Take by mouth      omeprazole (PRILOSEC) 40 MG delayed release capsule Take 40 mg by mouth daily (Patient not taking: Reported on 8/24/2021)       No current facility-administered medications for this visit. No Known Allergies    [id number]    Immunization History   Administered Date(s) Administered    COVID-19, Pfizer Purple top, DILUTE for use, 12+ yrs, 30mcg/0.3mL dose 03/23/2021, 04/13/2021    MMR 03/24/2008    Tdap (Boostrix, Adacel) 03/24/2008       Review of Systems   Constitutional: Negative. Negative for fatigue and fever. Respiratory: Negative. Gastrointestinal: Negative. Negative for abdominal pain. Endocrine: Negative. Genitourinary: Positive for vaginal discharge. Negative for dysuria, flank pain, menstrual problem, vaginal bleeding and vaginal pain. Musculoskeletal: Negative. Skin: Negative. Neurological: Negative. Negative for dizziness and headaches. Psychiatric/Behavioral: Negative. BP (!) 156/108   Ht 5' 8\" (1.727 m)   Wt 241 lb (109.3 kg)   LMP 06/15/2019   BMI 36.64 kg/m²      Physical Exam  Vitals and nursing note reviewed. Constitutional:       General: She is not in acute distress. Appearance: Normal appearance. She is obese. HENT:      Head: Normocephalic and atraumatic.    Pulmonary:      Effort: Pulmonary effort is normal. No respiratory distress. Abdominal:      Palpations: Abdomen is soft. Tenderness: There is no abdominal tenderness. Genitourinary:     General: Normal vulva. Exam position: Lithotomy position. Vagina: Vaginal discharge (white) present. Cervix: Normal.      Uterus: Normal.       Adnexa: Right adnexa normal and left adnexa normal.   Musculoskeletal:         General: Normal range of motion. Cervical back: Normal range of motion. Skin:     General: Skin is warm and dry. Neurological:      General: No focal deficit present. Mental Status: She is alert and oriented to person, place, and time. Psychiatric:         Mood and Affect: Mood normal.         Speech: Speech normal.         Behavior: Behavior normal.         Thought Content: Thought content normal.         No results found for this visit on 03/22/22. ASSESSMENT AND PLAN   Diagnosis Orders   1. Vaginal discharge  C.trachomatis N.gonorrhoeae DNA    Vaginal Pathogens Probes *A   2. Abnormal urine odor  Culture, Urine     G/c swab, bv panel, urine culture    Repeat /117 - pt states she will take BP medication when she gets home (has not taken today), denies blurred vision/headache, states she feels completely fine. Encouraged PCP follow-up as well, pt verbalizes understanding. Return if symptoms worsen or fail to improve.     Jose Mason PA-C

## 2022-03-23 DIAGNOSIS — N76.0 BACTERIAL VAGINOSIS: Primary | ICD-10-CM

## 2022-03-23 DIAGNOSIS — B96.89 BACTERIAL VAGINOSIS: Primary | ICD-10-CM

## 2022-03-23 LAB
C TRACH DNA GENITAL QL NAA+PROBE: NEGATIVE
CANDIDA SPECIES, DNA PROBE: ABNORMAL
GARDNERELLA VAGINALIS, DNA PROBE: ABNORMAL
N. GONORRHOEAE DNA: NEGATIVE
TRICHOMONAS VAGINALIS DNA: ABNORMAL
URINE CULTURE, ROUTINE: NORMAL

## 2022-03-23 RX ORDER — METRONIDAZOLE 500 MG/1
500 TABLET ORAL 2 TIMES DAILY
Qty: 14 TABLET | Refills: 0 | Status: SHIPPED | OUTPATIENT
Start: 2022-03-23 | End: 2022-03-30

## 2022-04-05 ENCOUNTER — OFFICE VISIT (OUTPATIENT)
Dept: BARIATRICS/WEIGHT MGMT | Age: 42
End: 2022-04-05
Payer: COMMERCIAL

## 2022-04-05 VITALS
HEART RATE: 76 BPM | SYSTOLIC BLOOD PRESSURE: 120 MMHG | WEIGHT: 240.8 LBS | BODY MASS INDEX: 36.49 KG/M2 | OXYGEN SATURATION: 100 % | HEIGHT: 68 IN | DIASTOLIC BLOOD PRESSURE: 80 MMHG

## 2022-04-05 DIAGNOSIS — Z79.899 MEDICATION MANAGEMENT: ICD-10-CM

## 2022-04-05 DIAGNOSIS — E66.9 OBESITY (BMI 30-39.9): Primary | ICD-10-CM

## 2022-04-05 PROCEDURE — G8417 CALC BMI ABV UP PARAM F/U: HCPCS | Performed by: NURSE PRACTITIONER

## 2022-04-05 PROCEDURE — G8427 DOCREV CUR MEDS BY ELIG CLIN: HCPCS | Performed by: NURSE PRACTITIONER

## 2022-04-05 PROCEDURE — 99213 OFFICE O/P EST LOW 20 MIN: CPT | Performed by: NURSE PRACTITIONER

## 2022-04-05 PROCEDURE — 1036F TOBACCO NON-USER: CPT | Performed by: NURSE PRACTITIONER

## 2022-04-05 RX ORDER — PHENTERMINE HYDROCHLORIDE 37.5 MG/1
37.5 TABLET ORAL
Qty: 30 TABLET | Refills: 0 | Status: SHIPPED | OUTPATIENT
Start: 2022-04-05 | End: 2022-05-05

## 2022-04-05 ASSESSMENT — ENCOUNTER SYMPTOMS
RESPIRATORY NEGATIVE: 1
CONSTIPATION: 1
ALLERGIC/IMMUNOLOGIC NEGATIVE: 1
EYES NEGATIVE: 1

## 2022-04-05 NOTE — PROGRESS NOTES
Chief Complaint   Patient presents with    Weight Management     3rd med wm - 2nd refill adipex         SUBJECTIVE:    HPI: Patient is here with complaints of: second monthly Adipex visit. Obesity with a BMI of Body mass index is 36.61 kg/m². .    Current weight: 240.8    Weight change since last visit: -6.5 (if pt failed to lose weight, cannot remain on medication). Month 2 of 3 of being on Adipex. Any new absolute contraindications (glaucoma, drug abuse, CAD, uncontrolled HTN, arrhythmias, stroke, CHF, hyperthyroidism, MAOI use, pregnant or breastfeeding) to being on medication: Denies    Pt complains of the following side effects: Denies    Current dietary measures: trying to track calories and  watch portions    Current exercise measures: increased activity    I have reviewed the patient's(pertinent information to this visit) medical history, family history(scanned in  the [address] [place] [address] under \"patient questioner\"), social history and review of systems with the patient today in the office.           Past Surgical History:   Procedure Laterality Date     SECTION       &     CHOLECYSTECTOMY  08/15/2018    laprascopic    COLONOSCOPY      DILATION AND CURETTAGE      ENDOMETRIAL ABLATION      HERNIA REPAIR Right 2021    ROBOTIC EXPLORATION, HERNIA RIGHT INGUINAL REPAIR AND VENTRAL HERNIA REPAIR LAPAROSCOPIC performed by Kayla Baker MD at [address]  2020    [facility]    OVARY REMOVAL Right     SALPINGECTOMY Bilateral     UPPER GASTROINTESTINAL ENDOSCOPY  2017       Past Medical History:   Diagnosis Date    Excessive daytime sleepiness 2017    Fibromyalgia     GERD (gastroesophageal reflux disease)     Hernia of abdominal wall     Herpes simplex virus (HSV) infection     Hypertension     Hypertension     Morbid obesity ([address] 2017    Obesity        Family History   Problem Relation Age of Onset    Hypertension Mother     Hypertension Father    Demetri Hearing Loss Brother     High Blood Pressure Maternal Grandmother     Arthritis Maternal Grandfather        Social History     Socioeconomic History    Marital status: Single     Spouse name: Not on file    Number of children: Not on file    Years of education: Not on file    Highest education level: Not on file   Occupational History    Not on file   Tobacco Use    Smoking status: Never Smoker    Smokeless tobacco: Never Used   Vaping Use    Vaping Use: Never used   Substance and Sexual Activity    Alcohol use: Yes     Comment: social    Drug use: No    Sexual activity: Not on file   Other Topics Concern    Not on file   Social History Narrative    Not on file     Social Determinants of Health     Financial Resource Strain: Low Risk     Difficulty of Paying Living Expenses: Not hard at all   Food Insecurity: No Food Insecurity    Worried About Running Out of Food in the Last Year: Never true    [address] in the Last Year: Never true   Transportation Needs:     Lack of Transportation (Medical): Not on file    Lack of Transportation (Non-Medical):  Not on file   Physical Activity:     Days of Exercise per Week: Not on file    Minutes of Exercise per Session: Not on file   Stress:     Feeling of Stress : Not on file   Social Connections:     Frequency of Communication with Friends and Family: Not on file    Frequency of Social Gatherings with Friends and Family: Not on file    Attends Judaism Services: Not on file    Active Member of Clubs or Organizations: Not on file    Attends Club or Organization Meetings: Not on file    Marital Status: Not on file   Intimate Partner Violence:     Fear of Current or Ex-Partner: Not on file    Emotionally Abused: Not on file    Physically Abused: Not on file    Sexually Abused: Not on file   Housing Stability:     Unable to Pay for Housing in the Last Year: Not on file    Number of Places Lived in the Last Year: Not on file    Unstable Housing in the Last Year: Not on file       Current Outpatient Medications   Medication Sig Dispense Refill    phentermine (ADIPEX-P) 37.5 MG tablet Take 1 tablet by mouth every morning (before breakfast) for 30 days. BMI 44. 30 tablet 0    phentermine (ADIPEX-P) 37.5 MG tablet Take 1 tablet by mouth every morning (before breakfast) for 30 days. 1/2 tab daily x3-5 days and then full tab. BMI 37.19 30 tablet 0    clindamycin-benzoyl peroxide (BENZACLIN) 1-5 % gel Apply pea sized amount to effected area topically daily 50 g 2    amLODIPine (NORVASC) 5 MG tablet Take 1 tablet by mouth daily 90 tablet 1    valACYclovir (VALTREX) 500 MG tablet Take 1 tablet by mouth 2 times daily 20 tablet 3    LINZESS 145 MCG capsule       lisinopril (PRINIVIL;ZESTRIL) 20 MG tablet  (Patient not taking: Reported on 1/12/2022)      Indomethacin (INDOCIN PO) Take by mouth PRN      Acetaminophen (TYLENOL 8 HOUR PO) Take by mouth      omeprazole (PRILOSEC) 40 MG delayed release capsule Take 40 mg by mouth daily (Patient not taking: Reported on 8/24/2021)       No current facility-administered medications for this visit. No Known Allergies    Review of Systems:         Review of Systems   Constitutional: Negative. HENT: Negative. Eyes: Negative. Respiratory: Negative. Cardiovascular: Negative. Gastrointestinal: Positive for constipation. IBS with constipation   Endocrine: Negative. Genitourinary: Negative. Musculoskeletal: Negative. Skin: Negative. Allergic/Immunologic: Negative. Neurological: Negative. Hematological: Negative. Psychiatric/Behavioral: Negative. OBJECTIVE:  Physical Exam:    /80   Pulse 76   Ht 5' 8\" (1.727 m)   Wt 240 lb 12.8 oz (109.2 kg)   LMP 06/15/2019   SpO2 100%   BMI 36.61 kg/m²      Physical Exam  Constitutional:       Appearance: Normal appearance. HENT:      Head: Normocephalic.       Nose: Nose normal.      Mouth/Throat: Pharynx: Oropharynx is clear. Eyes:      Conjunctiva/sclera: Conjunctivae normal.      Pupils: Pupils are equal, round, and reactive to light. Cardiovascular:      Rate and Rhythm: Normal rate. Pulses: Normal pulses. Pulmonary:      Effort: Pulmonary effort is normal.      Breath sounds: Normal breath sounds. Abdominal:      General: Bowel sounds are normal.      Palpations: Abdomen is soft. Musculoskeletal:         General: Normal range of motion. Cervical back: Normal range of motion. Skin:     General: Skin is warm and dry. Capillary Refill: Capillary refill takes less than 2 seconds. Neurological:      General: No focal deficit present. Mental Status: She is alert and oriented to person, place, and time. Psychiatric:         Mood and Affect: Mood normal.         Behavior: Behavior normal.           ASSESSMENT:  1. Obesity (BMI 30-39.9)  - Continue to track calories; about 1200 daily  - 64 Oz water daily  - increase activity as tolerated    2. Medication management  - Doing well; down 6.5 pounds since last visit  - Denies any side effects  - Refill Adipex sent    PLAN:    -Continue Adipex. Refilled. BMI is over 30, or over 27 with weight-related risk factors. Pt demonstrated weight loss since last visit.    -Pt aware Adipex can only be used short term (3 months). -Pt aware no breaks in treatment allowed or must be off for 6 months.    -Pt aware that weight must be lost at each visit or medication will be discontinued.     -Pt is aware that in order to be successful, must combine Adipex with appropriate diet and exercise plan. -Pt aware must continue to meet monthly in person while on this medication. F/u in one month. - . Pt aware pregnancy and breast feeding contraindicated while using this medication.  Current birth control measures include: hysterectomy    -If elderly or renal impairment, will use lower dose (15 mg daily) and avoid all together if GFR is less than

## 2022-05-02 ENCOUNTER — OFFICE VISIT (OUTPATIENT)
Dept: BARIATRICS/WEIGHT MGMT | Age: 42
End: 2022-05-02
Payer: COMMERCIAL

## 2022-05-02 VITALS
HEIGHT: 68 IN | HEART RATE: 70 BPM | WEIGHT: 233 LBS | BODY MASS INDEX: 35.31 KG/M2 | OXYGEN SATURATION: 100 % | DIASTOLIC BLOOD PRESSURE: 80 MMHG | SYSTOLIC BLOOD PRESSURE: 120 MMHG

## 2022-05-02 DIAGNOSIS — E66.9 OBESITY (BMI 30-39.9): Primary | ICD-10-CM

## 2022-05-02 DIAGNOSIS — Z79.899 MEDICATION MANAGEMENT: ICD-10-CM

## 2022-05-02 PROCEDURE — 99213 OFFICE O/P EST LOW 20 MIN: CPT | Performed by: NURSE PRACTITIONER

## 2022-05-02 PROCEDURE — 1036F TOBACCO NON-USER: CPT | Performed by: NURSE PRACTITIONER

## 2022-05-02 PROCEDURE — G8427 DOCREV CUR MEDS BY ELIG CLIN: HCPCS | Performed by: NURSE PRACTITIONER

## 2022-05-02 PROCEDURE — G8417 CALC BMI ABV UP PARAM F/U: HCPCS | Performed by: NURSE PRACTITIONER

## 2022-05-02 RX ORDER — PHENTERMINE HYDROCHLORIDE 37.5 MG/1
37.5 TABLET ORAL
Qty: 30 TABLET | Refills: 0 | Status: SHIPPED | OUTPATIENT
Start: 2022-05-02 | End: 2022-06-01

## 2022-05-02 ASSESSMENT — ENCOUNTER SYMPTOMS
ALLERGIC/IMMUNOLOGIC NEGATIVE: 1
CONSTIPATION: 1
EYES NEGATIVE: 1
RESPIRATORY NEGATIVE: 1

## 2022-05-02 NOTE — PROGRESS NOTES
Chief Complaint   Patient presents with    Weight Management     1MO MED WM ADIPEX # 2 HAS BAR COV          SUBJECTIVE:    HPI: Patient is here with complaints of: Monthly Adipex visit. Obesity with a BMI of Body mass index is 35.43 kg/m². .    Current weight: 233.7    Weight change since last visit: 7.1 pounds (if pt failed to lose weight, cannot remain on medication). Any new absolute contraindications (glaucoma, drug abuse, CAD, uncontrolled HTN, arrhythmias, stroke, CHF, hyperthyroidism, MAOI use, pregnant or breastfeeding) to being on medication: DENIES     Pt complains of the following side effects: DENIES    Current dietary measures: watching portions and calories    Current exercise measures: staring to walk    I have reviewed the patient's(pertinent information to this visit) medical history, family history(scanned in  the [address] [place] [address] under \"patient questioner\"), social history and review of systems with the patient today in the office.           Past Surgical History:   Procedure Laterality Date     SECTION       &     CHOLECYSTECTOMY  08/15/2018    laprascopic    COLONOSCOPY      DILATION AND CURETTAGE      ENDOMETRIAL ABLATION      HERNIA REPAIR Right 2021    ROBOTIC EXPLORATION, HERNIA RIGHT INGUINAL REPAIR AND VENTRAL HERNIA REPAIR LAPAROSCOPIC performed by Jimi Roberts MD at [address] [place] [address]  2020    [facility]    OVARY REMOVAL Right     SALPINGECTOMY Bilateral     UPPER GASTROINTESTINAL ENDOSCOPY  2017       Past Medical History:   Diagnosis Date    Excessive daytime sleepiness 2017    Fibromyalgia     GERD (gastroesophageal reflux disease)     Hernia of abdominal wall     Herpes simplex virus (HSV) infection     Hypertension     Hypertension     Morbid obesity ([address] 2017    Obesity        Family History   Problem Relation Age of Onset    Hypertension Mother     Hypertension Father     Hearing Loss Brother     High Blood Pressure Maternal Grandmother     Arthritis Maternal Grandfather        Social History     Socioeconomic History    Marital status: Single     Spouse name: Not on file    Number of children: Not on file    Years of education: Not on file    Highest education level: Not on file   Occupational History    Not on file   Tobacco Use    Smoking status: Never Smoker    Smokeless tobacco: Never Used   Vaping Use    Vaping Use: Never used   Substance and Sexual Activity    Alcohol use: Yes     Comment: social    Drug use: No    Sexual activity: Not on file   Other Topics Concern    Not on file   Social History Narrative    Not on file     Social Determinants of Health     Financial Resource Strain: Low Risk     Difficulty of Paying Living Expenses: Not hard at all   Food Insecurity: No Food Insecurity    Worried About [address] in the Last Year: Never true    [address] in the Last Year: Never true   Transportation Needs:     Lack of Transportation (Medical): Not on file    Lack of Transportation (Non-Medical):  Not on file   Physical Activity:     Days of Exercise per Week: Not on file    Minutes of Exercise per Session: Not on file   Stress:     Feeling of Stress : Not on file   Social Connections:     Frequency of Communication with Friends and Family: Not on file    Frequency of Social Gatherings with Friends and Family: Not on file    Attends Muslim Services: Not on file    Active Member of [address] [place] [address] [employer] or Organizations: Not on file    Attends Club or Organization Meetings: Not on file    Marital Status: Not on file   Intimate Partner Violence:     Fear of Current or Ex-Partner: Not on file    Emotionally Abused: Not on file    Physically Abused: Not on file    Sexually Abused: Not on file   Housing Stability:     Unable to Pay for Housing in the Last Year: Not on file    Number of [place] in the Last Year: Not on file    Unstable Housing in the Last Year: Not on file Current Outpatient Medications   Medication Sig Dispense Refill    phentermine (ADIPEX-P) 37.5 MG tablet Take 1 tablet by mouth every morning (before breakfast) for 30 days. BMI 44. 30 tablet 0    phentermine (ADIPEX-P) 37.5 MG tablet Take 1 tablet by mouth every morning (before breakfast) for 30 days. BMI 44. 30 tablet 0    clindamycin-benzoyl peroxide (BENZACLIN) 1-5 % gel Apply pea sized amount to effected area topically daily 50 g 2    amLODIPine (NORVASC) 5 MG tablet Take 1 tablet by mouth daily 90 tablet 1    valACYclovir (VALTREX) 500 MG tablet Take 1 tablet by mouth 2 times daily 20 tablet 3    LINZESS 145 MCG capsule       lisinopril (PRINIVIL;ZESTRIL) 20 MG tablet       Indomethacin (INDOCIN PO) Take by mouth PRN      Acetaminophen (TYLENOL 8 HOUR PO) Take by mouth      omeprazole (PRILOSEC) 40 MG delayed release capsule Take 40 mg by mouth daily       No current facility-administered medications for this visit. No Known Allergies    Review of Systems:         Review of Systems   Constitutional: Negative. HENT: Negative. Eyes: Negative. Respiratory: Negative. Cardiovascular: Negative. Gastrointestinal: Positive for constipation. Endocrine: Negative. Genitourinary: Negative. Musculoskeletal: Negative. Skin: Negative. Allergic/Immunologic: Negative. Neurological: Negative. Hematological: Negative. Psychiatric/Behavioral: Negative. OBJECTIVE:  Physical Exam:    /80   Pulse 70   Ht 5' 8\" (1.727 m)   Wt 233 lb (105.7 kg)   LMP 06/15/2019   SpO2 100%   BMI 35.43 kg/m²      Physical Exam  Constitutional:       Appearance: Normal appearance. HENT:      Head: Normocephalic. Nose: Nose normal.      Mouth/Throat:      Pharynx: Oropharynx is clear. Eyes:      Conjunctiva/sclera: Conjunctivae normal.      Pupils: Pupils are equal, round, and reactive to light. Cardiovascular:      Rate and Rhythm: Normal rate.       Pulses: Normal pulses. Pulmonary:      Effort: Pulmonary effort is normal.      Breath sounds: Normal breath sounds. Abdominal:      General: Bowel sounds are normal.   Musculoskeletal:         General: Normal range of motion. Cervical back: Normal range of motion. Skin:     General: Skin is warm and dry. Capillary Refill: Capillary refill takes less than 2 seconds. Neurological:      General: No focal deficit present. Mental Status: She is alert and oriented to person, place, and time. Psychiatric:         Mood and Affect: Mood normal.         Behavior: Behavior normal.           ASSESSMENT:  1. Obesity (BMI 30-39.9)  - Continue tracking calories; about  7543-7013 daily  - 64 oz water daily  - Increase activity as able    2. Medication management  - Doing well; down 7.1 since last visit and 16 pounds total  - Starting month 3 of 3   - Denies any side effects  - Start Qsymia next visit  - Rx refill sent    PLAN:    -Continue Adipex. RX REFILLED.    -BMI is over 30, or over 27 with weight-related risk factors.     -Pt demonstrated weight loss since last visit.    -Pt aware Adipex can only be used short term (3 months). -Pt aware no breaks in treatment allowed or must be off for 6 months.    -Pt aware that weight must be lost at each visit or medication will be discontinued.     -Pt is aware that in order to be successful, must combine Adipex with appropriate diet and exercise plan. -Pt aware must continue to meet monthly in person while on this medication. F/u in one month. -Check [place] [facility] Reporting System. Any concerns: NONE Reported     - Current birth control measures include: Hysterectomy    -If elderly or renal impairment, will use lower dose (15 mg daily) and avoid all together if GFR is less than 15. N/A      No orders of the defined types were placed in this encounter.        Orders Placed This Encounter   Medications    phentermine (ADIPEX-P) 37.5 MG tablet     Sig: Take 1 tablet by mouth every morning (before breakfast) for 30 days. BMI 44. Dispense:  30 tablet     Refill:  0        Follow Up:  Return in about 1 month (around 6/2/2022).       JOANNE Busby - CNP

## 2022-06-01 ENCOUNTER — OFFICE VISIT (OUTPATIENT)
Dept: BARIATRICS/WEIGHT MGMT | Age: 42
End: 2022-06-01
Payer: COMMERCIAL

## 2022-06-01 VITALS
WEIGHT: 237.8 LBS | SYSTOLIC BLOOD PRESSURE: 120 MMHG | DIASTOLIC BLOOD PRESSURE: 80 MMHG | BODY MASS INDEX: 36.04 KG/M2 | OXYGEN SATURATION: 100 % | HEIGHT: 68 IN | HEART RATE: 89 BPM

## 2022-06-01 DIAGNOSIS — E66.9 OBESITY (BMI 30-39.9): Primary | ICD-10-CM

## 2022-06-01 DIAGNOSIS — Z79.899 MEDICATION MANAGEMENT: ICD-10-CM

## 2022-06-01 PROCEDURE — 1036F TOBACCO NON-USER: CPT | Performed by: NURSE PRACTITIONER

## 2022-06-01 PROCEDURE — G8427 DOCREV CUR MEDS BY ELIG CLIN: HCPCS | Performed by: NURSE PRACTITIONER

## 2022-06-01 PROCEDURE — 99212 OFFICE O/P EST SF 10 MIN: CPT | Performed by: NURSE PRACTITIONER

## 2022-06-01 PROCEDURE — G8417 CALC BMI ABV UP PARAM F/U: HCPCS | Performed by: NURSE PRACTITIONER

## 2022-06-01 ASSESSMENT — ENCOUNTER SYMPTOMS
EYES NEGATIVE: 1
RESPIRATORY NEGATIVE: 1
GASTROINTESTINAL NEGATIVE: 1
ALLERGIC/IMMUNOLOGIC NEGATIVE: 1

## 2022-06-01 NOTE — PROGRESS NOTES
Chief Complaint   Patient presents with    Weight Management     3rd refill adipex          SUBJECTIVE:    HPI: Patient is here with complaints of: Monthly Adipex visit. Obesity with a BMI of Body mass index is 36.16 kg/m². .    Current weight: 237 pounds    Weight change since last visit: +4 pounds (if pt failed to lose weight, cannot remain on medication). Any new absolute contraindications (glaucoma, drug abuse, CAD, uncontrolled HTN, arrhythmias, stroke, CHF, hyperthyroidism, MAOI use, pregnant or breastfeeding) to being on medication: DENIES     Pt complains of the following side effects: DENIES    Current dietary measures: not tracking calories; increased stress eating; not taking Adipex on a regular basis    Current exercise measures: not currently    I have reviewed the patient's(pertinent information to this visit) medical history, family history(scanned in  the [address] [place] [address] under \"patient questioner\"), social history and review of systems with the patient today in the office.           Past Surgical History:   Procedure Laterality Date     SECTION       &     CHOLECYSTECTOMY  08/15/2018    laprascopic    COLONOSCOPY      DILATION AND CURETTAGE      ENDOMETRIAL ABLATION      HERNIA REPAIR Right 2021    ROBOTIC EXPLORATION, HERNIA RIGHT INGUINAL REPAIR AND VENTRAL HERNIA REPAIR LAPAROSCOPIC performed by Vicente Collier MD at [address] [place] [address]    OVARY REMOVAL Right     SALPINGECTOMY Bilateral     UPPER GASTROINTESTINAL ENDOSCOPY  2017       Past Medical History:   Diagnosis Date    Excessive daytime sleepiness 2017    Fibromyalgia     GERD (gastroesophageal reflux disease)     Hernia of abdominal wall     Herpes simplex virus (HSV) infection     Hypertension     Hypertension     Morbid obesity ([address] 2017    Obesity        Family History   Problem Relation Age of Onset    Hypertension Mother     Hypertension Father     Hearing Loss Brother     High Blood Pressure Maternal Grandmother     Arthritis Maternal Grandfather        Social History     Socioeconomic History    Marital status: Single     Spouse name: Not on file    Number of children: Not on file    Years of education: Not on file    Highest education level: Not on file   Occupational History    Not on file   Tobacco Use    Smoking status: Never Smoker    Smokeless tobacco: Never Used   Vaping Use    Vaping Use: Never used   Substance and Sexual Activity    Alcohol use: Yes     Comment: social    Drug use: No    Sexual activity: Not on file   Other Topics Concern    Not on file   Social History Narrative    Not on file     Social Determinants of Health     Financial Resource Strain: Low Risk     Difficulty of Paying Living Expenses: Not hard at all   Food Insecurity: No Food Insecurity    Worried About Running Out of Food in the Last Year: Never true    [address] in the Last Year: Never true   Transportation Needs:     Lack of Transportation (Medical): Not on file    Lack of Transportation (Non-Medical):  Not on file   Physical Activity:     Days of Exercise per Week: Not on file    Minutes of Exercise per Session: Not on file   Stress:     Feeling of Stress : Not on file   Social Connections:     Frequency of Communication with Friends and Family: Not on file    Frequency of Social Gatherings with Friends and Family: Not on file    Attends Orthodox Services: Not on file    Active Member of Clubs or Organizations: Not on file    Attends Club or Organization Meetings: Not on file    Marital Status: Not on file   Intimate Partner Violence:     Fear of Current or Ex-Partner: Not on file    Emotionally Abused: Not on file    Physically Abused: Not on file    Sexually Abused: Not on file   Housing Stability:     Unable to Pay for Housing in the Last Year: Not on file    Number of [place] in the Last Year: Not on file    Unstable Housing in the Last Year: Not on file       Current Outpatient Medications   Medication Sig Dispense Refill    phentermine (ADIPEX-P) 37.5 MG tablet Take 1 tablet by mouth every morning (before breakfast) for 30 days. BMI 44. 30 tablet 0    LINZESS 145 MCG capsule       Acetaminophen (TYLENOL 8 HOUR PO) Take by mouth      clindamycin-benzoyl peroxide (BENZACLIN) 1-5 % gel Apply pea sized amount to effected area topically daily 50 g 2    amLODIPine (NORVASC) 5 MG tablet Take 1 tablet by mouth daily 90 tablet 1    valACYclovir (VALTREX) 500 MG tablet Take 1 tablet by mouth 2 times daily (Patient not taking: Reported on 6/1/2022) 20 tablet 3    lisinopril (PRINIVIL;ZESTRIL) 20 MG tablet  (Patient not taking: Reported on 6/1/2022)      Indomethacin (INDOCIN PO) Take by mouth PRN (Patient not taking: Reported on 6/1/2022)      omeprazole (PRILOSEC) 40 MG delayed release capsule Take 40 mg by mouth daily (Patient not taking: Reported on 6/1/2022)       No current facility-administered medications for this visit. No Known Allergies    Review of Systems:         Review of Systems   Constitutional: Negative. HENT: Negative. Eyes: Negative. Respiratory: Negative. Cardiovascular: Negative. Gastrointestinal: Negative. Endocrine: Negative. Genitourinary: Negative. Musculoskeletal: Negative. Skin: Negative. Allergic/Immunologic: Negative. Neurological: Negative. Hematological: Negative. Psychiatric/Behavioral: Negative. OBJECTIVE:  Physical Exam:    /80   Pulse 89   Ht 5' 8\" (1.727 m)   Wt 237 lb 12.8 oz (107.9 kg)   LMP 06/15/2019   SpO2 100%   BMI 36.16 kg/m²      Physical Exam  Constitutional:       Appearance: Normal appearance. HENT:      Head: Normocephalic. Nose: Nose normal.      Mouth/Throat:      Pharynx: Oropharynx is clear. Eyes:      Conjunctiva/sclera: Conjunctivae normal.      Pupils: Pupils are equal, round, and reactive to light. Cardiovascular:      Rate and Rhythm: Normal rate. Pulses: Normal pulses. Pulmonary:      Effort: Pulmonary effort is normal.      Breath sounds: Normal breath sounds. Abdominal:      General: Bowel sounds are normal.   Musculoskeletal:         General: Normal range of motion. Cervical back: Normal range of motion. Skin:     General: Skin is warm and dry. Capillary Refill: Capillary refill takes less than 2 seconds. Neurological:      General: No focal deficit present. Mental Status: She is alert and oriented to person, place, and time. Psychiatric:         Mood and Affect: Mood normal.         Behavior: Behavior normal.           ASSESSMENT:  1. Obesity (BMI 30-39.9)  - Restart  tracking calories; about  7487-4649 daily  - 64 oz water daily  - Increase activity as able  - Limit stress eating    2. Medication management  - Weight gain this month  - Not taking Adipex on a regular basis  - No longer meets criteria for weight loss medications  - Considering diet base program  - RTC as needed for weight gain      No orders of the defined types were placed in this encounter. No orders of the defined types were placed in this encounter.        Follow Up:  Return to restart program.      JOANNE Bowling - CNP

## 2022-07-19 ENCOUNTER — OFFICE VISIT (OUTPATIENT)
Dept: OBGYN | Age: 42
End: 2022-07-19
Payer: COMMERCIAL

## 2022-07-19 VITALS
DIASTOLIC BLOOD PRESSURE: 111 MMHG | SYSTOLIC BLOOD PRESSURE: 152 MMHG | WEIGHT: 238 LBS | HEIGHT: 68 IN | BODY MASS INDEX: 36.07 KG/M2

## 2022-07-19 DIAGNOSIS — N74 FEMALE PELVIC INFLAMMATORY DISORDERS IN DISEASES CLASSIFIED ELSEWHERE: ICD-10-CM

## 2022-07-19 DIAGNOSIS — I10 ESSENTIAL HYPERTENSION: ICD-10-CM

## 2022-07-19 DIAGNOSIS — M54.50 LOW BACK PAIN WITHOUT SCIATICA, UNSPECIFIED BACK PAIN LATERALITY, UNSPECIFIED CHRONICITY: ICD-10-CM

## 2022-07-19 DIAGNOSIS — R30.0 BURNING WITH URINATION: Primary | ICD-10-CM

## 2022-07-19 DIAGNOSIS — N89.8 VAGINAL ODOR: ICD-10-CM

## 2022-07-19 LAB
BACTERIA: ABNORMAL /HPF
BILIRUBIN URINE: NEGATIVE
BILIRUBIN, POC: NORMAL
BLOOD URINE, POC: NORMAL
BLOOD, URINE: NEGATIVE
CLARITY, POC: NORMAL
CLARITY: CLEAR
COLOR, POC: NORMAL
COLOR: YELLOW
EPITHELIAL CELLS, UA: 5 /HPF (ref 0–5)
GLUCOSE URINE, POC: NORMAL
GLUCOSE URINE: NEGATIVE MG/DL
HYALINE CASTS: 0 /LPF (ref 0–8)
KETONES, POC: NORMAL
KETONES, URINE: NEGATIVE MG/DL
LEUKOCYTE EST, POC: NORMAL
LEUKOCYTE ESTERASE, URINE: ABNORMAL
MICROSCOPIC EXAMINATION: YES
NITRITE, POC: NORMAL
NITRITE, URINE: NEGATIVE
PH UA: 6 (ref 5–8)
PH, POC: 6
PROTEIN UA: NEGATIVE MG/DL
PROTEIN, POC: NORMAL
RBC UA: 1 /HPF (ref 0–4)
SPECIFIC GRAVITY UA: 1.02 (ref 1–1.03)
SPECIFIC GRAVITY, POC: NORMAL
URINE TYPE: ABNORMAL
UROBILINOGEN, POC: NORMAL
UROBILINOGEN, URINE: 0.2 E.U./DL
WBC UA: 8 /HPF (ref 0–5)

## 2022-07-19 PROCEDURE — G8427 DOCREV CUR MEDS BY ELIG CLIN: HCPCS | Performed by: OBSTETRICS & GYNECOLOGY

## 2022-07-19 PROCEDURE — 99214 OFFICE O/P EST MOD 30 MIN: CPT | Performed by: OBSTETRICS & GYNECOLOGY

## 2022-07-19 PROCEDURE — 81002 URINALYSIS NONAUTO W/O SCOPE: CPT | Performed by: OBSTETRICS & GYNECOLOGY

## 2022-07-19 PROCEDURE — 1036F TOBACCO NON-USER: CPT | Performed by: OBSTETRICS & GYNECOLOGY

## 2022-07-19 PROCEDURE — G8417 CALC BMI ABV UP PARAM F/U: HCPCS | Performed by: OBSTETRICS & GYNECOLOGY

## 2022-07-19 RX ORDER — CLINDAMYCIN PHOSPHATE 20 MG/G
1 CREAM VAGINAL NIGHTLY
Qty: 40 G | Refills: 0 | Status: SHIPPED | OUTPATIENT
Start: 2022-07-19 | End: 2022-07-24

## 2022-07-19 RX ORDER — NITROFURANTOIN 25; 75 MG/1; MG/1
100 CAPSULE ORAL 2 TIMES DAILY
Qty: 14 CAPSULE | Refills: 0 | Status: SHIPPED | OUTPATIENT
Start: 2022-07-19 | End: 2022-07-26

## 2022-07-19 RX ORDER — AMLODIPINE BESYLATE 10 MG/1
10 TABLET ORAL DAILY
Qty: 90 TABLET | Refills: 1 | Status: SHIPPED | OUTPATIENT
Start: 2022-07-19 | End: 2022-08-25

## 2022-07-19 SDOH — ECONOMIC STABILITY: FOOD INSECURITY: WITHIN THE PAST 12 MONTHS, YOU WORRIED THAT YOUR FOOD WOULD RUN OUT BEFORE YOU GOT MONEY TO BUY MORE.: NEVER TRUE

## 2022-07-19 SDOH — ECONOMIC STABILITY: FOOD INSECURITY: WITHIN THE PAST 12 MONTHS, THE FOOD YOU BOUGHT JUST DIDN'T LAST AND YOU DIDN'T HAVE MONEY TO GET MORE.: NEVER TRUE

## 2022-07-19 SDOH — ECONOMIC STABILITY: TRANSPORTATION INSECURITY
IN THE PAST 12 MONTHS, HAS LACK OF TRANSPORTATION KEPT YOU FROM MEETINGS, WORK, OR FROM GETTING THINGS NEEDED FOR DAILY LIVING?: NO

## 2022-07-19 SDOH — ECONOMIC STABILITY: TRANSPORTATION INSECURITY
IN THE PAST 12 MONTHS, HAS THE LACK OF TRANSPORTATION KEPT YOU FROM MEDICAL APPOINTMENTS OR FROM GETTING MEDICATIONS?: NO

## 2022-07-19 ASSESSMENT — SOCIAL DETERMINANTS OF HEALTH (SDOH): HOW HARD IS IT FOR YOU TO PAY FOR THE VERY BASICS LIKE FOOD, HOUSING, MEDICAL CARE, AND HEATING?: NOT HARD AT ALL

## 2022-07-19 ASSESSMENT — ENCOUNTER SYMPTOMS
EYES NEGATIVE: 1
ALLERGIC/IMMUNOLOGIC NEGATIVE: 1
GASTROINTESTINAL NEGATIVE: 1
RESPIRATORY NEGATIVE: 1

## 2022-07-19 NOTE — PROGRESS NOTES
7/19/22    Vicky Fee N C/Abby [address]  1980    Chief Complaint   Patient presents with    Other     Pt c/o burning with urination and lower back pain with vaginal irritation x 2 wks. Randy Hall is a 43 y.o. female who presents today for evaluation of dysuria, back pain, vaginal odor for 2 weeks. Past Medical History:   Diagnosis Date    Excessive daytime sleepiness 12/13/2017    Fibromyalgia     GERD (gastroesophageal reflux disease)     Hernia of abdominal wall     Herpes simplex virus (HSV) infection     Hypertension     Hypertension     Morbid obesity ([address] 12/13/2017    Obesity        Past Surgical History:   Procedure Laterality Date    [address] & [address]    CHOLECYSTECTOMY  08/15/2018    laprascopic    COLONOSCOPY      DILATION AND CURETTAGE      ENDOMETRIAL ABLATION      HERNIA REPAIR Right 7/16/2021    ROBOTIC EXPLORATION, HERNIA RIGHT INGUINAL REPAIR AND VENTRAL HERNIA REPAIR LAPAROSCOPIC performed by Jam uNnez MD at [address] ([address] [place] [address])  2020    LAVH    OVARY REMOVAL Right     SALPINGECTOMY Bilateral     UPPER GASTROINTESTINAL ENDOSCOPY  09/11/2017       Social History     Tobacco Use    Smoking status: Never    Smokeless tobacco: Never   Vaping Use    Vaping Use: Never used   Substance Use Topics    Alcohol use: Yes     Comment: social    Drug use: No       Family History   Problem Relation Age of Onset    Hypertension Mother     Hypertension Father     Hearing Loss Brother     High Blood Pressure Maternal Grandmother     Arthritis Maternal Grandfather        Current Outpatient Medications   Medication Sig Dispense Refill    Blood Pressure Monitoring (SPHYGMOMANOMETER) MISC 1 each by Does not apply route daily 1 each 0    amLODIPine (NORVASC) 10 MG tablet Take 1 tablet by mouth in the morning.  90 tablet 1    nitrofurantoin, macrocrystal-monohydrate, (MACROBID) 100 MG capsule Take 1 capsule by mouth in the morning and 1 capsule before bedtime. Do all this for 7 days. 14 capsule 0    clindamycin (CLEOCIN) 2 % vaginal cream Place 1 applicator vaginally nightly for 5 days Place vaginally nightly. 40 g 0    clindamycin-benzoyl peroxide (BENZACLIN) 1-5 % gel Apply pea sized amount to effected area topically daily 50 g 2    LINZESS 145 MCG capsule       Acetaminophen (TYLENOL 8 HOUR PO) Take by mouth       No current facility-administered medications for this visit. No Known Allergies    [id number]    Immunization History   Administered Date(s) Administered    COVID-19, PFIZER PURPLE top, DILUTE for use, (age 15 y+), 30mcg/0.3mL 03/23/2021, 04/13/2021    MMR 03/24/2008    Tdap (Boostrix, Adacel) 03/24/2008       Review of Systems   Constitutional: Negative. Eyes: Negative. Respiratory: Negative. Cardiovascular: Negative. Gastrointestinal: Negative. Endocrine: Negative. Genitourinary:  Positive for dysuria and flank pain. Vaginal odor   Skin: Negative. Allergic/Immunologic: Negative. Neurological: Negative. Hematological: Negative. Psychiatric/Behavioral: Negative. BP (!) 152/111   Ht 5' 8\" (1.727 m)   Wt 238 lb (108 kg)   LMP 06/15/2019   BMI 36.19 kg/m²   148/112  Physical Exam    Results for orders placed or performed in visit on 07/19/22   POCT Urinalysis no Micro   Result Value Ref Range    Color, UA      Clarity, UA      Glucose, UA POC neg     Bilirubin, UA      Ketones, UA      Spec Grav, UA      Blood, UA POC neg     pH, UA 6.0     Protein, UA POC +     Urobilinogen, UA      Leukocytes, UA neg     Nitrite, UA neg        ASSESSMENT AND PLAN   Diagnosis Orders   1. Burning with urination  POCT Urinalysis no Micro    C.trachomatis N.gonorrhoeae DNA, Urine    Culture, Urine    Urinalysis with Microscopic    nitrofurantoin, macrocrystal-monohydrate, (MACROBID) 100 MG capsule      2.  Low back pain without sciatica, unspecified back pain laterality, unspecified chronicity  POCT Urinalysis no Micro C.trachomatis N.gonorrhoeae DNA, Urine    Culture, Urine    Urinalysis with Microscopic    nitrofurantoin, macrocrystal-monohydrate, (MACROBID) 100 MG capsule      3. Essential hypertension  Blood Pressure Monitoring (SPHYGMOMANOMETER) MISC    amLODIPine (NORVASC) 10 MG tablet      4. Vaginal odor  Vaginal Pathogens Probes *A    clindamycin (CLEOCIN) 2 % vaginal cream      5. Female pelvic inflammatory disorders in diseases classified elsewhere  Vaginal Pathogens Probes *A        Increase norvasc from 5mg to 10mg  Take bp daily  Follow up dr. Keyona Butler    Follow up here if symptoms are not resolved    Return if symptoms worsen or fail to improve.     Nina Belle MD

## 2022-07-20 LAB
CANDIDA SPECIES, DNA PROBE: NORMAL
GARDNERELLA VAGINALIS, DNA PROBE: NORMAL
TRICHOMONAS VAGINALIS DNA: NORMAL

## 2022-07-21 LAB
C. TRACHOMATIS DNA ,URINE: NEGATIVE
N. GONORRHOEAE DNA, URINE: NEGATIVE
URINE CULTURE, ROUTINE: NORMAL

## 2022-08-22 ENCOUNTER — OFFICE VISIT (OUTPATIENT)
Dept: FAMILY MEDICINE CLINIC | Age: 42
End: 2022-08-22
Payer: COMMERCIAL

## 2022-08-22 VITALS
HEIGHT: 68 IN | WEIGHT: 246 LBS | HEART RATE: 77 BPM | SYSTOLIC BLOOD PRESSURE: 136 MMHG | DIASTOLIC BLOOD PRESSURE: 86 MMHG | BODY MASS INDEX: 37.28 KG/M2 | RESPIRATION RATE: 16 BRPM | OXYGEN SATURATION: 99 %

## 2022-08-22 DIAGNOSIS — I10 ESSENTIAL HYPERTENSION: Primary | ICD-10-CM

## 2022-08-22 DIAGNOSIS — Z13.220 LIPID SCREENING: ICD-10-CM

## 2022-08-22 DIAGNOSIS — L70.0 CYSTIC ACNE: ICD-10-CM

## 2022-08-22 PROCEDURE — G8417 CALC BMI ABV UP PARAM F/U: HCPCS | Performed by: STUDENT IN AN ORGANIZED HEALTH CARE EDUCATION/TRAINING PROGRAM

## 2022-08-22 PROCEDURE — G8427 DOCREV CUR MEDS BY ELIG CLIN: HCPCS | Performed by: STUDENT IN AN ORGANIZED HEALTH CARE EDUCATION/TRAINING PROGRAM

## 2022-08-22 PROCEDURE — 99214 OFFICE O/P EST MOD 30 MIN: CPT | Performed by: STUDENT IN AN ORGANIZED HEALTH CARE EDUCATION/TRAINING PROGRAM

## 2022-08-22 PROCEDURE — 1036F TOBACCO NON-USER: CPT | Performed by: STUDENT IN AN ORGANIZED HEALTH CARE EDUCATION/TRAINING PROGRAM

## 2022-08-22 RX ORDER — LOSARTAN POTASSIUM 25 MG/1
25 TABLET ORAL DAILY
Qty: 30 TABLET | Refills: 2 | Status: SHIPPED | OUTPATIENT
Start: 2022-08-22 | End: 2022-11-20

## 2022-08-22 ASSESSMENT — ENCOUNTER SYMPTOMS
SORE THROAT: 0
ABDOMINAL PAIN: 0
NAUSEA: 0
SHORTNESS OF BREATH: 0
WHEEZING: 0

## 2022-08-22 NOTE — PROGRESS NOTES
8/22/2022    [address]    Chief Complaint   Patient presents with    6 Month Follow-Up     Presenting for 6 month follow up visit. Discuss Medications     Has not had BP med in almost two weeks. Stopped per self. Patient was told she was dehydrated. HPI  History was obtained from patient. Maria E Mc is a 43 y.o. female with a PMHx as listed below who presents today for   Follow up 6 month. Patient is now off bp pill she notes she gets a headache. Mild elevated bp today      1. Essential hypertension    2. Cystic acne    3. Lipid screening             REVIEW OF SYMPTOMS    Review of Systems   Constitutional:  Negative for chills and fatigue. HENT:  Negative for congestion and sore throat. Respiratory:  Negative for shortness of breath and wheezing. Cardiovascular:  Negative for chest pain and palpitations. Gastrointestinal:  Negative for abdominal pain and nausea. Genitourinary:  Negative for frequency and urgency. Neurological:  Negative for light-headedness. PAST MEDICAL HISTORY  Past Medical History:   Diagnosis Date    Excessive daytime sleepiness 12/13/2017    Fibromyalgia     GERD (gastroesophageal reflux disease)     Hernia of abdominal wall     Herpes simplex virus (HSV) infection     Hypertension     Hypertension     Morbid obesity ([address] 12/13/2017    Obesity        FAMILY HISTORY  Family History   Problem Relation Age of Onset    Hypertension Mother     Hypertension Father     Hearing Loss Brother     High Blood Pressure Maternal Grandmother     Arthritis Maternal Grandfather        SOCIAL HISTORY  Social History     Socioeconomic History    Marital status: Single     Spouse name: None    Number of children: None    Years of education: None    Highest education level: None   Tobacco Use    Smoking status: Never    Smokeless tobacco: Never   Vaping Use    Vaping Use: Never used   Substance and Sexual Activity    Alcohol use: Yes     Comment: social    Drug use:  No Social Determinants of Health     Financial Resource Strain: Low Risk     Difficulty of Paying Living Expenses: Not hard at all   Food Insecurity: No Food Insecurity    Worried About [address] in the Last Year: Never true    [facility] in the Last Year: Never true   Transportation Needs: No Transportation Needs    Lack of Transportation (Medical): No    Lack of Transportation (Non-Medical): No        SURGICAL HISTORY  Past Surgical History:   Procedure Laterality Date    [address]    CHOLECYSTECTOMY  08/15/2018    laprascopic    COLONOSCOPY      DILATION AND CURETTAGE      ENDOMETRIAL ABLATION      HERNIA REPAIR Right 7/16/2021    ROBOTIC EXPLORATION, HERNIA RIGHT INGUINAL REPAIR AND VENTRAL HERNIA REPAIR LAPAROSCOPIC performed by Sandra Ryan MD at [facility] ([facility])  2020    LAVH    OVARY REMOVAL Right     SALPINGECTOMY Bilateral     UPPER GASTROINTESTINAL ENDOSCOPY  09/11/2017                 CURRENT MEDICATIONS  Current Outpatient Medications   Medication Sig Dispense Refill    losartan (COZAAR) 25 MG tablet Take 1 tablet by mouth daily 30 tablet 2    amLODIPine (NORVASC) 10 MG tablet Take 1 tablet by mouth in the morning. (Patient not taking: Reported on 8/22/2022) 90 tablet 1    clindamycin-benzoyl peroxide (BENZACLIN) 1-5 % gel Apply pea sized amount to effected area topically daily 50 g 2    LINZESS 145 MCG capsule       Acetaminophen (TYLENOL 8 HOUR PO) Take by mouth       No current facility-administered medications for this visit. ALLERGIES  No Known Allergies    PHYSICAL EXAM    /86   Pulse 77   Resp 16   Ht 5' 8\" (1.727 m)   Wt 246 lb (111.6 kg)   LMP 06/15/2019   SpO2 99%   BMI 37.40 kg/m²     Physical Exam  Constitutional:       Appearance: Normal appearance. HENT:      Head: Normocephalic and atraumatic. Eyes:      Extraocular Movements: Extraocular movements intact.       Pupils: Pupils are equal, round, and reactive to light. Cardiovascular:      Rate and Rhythm: Normal rate and regular rhythm. Pulses: Normal pulses. Heart sounds: No murmur heard. No friction rub. No gallop. Skin:     General: Skin is warm and dry. Comments: Comeodomes b/l cheeks   Neurological:      General: No focal deficit present. Mental Status: She is alert. Psychiatric:         Mood and Affect: Mood normal.         Behavior: Behavior normal.       ASSESSMENT & PLAN    1. Essential hypertension    - losartan (COZAAR) 25 MG tablet; Take 1 tablet by mouth daily  Dispense: 30 tablet; Refill: 2  - CBC with Auto Differential; Future  - Comprehensive Metabolic Panel; Future    2. Cystic acne    - External Referral To Dermatology    3. Lipid screening    - LIPID PANEL; Future    Referral [facility] patient preference insurance for acne  Bp change to losartan due to adverse effects   F/u labs prior next appt. Return in about 6 months (around 2/22/2023).          Electronically signed by Jamshid Redding DO on 8/22/2022

## 2022-08-25 ENCOUNTER — OFFICE VISIT (OUTPATIENT)
Dept: OBGYN | Age: 42
End: 2022-08-25
Payer: COMMERCIAL

## 2022-08-25 VITALS
BODY MASS INDEX: 37.28 KG/M2 | WEIGHT: 246 LBS | SYSTOLIC BLOOD PRESSURE: 151 MMHG | HEIGHT: 68 IN | DIASTOLIC BLOOD PRESSURE: 99 MMHG

## 2022-08-25 DIAGNOSIS — N74 FEMALE PELVIC INFLAMMATORY DISORDERS IN DISEASES CLASSIFIED ELSEWHERE: ICD-10-CM

## 2022-08-25 DIAGNOSIS — N89.8 VAGINAL DISCHARGE: Primary | ICD-10-CM

## 2022-08-25 LAB
BACTERIA: ABNORMAL /HPF
BILIRUBIN URINE: NEGATIVE
BLOOD, URINE: ABNORMAL
CLARITY: ABNORMAL
COLOR: YELLOW
EPITHELIAL CELLS, UA: 8 /HPF (ref 0–5)
GLUCOSE URINE: NEGATIVE MG/DL
HYALINE CASTS: 2 /LPF (ref 0–8)
KETONES, URINE: NEGATIVE MG/DL
LEUKOCYTE ESTERASE, URINE: ABNORMAL
MICROSCOPIC EXAMINATION: YES
NITRITE, URINE: NEGATIVE
PH UA: 6.5 (ref 5–8)
PROTEIN UA: NEGATIVE MG/DL
RBC UA: 5 /HPF (ref 0–4)
SPECIFIC GRAVITY UA: 1.02 (ref 1–1.03)
URINE TYPE: ABNORMAL
UROBILINOGEN, URINE: 0.2 E.U./DL
WBC UA: 24 /HPF (ref 0–5)

## 2022-08-25 PROCEDURE — 1036F TOBACCO NON-USER: CPT | Performed by: OBSTETRICS & GYNECOLOGY

## 2022-08-25 PROCEDURE — G8417 CALC BMI ABV UP PARAM F/U: HCPCS | Performed by: OBSTETRICS & GYNECOLOGY

## 2022-08-25 PROCEDURE — G8427 DOCREV CUR MEDS BY ELIG CLIN: HCPCS | Performed by: OBSTETRICS & GYNECOLOGY

## 2022-08-25 PROCEDURE — 99214 OFFICE O/P EST MOD 30 MIN: CPT | Performed by: OBSTETRICS & GYNECOLOGY

## 2022-08-25 RX ORDER — FLUCONAZOLE 150 MG/1
150 TABLET ORAL ONCE
Qty: 2 TABLET | Refills: 0 | Status: SHIPPED | OUTPATIENT
Start: 2022-08-25 | End: 2022-08-25

## 2022-08-25 ASSESSMENT — ENCOUNTER SYMPTOMS
ALLERGIC/IMMUNOLOGIC NEGATIVE: 1
EYES NEGATIVE: 1
RESPIRATORY NEGATIVE: 1
GASTROINTESTINAL NEGATIVE: 1

## 2022-08-25 NOTE — PROGRESS NOTES
on 8/22/2022) 90 tablet 1    clindamycin-benzoyl peroxide (BENZACLIN) 1-5 % gel Apply pea sized amount to effected area topically daily 50 g 2    LINZESS 145 MCG capsule       Acetaminophen (TYLENOL 8 HOUR PO) Take by mouth       No current facility-administered medications for this visit. No Known Allergies    [id number]    Immunization History   Administered Date(s) Administered    COVID-19, PFIZER PURPLE top, DILUTE for use, (age 15 y+), 30mcg/0.3mL 03/23/2021, 04/13/2021    MMR 03/24/2008    Tdap (Boostrix, Adacel) 03/24/2008       Review of Systems   Constitutional: Negative. Eyes: Negative. Respiratory: Negative. Cardiovascular: Negative. Gastrointestinal: Negative. Endocrine: Negative. Genitourinary:  Positive for vaginal discharge and vaginal pain. Musculoskeletal: Negative. Skin: Negative. Allergic/Immunologic: Negative. Neurological: Negative. Hematological: Negative. Psychiatric/Behavioral: Negative. BP (!) 151/99   Ht 5' 8\" (1.727 m)   Wt 246 lb (111.6 kg)   LMP 06/15/2019   BMI 37.40 kg/m²     Physical Exam  Exam conducted with a chaperone present. Constitutional:       Appearance: Normal appearance. HENT:      Head: Normocephalic and atraumatic. Nose: Nose normal.   Cardiovascular:      Rate and Rhythm: Normal rate. Pulses: Normal pulses. Pulmonary:      Effort: Pulmonary effort is normal.   Abdominal:      General: Abdomen is flat. There is no distension. Palpations: Abdomen is soft. There is no mass. Tenderness: There is no abdominal tenderness. Hernia: No hernia is present. There is no hernia in the left inguinal area or right inguinal area. Genitourinary:     Exam position: Lithotomy position. Pubic Area: No rash. Labia:         Right: No rash, tenderness or lesion. Left: No rash, tenderness or lesion. Urethra: No prolapse, urethral pain, urethral swelling or urethral lesion.       Vagina: Vaginal discharge present. No erythema, tenderness, bleeding or lesions. Uterus: Absent. Adnexa: Right adnexa normal and left adnexa normal.        Right: No mass, tenderness or fullness. Left: No mass, tenderness or fullness. Rectum: No mass or anal fissure. Normal anal tone. Comments: White discharge  Musculoskeletal:      Cervical back: Normal range of motion and neck supple. Lymphadenopathy:      Lower Body: No right inguinal adenopathy. No left inguinal adenopathy. Skin:     General: Skin is warm and dry. Neurological:      General: No focal deficit present. Mental Status: She is alert and oriented to person, place, and time. Psychiatric:         Mood and Affect: Mood normal.         Behavior: Behavior normal.         Thought Content: Thought content normal.         Judgment: Judgment normal.       No results found for this visit on 08/25/22. ASSESSMENT AND PLAN   Diagnosis Orders   1. Vaginal discharge  Vaginal Pathogens Probes *A    C.trachomatis N.gonorrhoeae DNA    Culture, Urine    Urinalysis with Microscopic    Miscellaneous Sendout      2. Female pelvic inflammatory disorders in diseases classified elsewhere  Vaginal Pathogens Probes *A    C.trachomatis N.gonorrhoeae DNA    Culture, Urine    Urinalysis with Microscopic    Miscellaneous Sendout        Recommend acidophilus and boric acid vaginal suppostiroties as well to prevent infection    Return if symptoms worsen or fail to improve.     Ghada Townsend MD

## 2022-08-26 LAB
CANDIDA SPECIES, DNA PROBE: NORMAL
GARDNERELLA VAGINALIS, DNA PROBE: NORMAL
ORGANISM: ABNORMAL
TRICHOMONAS VAGINALIS DNA: NORMAL
URINE CULTURE, ROUTINE: ABNORMAL

## 2022-08-27 LAB
C TRACH DNA GENITAL QL NAA+PROBE: NEGATIVE
N. GONORRHOEAE DNA: NEGATIVE

## 2022-09-01 LAB
FINAL REPORT: NORMAL
PRELIMINARY: NORMAL

## 2022-09-06 RX ORDER — DOXYCYCLINE HYCLATE 100 MG
100 TABLET ORAL 2 TIMES DAILY
Qty: 14 TABLET | Refills: 0 | Status: SHIPPED | OUTPATIENT
Start: 2022-09-06 | End: 2022-09-13

## 2022-09-20 ENCOUNTER — OFFICE VISIT (OUTPATIENT)
Dept: OBGYN | Age: 42
End: 2022-09-20
Payer: COMMERCIAL

## 2022-09-20 VITALS
DIASTOLIC BLOOD PRESSURE: 118 MMHG | SYSTOLIC BLOOD PRESSURE: 171 MMHG | HEIGHT: 68 IN | BODY MASS INDEX: 36.83 KG/M2 | WEIGHT: 243 LBS

## 2022-09-20 DIAGNOSIS — R82.90 ABNORMAL URINE ODOR: ICD-10-CM

## 2022-09-20 DIAGNOSIS — Z90.710 STATUS POST HYSTERECTOMY: ICD-10-CM

## 2022-09-20 DIAGNOSIS — N89.8 VAGINAL ODOR: ICD-10-CM

## 2022-09-20 DIAGNOSIS — Z01.419 WOMEN'S ANNUAL ROUTINE GYNECOLOGICAL EXAMINATION: Primary | ICD-10-CM

## 2022-09-20 DIAGNOSIS — N93.9 VAGINAL SPOTTING: ICD-10-CM

## 2022-09-20 DIAGNOSIS — N89.8 VAGINAL DISCHARGE: ICD-10-CM

## 2022-09-20 PROCEDURE — 99396 PREV VISIT EST AGE 40-64: CPT

## 2022-09-20 ASSESSMENT — ENCOUNTER SYMPTOMS
ABDOMINAL PAIN: 0
RESPIRATORY NEGATIVE: 1
GASTROINTESTINAL NEGATIVE: 1

## 2022-09-20 NOTE — PROGRESS NOTES
9/20/22    Kala Cummins N [address]  1980    Chief Complaint   Patient presents with    Gynecologic Exam     Pt here for annual, had hyster/rso, is sexually active, hrt-none, pap-unsure, mammo-2020-normal.     Vaginal Discharge     Pt c/o  vaginal discharge, odor and itching x on and off x 4 months. light pink spotting 1 x last wk and 1 time this wk. Bailee for ureaplasma scheduled for 10/3/22 . The patient is a 43 y.o. female, [id number] who presents for her annual exam. She is not menopausal . She reports additional symptoms of vaginal discharge/irritation and urinary odor . She has had a hysterectomy with removal of right ovary. She is  sexually active. Pap smear history: Her last PAP smear was in unsure. Her results were normal.    Breast history: her most recent mammogram was in 2020.   The results were: Normal    Past Medical History:   Diagnosis Date    Excessive daytime sleepiness 12/13/2017    Fibromyalgia     GERD (gastroesophageal reflux disease)     Hernia of abdominal wall     Herpes simplex virus (HSV) infection     Hypertension     Hypertension     Morbid obesity ([address] 12/13/2017    Obesity     Vaginal discharge     Vaginal irritation     Vaginal odor        Past Surgical History:   Procedure Laterality Date    [address]    CHOLECYSTECTOMY  08/15/2018    laprascopic    COLONOSCOPY      DILATION AND CURETTAGE      ENDOMETRIAL ABLATION      HERNIA REPAIR Right 7/16/2021    ROBOTIC EXPLORATION, HERNIA RIGHT INGUINAL REPAIR AND VENTRAL HERNIA REPAIR LAPAROSCOPIC performed by Asa Dang MD at [address] ([address] [place] [address])  2020    LAVH    OVARY REMOVAL Right     SALPINGECTOMY Bilateral     UPPER GASTROINTESTINAL ENDOSCOPY  09/11/2017       Family History   Problem Relation Age of Onset    Hypertension Mother     Hypertension Father     Hearing Loss Brother     High Blood Pressure Maternal Grandmother     Arthritis Maternal Grandfather        Social History     Tobacco Use    Smoking status: Never    Smokeless tobacco: Never   Vaping Use    Vaping Use: Never used   Substance Use Topics    Alcohol use: Yes     Comment: social    Drug use: No       Current Outpatient Medications   Medication Sig Dispense Refill    losartan (COZAAR) 25 MG tablet Take 1 tablet by mouth daily 30 tablet 2    LINZESS 145 MCG capsule       Acetaminophen (TYLENOL 8 HOUR PO) Take by mouth      clindamycin-benzoyl peroxide (BENZACLIN) 1-5 % gel Apply pea sized amount to effected area topically daily (Patient not taking: Reported on 9/20/2022) 50 g 2     No current facility-administered medications for this visit. No Known Allergies    [id number]    Immunization History   Administered Date(s) Administered    COVID-19, PFIZER PURPLE top, DILUTE for use, (age 15 y+), 30mcg/0.3mL 03/23/2021, 04/13/2021    MMR 03/24/2008    Tdap (Boostrix, Adacel) 03/24/2008       Review of Systems   Constitutional: Negative. Negative for fatigue and fever. Respiratory: Negative. Gastrointestinal: Negative. Negative for abdominal pain. Endocrine: Negative. Genitourinary:  Positive for vaginal discharge. Negative for dysuria, frequency, menstrual problem, pelvic pain, vaginal bleeding and vaginal pain. Musculoskeletal: Negative. Skin: Negative. Neurological: Negative. Negative for dizziness and headaches. Psychiatric/Behavioral: Negative. BP (!) 171/118   Ht 5' 8\" (1.727 m)   Wt 243 lb (110.2 kg)   LMP 06/15/2019   BMI 36.95 kg/m²     Physical Exam  Vitals and nursing note reviewed. Constitutional:       General: She is not in acute distress. Appearance: Normal appearance. She is obese. HENT:      Head: Normocephalic and atraumatic. Pulmonary:      Effort: Pulmonary effort is normal. No respiratory distress. Chest:   Breasts:     Right: Normal. No axillary adenopathy or supraclavicular adenopathy.       Left: Normal. No axillary adenopathy or supraclavicular adenopathy. Abdominal:      Palpations: Abdomen is soft. Tenderness: There is no abdominal tenderness. Genitourinary:     General: Normal vulva. Exam position: Lithotomy position. Vagina: Vaginal discharge present. Musculoskeletal:         General: Normal range of motion. Cervical back: Normal range of motion. Lymphadenopathy:      Upper Body:      Right upper body: No supraclavicular or axillary adenopathy. Left upper body: No supraclavicular or axillary adenopathy. Skin:     General: Skin is warm and dry. Findings: No rash. Neurological:      General: No focal deficit present. Mental Status: She is alert and oriented to person, place, and time. Psychiatric:         Mood and Affect: Mood normal.         Behavior: Behavior normal.         Thought Content: Thought content normal.       No results found for this visit on 09/20/22. Assessment and Plan   Diagnosis Orders   1. Women's annual routine gynecological examination  RANDY DIGITAL SCREEN W OR WO CAD BILATERAL      2. Vaginal discharge  Vaginal Pathogens Probes *A    C.trachomatis N.gonorrhoeae DNA    Miscellaneous Sendout      3. Vaginal odor  Vaginal Pathogens Probes *A    C.trachomatis N.gonorrhoeae DNA    Miscellaneous Sendout      4. Abnormal urine odor  Culture, Urine      5. Vaginal spotting        6. Status post hysterectomy          Mammo odor, bv panel, g/c swab, myco/ureaplasma, urine culture. Will wait for results to send medication    No other concerns/complaints today    Return if symptoms worsen or fail to improve.     Sumaya Kelly PA-C

## 2022-09-21 LAB
CANDIDA SPECIES, DNA PROBE: ABNORMAL
GARDNERELLA VAGINALIS, DNA PROBE: ABNORMAL
TRICHOMONAS VAGINALIS DNA: ABNORMAL

## 2022-09-22 DIAGNOSIS — N76.0 BACTERIAL VAGINOSIS: Primary | ICD-10-CM

## 2022-09-22 DIAGNOSIS — B96.89 BACTERIAL VAGINOSIS: Primary | ICD-10-CM

## 2022-09-22 LAB
C TRACH DNA GENITAL QL NAA+PROBE: NEGATIVE
N. GONORRHOEAE DNA: NEGATIVE
URINE CULTURE, ROUTINE: NORMAL

## 2022-09-22 RX ORDER — METRONIDAZOLE 500 MG/1
500 TABLET ORAL 2 TIMES DAILY
Qty: 14 TABLET | Refills: 0 | Status: SHIPPED | OUTPATIENT
Start: 2022-09-22 | End: 2022-09-29

## 2022-09-27 LAB — MISCELLANEOUS LAB TEST ORDER: NORMAL

## 2023-01-16 ENCOUNTER — OFFICE VISIT (OUTPATIENT)
Dept: OBGYN | Age: 43
End: 2023-01-16
Payer: COMMERCIAL

## 2023-01-16 VITALS
WEIGHT: 254 LBS | HEIGHT: 68 IN | BODY MASS INDEX: 38.49 KG/M2 | DIASTOLIC BLOOD PRESSURE: 108 MMHG | SYSTOLIC BLOOD PRESSURE: 164 MMHG

## 2023-01-16 DIAGNOSIS — Z12.31 SCREENING MAMMOGRAM FOR BREAST CANCER: ICD-10-CM

## 2023-01-16 DIAGNOSIS — N89.8 VAGINAL ODOR: ICD-10-CM

## 2023-01-16 DIAGNOSIS — I10 ESSENTIAL HYPERTENSION: ICD-10-CM

## 2023-01-16 DIAGNOSIS — Z01.419 ENCOUNTER FOR ANNUAL ROUTINE GYNECOLOGICAL EXAMINATION: Primary | ICD-10-CM

## 2023-01-16 DIAGNOSIS — R82.90 ABNORMAL URINE ODOR: ICD-10-CM

## 2023-01-16 DIAGNOSIS — N74 FEMALE PELVIC INFLAMMATORY DISORDERS IN DISEASES CLASSIFIED ELSEWHERE: ICD-10-CM

## 2023-01-16 DIAGNOSIS — R10.2 PELVIC CRAMPING: ICD-10-CM

## 2023-01-16 PROCEDURE — 3077F SYST BP >= 140 MM HG: CPT | Performed by: OBSTETRICS & GYNECOLOGY

## 2023-01-16 PROCEDURE — 3080F DIAST BP >= 90 MM HG: CPT | Performed by: OBSTETRICS & GYNECOLOGY

## 2023-01-16 PROCEDURE — G8484 FLU IMMUNIZE NO ADMIN: HCPCS | Performed by: OBSTETRICS & GYNECOLOGY

## 2023-01-16 PROCEDURE — 81003 URINALYSIS AUTO W/O SCOPE: CPT | Performed by: OBSTETRICS & GYNECOLOGY

## 2023-01-16 PROCEDURE — 99396 PREV VISIT EST AGE 40-64: CPT | Performed by: OBSTETRICS & GYNECOLOGY

## 2023-01-16 RX ORDER — CLINDAMYCIN PHOSPHATE 20 MG/G
1 CREAM VAGINAL NIGHTLY
Qty: 40 G | Refills: 5 | Status: SHIPPED | OUTPATIENT
Start: 2023-01-16 | End: 2023-01-21

## 2023-01-16 ASSESSMENT — PATIENT HEALTH QUESTIONNAIRE - PHQ9
1. LITTLE INTEREST OR PLEASURE IN DOING THINGS: 0
SUM OF ALL RESPONSES TO PHQ9 QUESTIONS 1 & 2: 1
SUM OF ALL RESPONSES TO PHQ QUESTIONS 1-9: 1
2. FEELING DOWN, DEPRESSED OR HOPELESS: 1
SUM OF ALL RESPONSES TO PHQ QUESTIONS 1-9: 1

## 2023-01-16 ASSESSMENT — ENCOUNTER SYMPTOMS
ALLERGIC/IMMUNOLOGIC NEGATIVE: 1
EYES NEGATIVE: 1
GASTROINTESTINAL NEGATIVE: 1
RESPIRATORY NEGATIVE: 1

## 2023-01-17 LAB
BACTERIA: ABNORMAL /HPF
BILIRUBIN URINE: NEGATIVE
BLOOD, URINE: NEGATIVE
C. TRACHOMATIS DNA ,URINE: NEGATIVE
CANDIDA SPECIES, DNA PROBE: ABNORMAL
CLARITY: ABNORMAL
COLOR: ABNORMAL
EPITHELIAL CELLS, UA: 3 /HPF (ref 0–5)
GARDNERELLA VAGINALIS, DNA PROBE: ABNORMAL
GLUCOSE URINE: NEGATIVE MG/DL
HYALINE CASTS: 0 /LPF (ref 0–8)
KETONES, URINE: ABNORMAL MG/DL
LEUKOCYTE ESTERASE, URINE: ABNORMAL
MICROSCOPIC EXAMINATION: YES
N. GONORRHOEAE DNA, URINE: NEGATIVE
NITRITE, URINE: NEGATIVE
PH UA: 6.5 (ref 5–8)
PROTEIN UA: 30 MG/DL
RBC UA: 2 /HPF (ref 0–4)
SPECIFIC GRAVITY UA: 1.03 (ref 1–1.03)
TRICHOMONAS VAGINALIS DNA: ABNORMAL
URINE REFLEX TO CULTURE: ABNORMAL
URINE TYPE: ABNORMAL
UROBILINOGEN, URINE: 1 E.U./DL
WBC UA: 6 /HPF (ref 0–5)

## 2023-01-18 ENCOUNTER — TELEPHONE (OUTPATIENT)
Dept: OBGYN | Age: 43
End: 2023-01-18

## 2023-01-18 NOTE — TELEPHONE ENCOUNTER
All of her labs are normal except for bacterial vaginosis for which we treated her on the date of her office visit

## 2023-01-24 DIAGNOSIS — Z13.220 LIPID SCREENING: ICD-10-CM

## 2023-01-24 DIAGNOSIS — I10 ESSENTIAL HYPERTENSION: ICD-10-CM

## 2023-01-24 LAB
A/G RATIO: 1.4 (ref 1.1–2.2)
ALBUMIN SERPL-MCNC: 4.2 G/DL (ref 3.4–5)
ALP BLD-CCNC: 81 U/L (ref 40–129)
ALT SERPL-CCNC: 17 U/L (ref 10–40)
ANION GAP SERPL CALCULATED.3IONS-SCNC: 11 MMOL/L (ref 3–16)
AST SERPL-CCNC: 19 U/L (ref 15–37)
BASOPHILS ABSOLUTE: 0 K/UL (ref 0–0.2)
BASOPHILS RELATIVE PERCENT: 0.3 %
BILIRUB SERPL-MCNC: 0.4 MG/DL (ref 0–1)
BUN BLDV-MCNC: 12 MG/DL (ref 7–20)
CALCIUM SERPL-MCNC: 9.5 MG/DL (ref 8.3–10.6)
CHLORIDE BLD-SCNC: 106 MMOL/L (ref 99–110)
CHOLESTEROL, TOTAL: 152 MG/DL (ref 0–199)
CO2: 25 MMOL/L (ref 21–32)
CREAT SERPL-MCNC: 0.8 MG/DL (ref 0.6–1.1)
EOSINOPHILS ABSOLUTE: 0.1 K/UL (ref 0–0.6)
EOSINOPHILS RELATIVE PERCENT: 1.1 %
GFR SERPL CREATININE-BSD FRML MDRD: >60 ML/MIN/{1.73_M2}
GLUCOSE BLD-MCNC: 90 MG/DL (ref 70–99)
HCT VFR BLD CALC: 36.8 % (ref 36–48)
HDLC SERPL-MCNC: 45 MG/DL (ref 40–60)
HEMOGLOBIN: 12.6 G/DL (ref 12–16)
LDL CHOLESTEROL CALCULATED: 92 MG/DL
LYMPHOCYTES ABSOLUTE: 1.7 K/UL (ref 1–5.1)
LYMPHOCYTES RELATIVE PERCENT: 26.5 %
MCH RBC QN AUTO: 28.2 PG (ref 26–34)
MCHC RBC AUTO-ENTMCNC: 34.2 G/DL (ref 31–36)
MCV RBC AUTO: 82.5 FL (ref 80–100)
MONOCYTES ABSOLUTE: 0.3 K/UL (ref 0–1.3)
MONOCYTES RELATIVE PERCENT: 4 %
NEUTROPHILS ABSOLUTE: 4.4 K/UL (ref 1.7–7.7)
NEUTROPHILS RELATIVE PERCENT: 68.1 %
PDW BLD-RTO: 13.9 % (ref 12.4–15.4)
PLATELET # BLD: 245 K/UL (ref 135–450)
PMV BLD AUTO: 9.3 FL (ref 5–10.5)
POTASSIUM SERPL-SCNC: 4 MMOL/L (ref 3.5–5.1)
RBC # BLD: 4.46 M/UL (ref 4–5.2)
SODIUM BLD-SCNC: 142 MMOL/L (ref 136–145)
TOTAL PROTEIN: 7.3 G/DL (ref 6.4–8.2)
TRIGL SERPL-MCNC: 74 MG/DL (ref 0–150)
VLDLC SERPL CALC-MCNC: 15 MG/DL
WBC # BLD: 6.5 K/UL (ref 4–11)

## 2023-01-26 ENCOUNTER — NURSE ONLY (OUTPATIENT)
Dept: OBGYN | Age: 43
End: 2023-01-26

## 2023-01-26 DIAGNOSIS — R10.2 PELVIC CRAMPING: ICD-10-CM

## 2023-01-26 LAB
HEMOCCULT SP2 STL QL: NORMAL
HEMOCCULT SP3 STL QL: NORMAL
OCCULT BLOOD DIAGNOSTIC: NORMAL

## 2023-02-14 ENCOUNTER — TELEPHONE (OUTPATIENT)
Dept: OBGYN | Age: 43
End: 2023-02-14

## 2023-02-14 ENCOUNTER — HOSPITAL ENCOUNTER (OUTPATIENT)
Dept: WOMENS IMAGING | Age: 43
Discharge: HOME | End: 2023-02-14
Payer: COMMERCIAL

## 2023-02-14 DIAGNOSIS — Z12.31 SCREENING MAMMOGRAM FOR BREAST CANCER: ICD-10-CM

## 2023-02-14 DIAGNOSIS — N76.0 BACTERIAL VAGINOSIS: Primary | ICD-10-CM

## 2023-02-14 DIAGNOSIS — B96.89 BACTERIAL VAGINOSIS: Primary | ICD-10-CM

## 2023-02-14 PROCEDURE — 77063 BREAST TOMOSYNTHESIS BI: CPT

## 2023-02-14 RX ORDER — METRONIDAZOLE 500 MG/1
500 TABLET ORAL 2 TIMES DAILY
Qty: 14 TABLET | Refills: 0 | Status: SHIPPED | OUTPATIENT
Start: 2023-02-14 | End: 2023-02-21

## 2023-02-14 NOTE — TELEPHONE ENCOUNTER
Pt was dx with Bacterial Vaginosis 1/18/2023. Pt called stating that she still has symptoms after completing Cleocin. Pt states she usually needs the Flagyl pills for it to clear. Pt requesting if we could send RX. Please advise.

## 2023-02-20 ENCOUNTER — OFFICE VISIT (OUTPATIENT)
Dept: FAMILY MEDICINE CLINIC | Age: 43
End: 2023-02-20
Payer: COMMERCIAL

## 2023-02-20 VITALS
SYSTOLIC BLOOD PRESSURE: 144 MMHG | WEIGHT: 249.6 LBS | HEIGHT: 68 IN | BODY MASS INDEX: 37.83 KG/M2 | HEART RATE: 84 BPM | OXYGEN SATURATION: 96 % | RESPIRATION RATE: 16 BRPM | DIASTOLIC BLOOD PRESSURE: 110 MMHG

## 2023-02-20 DIAGNOSIS — L70.0 CYSTIC ACNE: ICD-10-CM

## 2023-02-20 DIAGNOSIS — I10 ESSENTIAL HYPERTENSION: Primary | ICD-10-CM

## 2023-02-20 DIAGNOSIS — K81.0 ACUTE CHOLECYSTITIS: ICD-10-CM

## 2023-02-20 DIAGNOSIS — E66.01 MORBID OBESITY (HCC): ICD-10-CM

## 2023-02-20 DIAGNOSIS — K21.9 GASTROESOPHAGEAL REFLUX DISEASE, UNSPECIFIED WHETHER ESOPHAGITIS PRESENT: ICD-10-CM

## 2023-02-20 PROCEDURE — G8417 CALC BMI ABV UP PARAM F/U: HCPCS | Performed by: STUDENT IN AN ORGANIZED HEALTH CARE EDUCATION/TRAINING PROGRAM

## 2023-02-20 PROCEDURE — G8427 DOCREV CUR MEDS BY ELIG CLIN: HCPCS | Performed by: STUDENT IN AN ORGANIZED HEALTH CARE EDUCATION/TRAINING PROGRAM

## 2023-02-20 PROCEDURE — G8484 FLU IMMUNIZE NO ADMIN: HCPCS | Performed by: STUDENT IN AN ORGANIZED HEALTH CARE EDUCATION/TRAINING PROGRAM

## 2023-02-20 PROCEDURE — 3080F DIAST BP >= 90 MM HG: CPT | Performed by: STUDENT IN AN ORGANIZED HEALTH CARE EDUCATION/TRAINING PROGRAM

## 2023-02-20 PROCEDURE — 1036F TOBACCO NON-USER: CPT | Performed by: STUDENT IN AN ORGANIZED HEALTH CARE EDUCATION/TRAINING PROGRAM

## 2023-02-20 PROCEDURE — 3077F SYST BP >= 140 MM HG: CPT | Performed by: STUDENT IN AN ORGANIZED HEALTH CARE EDUCATION/TRAINING PROGRAM

## 2023-02-20 PROCEDURE — 99213 OFFICE O/P EST LOW 20 MIN: CPT | Performed by: STUDENT IN AN ORGANIZED HEALTH CARE EDUCATION/TRAINING PROGRAM

## 2023-02-20 RX ORDER — BACLOFEN 10 MG/1
10 TABLET ORAL 3 TIMES DAILY
COMMUNITY

## 2023-02-20 RX ORDER — TRETINOIN 0.5 MG/G
CREAM TOPICAL
Qty: 90 G | Refills: 2 | Status: SHIPPED | OUTPATIENT
Start: 2023-02-20 | End: 2023-03-22

## 2023-02-20 RX ORDER — CLINDAMYCIN AND BENZOYL PEROXIDE 10; 50 MG/G; MG/G
GEL TOPICAL
Qty: 50 G | Refills: 2 | Status: SHIPPED | OUTPATIENT
Start: 2023-02-20

## 2023-02-20 RX ORDER — PHENTERMINE HYDROCHLORIDE 37.5 MG/1
37.5 TABLET ORAL
Qty: 30 TABLET | Refills: 0 | Status: SHIPPED | OUTPATIENT
Start: 2023-02-20 | End: 2023-03-22

## 2023-02-20 ASSESSMENT — ENCOUNTER SYMPTOMS
ABDOMINAL PAIN: 0
WHEEZING: 0
SORE THROAT: 0
SHORTNESS OF BREATH: 0
NAUSEA: 0

## 2023-02-20 NOTE — PROGRESS NOTES
2/20/2023    [address]    Chief Complaint   Patient presents with    6 Month Follow-Up     No concerns today        HPI  History was obtained from 6 month follow up. Aleta Guardado is a 43 y.o. female with a PMHx as listed below who presents today for 6 month follow up. BP elevated did not take medicine  GERD not improving she declines medicine, rare cause. Weight trending down she has followed weight loss clinic    1. Essential hypertension    2. Cystic acne    3. Gastroesophageal reflux disease, unspecified whether esophagitis present    4. Morbid obesity ([address]    5. Acute cholecystitis             REVIEW OF SYMPTOMS    Review of Systems   Constitutional:  Negative for chills and fatigue. HENT:  Negative for congestion and sore throat. Respiratory:  Negative for shortness of breath and wheezing. Cardiovascular:  Negative for chest pain and palpitations. Gastrointestinal:  Negative for abdominal pain and nausea. Genitourinary:  Negative for frequency and urgency. Neurological:  Negative for light-headedness.      PAST MEDICAL HISTORY  Past Medical History:   Diagnosis Date    Excessive daytime sleepiness 12/13/2017    Fibromyalgia     GERD (gastroesophageal reflux disease)     Hernia of abdominal wall     Herpes simplex virus (HSV) infection     Hypertension     Hypertension     Morbid obesity ([address] 12/13/2017    Obesity     Vaginal discharge     Vaginal irritation     Vaginal odor        FAMILY HISTORY  Family History   Problem Relation Age of Onset    Hypertension Mother     Hypertension Father     Hearing Loss Brother     High Blood Pressure Maternal Grandmother     Arthritis Maternal Grandfather     Breast Cancer Neg Hx     Ovarian Cancer Neg Hx        SOCIAL HISTORY  Social History     Socioeconomic History    Marital status: Single   Tobacco Use    Smoking status: Never    Smokeless tobacco: Never   Vaping Use    Vaping Use: Never used   Substance and Sexual Activity    Alcohol use: Yes     Comment: social    Drug use: No    Sexual activity: Yes     Social Determinants of Health     Financial Resource Strain: Low Risk     Difficulty of Paying Living Expenses: Not hard at all   Food Insecurity: No Food Insecurity    Worried About [address] in the Last Year: Never true    [facility] in the Last Year: Never true   Transportation Needs: No Transportation Needs    Lack of Transportation (Medical): No    Lack of Transportation (Non-Medical): No        SURGICAL HISTORY  Past Surgical History:   Procedure Laterality Date    [address] & [address]    CHOLECYSTECTOMY  08/15/2018    laprascopic    COLONOSCOPY      DILATION AND CURETTAGE      ENDOMETRIAL ABLATION      HERNIA REPAIR Right 7/16/2021    ROBOTIC EXPLORATION, HERNIA RIGHT INGUINAL REPAIR AND VENTRAL HERNIA REPAIR LAPAROSCOPIC performed by Diego Valentin MD at [address] ([address] [facility])  2020    [facility]    OVARY REMOVAL Right     SALPINGECTOMY Bilateral     UPPER GASTROINTESTINAL ENDOSCOPY  09/11/2017                 CURRENT MEDICATIONS  Current Outpatient Medications   Medication Sig Dispense Refill    baclofen (LIORESAL) 10 MG tablet Take 10 mg by mouth 3 times daily      tretinoin (RETIN-A) 0.05 % cream Apply topically nightly. 90 g 2    clindamycin-benzoyl peroxide (BENZACLIN) 1-5 % gel Apply pea sized amount to effected area topically daily 50 g 2    losartan (COZAAR) 25 MG tablet Take 1 tablet by mouth daily 30 tablet 2    LINZESS 145 MCG capsule       phentermine (ADIPEX-P) 37.5 MG tablet Take 1 tablet by mouth every morning (before breakfast) for 30 days. Max Daily Amount: 37.5 mg 30 tablet 0    Acetaminophen (TYLENOL 8 HOUR PO) Take by mouth       No current facility-administered medications for this visit.        ALLERGIES  No Known Allergies    PHYSICAL EXAM    BP (!) 144/110 (Site: Left Upper Arm, Position: Sitting, Cuff Size: Large Adult) Comment: Hasn' taken medication yet this morning Pulse 84   Resp 16   Ht 5' 8\" (1.727 m)   Wt 249 lb 9.6 oz (113.2 kg)   LMP 06/15/2019   SpO2 96%   BMI 37.95 kg/m²     Physical Exam  Constitutional:       Appearance: Normal appearance. HENT:      Head: Normocephalic and atraumatic. Eyes:      Extraocular Movements: Extraocular movements intact. Pupils: Pupils are equal, round, and reactive to light. Cardiovascular:      Rate and Rhythm: Normal rate and regular rhythm. Pulses: Normal pulses. Heart sounds: No murmur heard. No friction rub. No gallop. Skin:     General: Skin is warm and dry. Neurological:      General: No focal deficit present. Mental Status: She is alert. Psychiatric:         Mood and Affect: Mood normal.         Behavior: Behavior normal.       ASSESSMENT & PLAN    1. Cystic acne    - tretinoin (RETIN-A) 0.05 % cream; Apply topically nightly. Dispense: 90 g; Refill: 2  - clindamycin-benzoyl peroxide (BENZACLIN) 1-5 % gel; Apply pea sized amount to effected area topically daily  Dispense: 50 g; Refill: 2    2. Essential hypertension      3. Gastroesophageal reflux disease, unspecified whether esophagitis present      4. Morbid obesity ([address]      5. Acute cholecystitis        Acne  Counsleed diet/exercise trending down we will start adipex  Bp elevated as did not take bp medications  TSH WNL    Return in about 6 months (around 8/20/2023).          Electronically signed by Emy Garsia DO on 2/20/2023

## 2023-03-21 ENCOUNTER — OFFICE VISIT (OUTPATIENT)
Dept: FAMILY MEDICINE CLINIC | Age: 43
End: 2023-03-21
Payer: COMMERCIAL

## 2023-03-21 VITALS
OXYGEN SATURATION: 98 % | HEART RATE: 89 BPM | BODY MASS INDEX: 36.22 KG/M2 | DIASTOLIC BLOOD PRESSURE: 79 MMHG | WEIGHT: 239 LBS | RESPIRATION RATE: 16 BRPM | HEIGHT: 68 IN | SYSTOLIC BLOOD PRESSURE: 128 MMHG

## 2023-03-21 DIAGNOSIS — E66.01 MORBID OBESITY (HCC): ICD-10-CM

## 2023-03-21 PROCEDURE — G8484 FLU IMMUNIZE NO ADMIN: HCPCS | Performed by: PHYSICIAN ASSISTANT

## 2023-03-21 PROCEDURE — 1036F TOBACCO NON-USER: CPT | Performed by: PHYSICIAN ASSISTANT

## 2023-03-21 PROCEDURE — 3074F SYST BP LT 130 MM HG: CPT | Performed by: PHYSICIAN ASSISTANT

## 2023-03-21 PROCEDURE — 99213 OFFICE O/P EST LOW 20 MIN: CPT | Performed by: PHYSICIAN ASSISTANT

## 2023-03-21 PROCEDURE — G8427 DOCREV CUR MEDS BY ELIG CLIN: HCPCS | Performed by: PHYSICIAN ASSISTANT

## 2023-03-21 PROCEDURE — G8417 CALC BMI ABV UP PARAM F/U: HCPCS | Performed by: PHYSICIAN ASSISTANT

## 2023-03-21 PROCEDURE — 3078F DIAST BP <80 MM HG: CPT | Performed by: PHYSICIAN ASSISTANT

## 2023-03-21 RX ORDER — PHENTERMINE HYDROCHLORIDE 37.5 MG/1
37.5 TABLET ORAL
Qty: 30 TABLET | Refills: 0 | Status: SHIPPED | OUTPATIENT
Start: 2023-03-21 | End: 2023-04-20

## 2023-03-21 NOTE — PROGRESS NOTES
Food Insecurity    Worried About Running Out of Food in the Last Year: Never true    Ran Out of Food in the Last Year: Never true   Transportation Needs: No Transportation Needs    Lack of Transportation (Medical): No    Lack of Transportation (Non-Medical): No        SURGICAL HISTORY  Past Surgical History:   Procedure Laterality Date    [address] & [address]    CHOLECYSTECTOMY  08/15/2018    laprascopic    COLONOSCOPY      DILATION AND CURETTAGE      ENDOMETRIAL ABLATION      HERNIA REPAIR Right 7/16/2021    ROBOTIC EXPLORATION, HERNIA RIGHT INGUINAL REPAIR AND VENTRAL HERNIA REPAIR LAPAROSCOPIC performed by Jarett Slaughter MD at [place] [address] ([facility])  2020    [facility]    OVARY REMOVAL Right     SALPINGECTOMY Bilateral     UPPER GASTROINTESTINAL ENDOSCOPY  09/11/2017                 CURRENT MEDICATIONS  Current Outpatient Medications   Medication Sig Dispense Refill    phentermine (ADIPEX-P) 37.5 MG tablet Take 1 tablet by mouth every morning (before breakfast) for 30 days. Max Daily Amount: 37.5 mg 30 tablet 0    baclofen (LIORESAL) 10 MG tablet Take 10 mg by mouth 3 times daily      tretinoin (RETIN-A) 0.05 % cream Apply topically nightly. 90 g 2    clindamycin-benzoyl peroxide (BENZACLIN) 1-5 % gel Apply pea sized amount to effected area topically daily 50 g 2    losartan (COZAAR) 25 MG tablet Take 1 tablet by mouth daily 30 tablet 2    LINZESS 145 MCG capsule       Acetaminophen (TYLENOL 8 HOUR PO) Take by mouth       No current facility-administered medications for this visit. ALLERGIES  No Known Allergies    PHYSICAL EXAM    /79   Pulse 89   Resp 16   Ht 5' 8\" (1.727 m)   Wt 239 lb (108.4 kg)   LMP 06/15/2019   SpO2 98%   BMI 36.34 kg/m²     Physical Exam  Constitutional:       Appearance: Normal appearance. She is obese. HENT:      Head: Normocephalic and atraumatic.       Right Ear: Tympanic membrane and external ear normal.      Left Ear: Tympanic

## 2023-05-01 ENCOUNTER — OFFICE VISIT (OUTPATIENT)
Dept: FAMILY MEDICINE CLINIC | Age: 43
End: 2023-05-01

## 2023-05-01 VITALS
RESPIRATION RATE: 16 BRPM | DIASTOLIC BLOOD PRESSURE: 110 MMHG | OXYGEN SATURATION: 99 % | SYSTOLIC BLOOD PRESSURE: 140 MMHG | WEIGHT: 242.7 LBS | BODY MASS INDEX: 36.78 KG/M2 | HEIGHT: 68 IN | HEART RATE: 79 BPM

## 2023-05-01 DIAGNOSIS — Z86.73 HISTORY OF TIA (TRANSIENT ISCHEMIC ATTACK): ICD-10-CM

## 2023-05-01 DIAGNOSIS — I10 ESSENTIAL HYPERTENSION: ICD-10-CM

## 2023-05-01 DIAGNOSIS — R55 SYNCOPE, UNSPECIFIED SYNCOPE TYPE: ICD-10-CM

## 2023-05-01 DIAGNOSIS — I10 ESSENTIAL HYPERTENSION: Primary | ICD-10-CM

## 2023-05-01 LAB
ANION GAP SERPL CALCULATED.3IONS-SCNC: 9 MMOL/L (ref 3–16)
BUN SERPL-MCNC: 12 MG/DL (ref 7–20)
CALCIUM SERPL-MCNC: 9.6 MG/DL (ref 8.3–10.6)
CHLORIDE SERPL-SCNC: 104 MMOL/L (ref 99–110)
CO2 SERPL-SCNC: 28 MMOL/L (ref 21–32)
CREAT SERPL-MCNC: 0.9 MG/DL (ref 0.6–1.1)
GFR SERPLBLD CREATININE-BSD FMLA CKD-EPI: >60 ML/MIN/{1.73_M2}
GLUCOSE SERPL-MCNC: 80 MG/DL (ref 70–99)
MAGNESIUM SERPL-MCNC: 1.9 MG/DL (ref 1.8–2.4)
POTASSIUM SERPL-SCNC: 3.9 MMOL/L (ref 3.5–5.1)
SODIUM SERPL-SCNC: 141 MMOL/L (ref 136–145)
TSH SERPL DL<=0.005 MIU/L-ACNC: 1.51 UIU/ML (ref 0.27–4.2)

## 2023-05-01 RX ORDER — LOSARTAN POTASSIUM AND HYDROCHLOROTHIAZIDE 12.5; 5 MG/1; MG/1
1 TABLET ORAL DAILY
Qty: 90 TABLET | Refills: 1 | Status: SHIPPED | OUTPATIENT
Start: 2023-05-01

## 2023-05-01 SDOH — ECONOMIC STABILITY: FOOD INSECURITY: WITHIN THE PAST 12 MONTHS, YOU WORRIED THAT YOUR FOOD WOULD RUN OUT BEFORE YOU GOT MONEY TO BUY MORE.: NEVER TRUE

## 2023-05-01 SDOH — ECONOMIC STABILITY: FOOD INSECURITY: WITHIN THE PAST 12 MONTHS, THE FOOD YOU BOUGHT JUST DIDN'T LAST AND YOU DIDN'T HAVE MONEY TO GET MORE.: NEVER TRUE

## 2023-05-01 SDOH — ECONOMIC STABILITY: HOUSING INSECURITY
IN THE LAST 12 MONTHS, WAS THERE A TIME WHEN YOU DID NOT HAVE A STEADY PLACE TO SLEEP OR SLEPT IN A SHELTER (INCLUDING NOW)?: NO

## 2023-05-01 SDOH — ECONOMIC STABILITY: INCOME INSECURITY: HOW HARD IS IT FOR YOU TO PAY FOR THE VERY BASICS LIKE FOOD, HOUSING, MEDICAL CARE, AND HEATING?: NOT HARD AT ALL

## 2023-05-01 ASSESSMENT — ENCOUNTER SYMPTOMS
WHEEZING: 0
NAUSEA: 0
SORE THROAT: 0
SHORTNESS OF BREATH: 0
ABDOMINAL PAIN: 0

## 2023-05-01 NOTE — PROGRESS NOTES
5/1/2023    [address]    Chief Complaint   Patient presents with    1 Month Follow-Up     Adipex - last fill was 03/21/23  Doesn't want to refill - said it made her heart fill funny. States she fell around the 16th of April. [place] funny right arm got tingling and not sure if she passed out. Said she slept the rest of the day. HPI  History was obtained from patient. Sherryl Primrose is a 37 y.o. female with a PMHx as listed below who presents today for complaint fall, syncopal episode    Patient feels she fell 16h April while walking up 1st step to house. Briefly passed out. She is not sure how long was less than a minute.     'my whole right side went out and become numb'. This event was unwitnessed. However she was on phone chat with cousin at the time. She notes was going through stressful time. Went to [facility] and had some right sided numbness. At home bp has been very 160/118 at home an other offices  1. Essential hypertension    2. History of TIA (transient ischemic attack)    3. Syncope, unspecified syncope type             REVIEW OF SYMPTOMS    Review of Systems   Constitutional:  Negative for chills and fatigue. HENT:  Negative for congestion and sore throat. Respiratory:  Negative for shortness of breath and wheezing. Cardiovascular:  Negative for chest pain and palpitations. Gastrointestinal:  Negative for abdominal pain and nausea. Genitourinary:  Negative for frequency and urgency. Neurological:  Negative for light-headedness.      PAST MEDICAL HISTORY  Past Medical History:   Diagnosis Date    Excessive daytime sleepiness 12/13/2017    Fibromyalgia     GERD (gastroesophageal reflux disease)     Hernia of abdominal wall     Herpes simplex virus (HSV) infection     Hypertension     Hypertension     Morbid obesity ([address] 12/13/2017    Obesity     Vaginal discharge     Vaginal irritation     Vaginal odor        FAMILY HISTORY  Family History   Problem Relation Age of Onset

## 2023-05-08 ENCOUNTER — TELEPHONE (OUTPATIENT)
Dept: FAMILY MEDICINE CLINIC | Age: 43
End: 2023-05-08

## 2023-05-08 DIAGNOSIS — I10 ESSENTIAL HYPERTENSION: Primary | ICD-10-CM

## 2023-05-08 RX ORDER — LOSARTAN POTASSIUM 50 MG/1
50 TABLET ORAL DAILY
Qty: 30 TABLET | Refills: 2 | Status: SHIPPED | OUTPATIENT
Start: 2023-05-08 | End: 2023-08-06

## 2023-05-08 NOTE — TELEPHONE ENCOUNTER
Patient called and stated that her BP med is no longer working. Losartan Hydrochlorothiazide. This med is giving patient leg cramps and unable to sleep at night. Call and advise.

## 2023-05-15 ENCOUNTER — NURSE ONLY (OUTPATIENT)
Dept: CARDIOLOGY CLINIC | Age: 43
End: 2023-05-15

## 2023-05-15 ENCOUNTER — INITIAL CONSULT (OUTPATIENT)
Dept: CARDIOLOGY CLINIC | Age: 43
End: 2023-05-15
Payer: COMMERCIAL

## 2023-05-15 VITALS
HEART RATE: 76 BPM | WEIGHT: 245 LBS | HEIGHT: 68 IN | DIASTOLIC BLOOD PRESSURE: 106 MMHG | BODY MASS INDEX: 37.13 KG/M2 | SYSTOLIC BLOOD PRESSURE: 148 MMHG

## 2023-05-15 DIAGNOSIS — I10 ESSENTIAL HYPERTENSION: Primary | ICD-10-CM

## 2023-05-15 DIAGNOSIS — M79.7 FIBROMYALGIA: ICD-10-CM

## 2023-05-15 DIAGNOSIS — E66.01 MORBID OBESITY (HCC): ICD-10-CM

## 2023-05-15 DIAGNOSIS — R29.898 RIGHT LEG WEAKNESS: ICD-10-CM

## 2023-05-15 DIAGNOSIS — R00.2 PALPITATIONS: ICD-10-CM

## 2023-05-15 DIAGNOSIS — R55 PRE-SYNCOPE: ICD-10-CM

## 2023-05-15 PROCEDURE — G8417 CALC BMI ABV UP PARAM F/U: HCPCS | Performed by: INTERNAL MEDICINE

## 2023-05-15 PROCEDURE — 3080F DIAST BP >= 90 MM HG: CPT | Performed by: INTERNAL MEDICINE

## 2023-05-15 PROCEDURE — 3077F SYST BP >= 140 MM HG: CPT | Performed by: INTERNAL MEDICINE

## 2023-05-15 PROCEDURE — 99244 OFF/OP CNSLTJ NEW/EST MOD 40: CPT | Performed by: INTERNAL MEDICINE

## 2023-05-15 PROCEDURE — G8428 CUR MEDS NOT DOCUMENT: HCPCS | Performed by: INTERNAL MEDICINE

## 2023-05-15 NOTE — PATIENT INSTRUCTIONS
Please be informed that if you contact our office outside of normal business hours the physician on call cannot help with any scheduling or rescheduling issues, procedure instruction questions or any type of medication issue. We advise you for any urgent/emergency that you go to the nearest emergency room! PLEASE CALL OUR OFFICE DURING NORMAL BUSINESS HOURS    Monday - Friday   8 am to 5 pm    [address]: [phone]    [place]:  [phone]  **It is YOUR responsibilty to bring medication bottles and/or updated medication list to [address] [place] [address]. This will allow us to better serve you and all your healthcare needs**  [facility] Locations - No appointment necessary. Sites open Monday to Friday. Call your preferred location for test preparation, business   hours and other information you need. [employer] accepts [employer]. [address] Elizabet Forman, [address] [facility]  Phone: [phone] [place]  [address] [facility]  [address] [place], [address] [facility]  Phone: [phone]     Thank you for allowing us to care for you today! We want to ensure we can follow your treatment plan and we strive to give you the best outcomes and experience possible. If you ever have a life threatening emergency and call 911 - for an ambulance (EMS)   Our providers can only care for you at:   [facility] or [facility]. Even if you have someone take you or you drive yourself we can only care for you in a [facility] facility. Our providers are not setup at the other healthcare locations! Please hold on to these instructions the  will call you within 1-9 business days when we receive authorization from your insurance. Echocardiogram    WHAT TO EXPECT:   This test will take approximately 45 minutes. It is an ultrasound of the heart.   It can look at the valves and chambers inside the heart   There is no special instructions for

## 2023-05-15 NOTE — PROGRESS NOTES
Vein \"LEG PROBLEM Questionnaire\"  Do you have prominent leg veins? No   Do you have any skin discoloration? No  Do you have any healed or active sores? No  Do you have swelling of the legs? Yes  Do you have a family history of varicose veins? No  Does your profession involve pro-longed        standing or heavy lifting? Yes  7. Have you been fighting overweight problems? Yes  8. Do you have restless legs? Yes  9. Do you have any night time cramps? Yes  10. Do you have any of the following in your legs:        Aching and weakness  11. If Yes - Have they worn compression stockings No  12.  If they have worn compression stockings
frequency     Vaginal discharge     Vaginal irritation     Vaginal odor      Past Surgical History:   Procedure Laterality Date    [address] & [address]    CHOLECYSTECTOMY  08/15/2018    laprascopic    COLONOSCOPY      DILATION AND CURETTAGE      ENDOMETRIAL ABLATION      HERNIA REPAIR Right 7/16/2021    ROBOTIC EXPLORATION, HERNIA RIGHT INGUINAL REPAIR AND VENTRAL HERNIA REPAIR LAPAROSCOPIC performed by Kim Turcios MD at [place] [address] (CERVIX STATUS UNKNOWN)  2020    [facility]    OVARY REMOVAL Right     SALPINGECTOMY Bilateral     UPPER GASTROINTESTINAL ENDOSCOPY  09/11/2017     Family History   Problem Relation Age of Onset    Hypertension Mother     Hypertension Father     Hearing Loss Brother     High Blood Pressure Maternal Grandmother     Arthritis Maternal Grandfather     Breast Cancer Neg Hx     Ovarian Cancer Neg Hx      Social History     Tobacco Use    Smoking status: Never    Smokeless tobacco: Never   Substance Use Topics    Alcohol use: Yes     Comment: social        Review of Systems:   Constitutional: No Fever or Weight Loss   Eyes: No Decreased Vision  ENT: No Headaches, Hearing Loss or Vertigo  Cardiovascular: as per note above   Respiratory: No cough or wheezing and as per note above.    Gastrointestinal: No abdominal pain, appetite loss, blood in stools, constipation, diarrhea or heartburn  Genitourinary: No dysuria, trouble voiding, or hematuria  Musculoskeletal:  denies any new  joint aches , swelling  or pain   Integumentary: No rash or pruritis  Neurological: No TIA or stroke symptoms  Psychiatric: No anxiety or depression  Endocrine: No malaise, fatigue or temperature intolerance  Hematologic/Lymphatic: No bleeding problems, blood clots or swollen lymph nodes  Allergic/Immunologic: No nasal congestion or hives    Objective:      Physical Exam:  BP (!) 148/106   Pulse 76   Ht 5' 8\" (1.727 m)   Wt 245 lb (111.1 kg)   LMP 06/15/2019   BMI 37.25 kg/m²   Wt Readings from

## 2023-05-15 NOTE — PROGRESS NOTES
7 days e-cardio monitor placed.  # [id number]. [id number] patient on how to record the event and to call monitoring center at [phone] if any problems arise. Instructed patient to disconnect the lead wires from the electrodes before bathing or showering and reattach them afterwards. Instructed patient that the electrodes should be changed every 3 days or if they no longer adhere to the skin. Patient to mail package after the monitor has ended. Patient verbalized understanding.   Curtis Gordillo placed and registered

## 2023-05-16 ENCOUNTER — OFFICE VISIT (OUTPATIENT)
Dept: OBGYN | Age: 43
End: 2023-05-16
Payer: COMMERCIAL

## 2023-05-16 VITALS
HEART RATE: 72 BPM | WEIGHT: 246.8 LBS | SYSTOLIC BLOOD PRESSURE: 163 MMHG | BODY MASS INDEX: 37.41 KG/M2 | DIASTOLIC BLOOD PRESSURE: 117 MMHG | HEIGHT: 68 IN

## 2023-05-16 DIAGNOSIS — R03.0 ELEVATED BLOOD PRESSURE READING: ICD-10-CM

## 2023-05-16 DIAGNOSIS — R30.0 BURNING WITH URINATION: Primary | ICD-10-CM

## 2023-05-16 DIAGNOSIS — R35.0 URINARY FREQUENCY: ICD-10-CM

## 2023-05-16 LAB
BILIRUBIN, POC: NORMAL
BLOOD URINE, POC: NORMAL
CLARITY, POC: NORMAL
COLOR, POC: NORMAL
GLUCOSE URINE, POC: NORMAL
KETONES, POC: NORMAL
LEUKOCYTE EST, POC: NORMAL
NITRITE, POC: NORMAL
PH, POC: 6
PROTEIN, POC: NORMAL
SPECIFIC GRAVITY, POC: 1.02
UROBILINOGEN, POC: NORMAL

## 2023-05-16 PROCEDURE — G8417 CALC BMI ABV UP PARAM F/U: HCPCS | Performed by: OBSTETRICS & GYNECOLOGY

## 2023-05-16 PROCEDURE — 3077F SYST BP >= 140 MM HG: CPT | Performed by: OBSTETRICS & GYNECOLOGY

## 2023-05-16 PROCEDURE — 99214 OFFICE O/P EST MOD 30 MIN: CPT | Performed by: OBSTETRICS & GYNECOLOGY

## 2023-05-16 PROCEDURE — 81002 URINALYSIS NONAUTO W/O SCOPE: CPT | Performed by: OBSTETRICS & GYNECOLOGY

## 2023-05-16 PROCEDURE — 1036F TOBACCO NON-USER: CPT | Performed by: OBSTETRICS & GYNECOLOGY

## 2023-05-16 PROCEDURE — G8427 DOCREV CUR MEDS BY ELIG CLIN: HCPCS | Performed by: OBSTETRICS & GYNECOLOGY

## 2023-05-16 PROCEDURE — 3080F DIAST BP >= 90 MM HG: CPT | Performed by: OBSTETRICS & GYNECOLOGY

## 2023-05-16 RX ORDER — NITROFURANTOIN 25; 75 MG/1; MG/1
100 CAPSULE ORAL 2 TIMES DAILY
Qty: 14 CAPSULE | Refills: 0 | Status: SHIPPED | OUTPATIENT
Start: 2023-05-16 | End: 2023-05-23

## 2023-05-16 RX ORDER — PHENAZOPYRIDINE HYDROCHLORIDE 200 MG/1
200 TABLET, FILM COATED ORAL 3 TIMES DAILY
Qty: 9 TABLET | Refills: 0 | Status: SHIPPED | OUTPATIENT
Start: 2023-05-16 | End: 2023-05-19

## 2023-05-16 NOTE — ASSESSMENT & PLAN NOTE
Unclear etiology probablyExacerbated by stress we will get a monitor 30-day also get treadmill to unmask any arrhythmia get echo to rule out structural heart disease

## 2023-05-16 NOTE — ASSESSMENT & PLAN NOTE
She describes the event of right arm and leg getting weak that she felt lightheaded and weak and dizzy but did not really pass out we will get a monitor

## 2023-05-16 NOTE — PROGRESS NOTES
5/16/23    Mahoganykirsten Batista [address]  1980    Chief Complaint   Patient presents with    Other     Pt c/o urinary frequency and burning with urination x 3 days. Soumya To is a 37 y.o. female who presents today for evaluation of urinary frequency and dysuria. Voids small amoutn, burns when urinates,  + urgency.   + odor to urine    Past Medical History:   Diagnosis Date    Burning with urination     Excessive daytime sleepiness 12/13/2017    Fibromyalgia     GERD (gastroesophageal reflux disease)     Hernia of abdominal wall     Herpes simplex virus (HSV) infection     Hypertension     Hypertension     Morbid obesity ([address] 12/13/2017    Obesity     Urinary frequency     Vaginal discharge     Vaginal irritation     Vaginal odor        Past Surgical History:   Procedure Laterality Date    [address]    CHOLECYSTECTOMY  08/15/2018    laprascopic    COLONOSCOPY      DILATION AND CURETTAGE      ENDOMETRIAL ABLATION      HERNIA REPAIR Right 7/16/2021    ROBOTIC EXPLORATION, HERNIA RIGHT INGUINAL REPAIR AND VENTRAL HERNIA REPAIR LAPAROSCOPIC performed by Simran Mattson MD at [address])  2020    [facility]    OVARY REMOVAL Right     SALPINGECTOMY Bilateral     UPPER GASTROINTESTINAL ENDOSCOPY  09/11/2017       Social History     Tobacco Use    Smoking status: Never    Smokeless tobacco: Never   Vaping Use    Vaping Use: Never used   Substance Use Topics    Alcohol use: Yes     Comment: social    Drug use: No       Family History   Problem Relation Age of Onset    Hypertension Mother     Hypertension Father     Hearing Loss Brother     High Blood Pressure Maternal Grandmother     Arthritis Maternal Grandfather     Breast Cancer Neg Hx     Ovarian Cancer Neg Hx        Current Outpatient Medications   Medication Sig Dispense Refill    nitrofurantoin, macrocrystal-monohydrate, (MACROBID) 100 MG capsule Take 1 capsule by mouth 2 times daily for 7 days 14

## 2023-05-16 NOTE — ASSESSMENT & PLAN NOTE
Blood pressures is elevated today get echo to look for LVH continue losartan 50 mg daily encouraged to check blood pressure at home watch salt intake get echo

## 2023-05-18 LAB
C TRACH DNA UR QL NAA+PROBE: NEGATIVE
N GONORRHOEA DNA UR QL NAA+PROBE: NEGATIVE

## 2023-05-19 ENCOUNTER — HOSPITAL ENCOUNTER (OUTPATIENT)
Dept: MRI IMAGING | Age: 43
Discharge: HOME | End: 2023-05-19
Payer: COMMERCIAL

## 2023-05-19 DIAGNOSIS — Z86.73 HISTORY OF TIA (TRANSIENT ISCHEMIC ATTACK): ICD-10-CM

## 2023-05-19 LAB
BACTERIA UR CULT: ABNORMAL
ORGANISM: ABNORMAL

## 2023-05-19 PROCEDURE — 70551 MRI BRAIN STEM W/O DYE: CPT

## 2023-05-19 RX ORDER — AMPICILLIN 500 MG/1
500 CAPSULE ORAL 4 TIMES DAILY
Qty: 28 CAPSULE | Refills: 0 | Status: SHIPPED | OUTPATIENT
Start: 2023-05-19 | End: 2023-05-26

## 2023-05-23 ENCOUNTER — PROCEDURE VISIT (OUTPATIENT)
Dept: CARDIOLOGY CLINIC | Age: 43
End: 2023-05-23

## 2023-05-23 ENCOUNTER — TELEPHONE (OUTPATIENT)
Dept: FAMILY MEDICINE CLINIC | Age: 43
End: 2023-05-23

## 2023-05-23 DIAGNOSIS — R00.2 PALPITATIONS: ICD-10-CM

## 2023-05-23 DIAGNOSIS — R29.898 RIGHT LEG WEAKNESS: ICD-10-CM

## 2023-05-23 DIAGNOSIS — R55 PRE-SYNCOPE: ICD-10-CM

## 2023-05-23 DIAGNOSIS — I10 ESSENTIAL HYPERTENSION: ICD-10-CM

## 2023-05-23 DIAGNOSIS — M79.7 FIBROMYALGIA: ICD-10-CM

## 2023-05-23 DIAGNOSIS — I10 ESSENTIAL HYPERTENSION: Primary | ICD-10-CM

## 2023-05-23 LAB
LV EF: 53 %
LVEF MODALITY: NORMAL

## 2023-05-23 NOTE — TELEPHONE ENCOUNTER
Patient called and stated that she wants to speak to the provider about her BP medication. The Losartan is causing her body to hurt patient wants a medication for BP that has no potassium in it at all.

## 2023-05-24 ENCOUNTER — TELEPHONE (OUTPATIENT)
Dept: FAMILY MEDICINE CLINIC | Age: 43
End: 2023-05-24

## 2023-05-24 DIAGNOSIS — I10 ESSENTIAL HYPERTENSION: Primary | ICD-10-CM

## 2023-05-24 RX ORDER — CHLORTHALIDONE 25 MG/1
25 TABLET ORAL DAILY
Qty: 30 TABLET | Refills: 3 | Status: SHIPPED | OUTPATIENT
Start: 2023-05-24

## 2023-05-24 NOTE — TELEPHONE ENCOUNTER
Spoke with patient BP still very high can we please try to scheudle appt next week any provider, same day visit ok for elevated BP. Start on chlorthalidone, she is continuing losartan for now. Unable to tolerate amlodipine. May have to consider bb/clonidine need to further discuss at appt.

## 2023-05-24 NOTE — PROGRESS NOTES
Patient here for GXT. BP checked prior to start of test and was 156/116. BP rechecked and was 156/112. Per Dr. Jaden Cuba, cancel test for today. Have patient take double dosage of Losartan. Patient advised to take Losartan 50 mg. Two tablets at least 1 hour prior to stress test. Patient voiced understanding.

## 2023-05-25 ENCOUNTER — TELEPHONE (OUTPATIENT)
Dept: FAMILY MEDICINE CLINIC | Age: 43
End: 2023-05-25

## 2023-05-25 NOTE — TELEPHONE ENCOUNTER
Called pt per Dr. Nayak Rather , understanding voiced . Pt is scheduled with Manasa Cantor for 5-30-23 . Please see other note .

## 2023-05-25 NOTE — TELEPHONE ENCOUNTER
----- Message from Nia Wheeler DO sent at 5/24/2023  1:36 PM EDT -----  Please elizabeths patient MRI normal will call her tonight to discuss bp medications

## 2023-05-31 ENCOUNTER — PROCEDURE VISIT (OUTPATIENT)
Dept: CARDIOLOGY CLINIC | Age: 43
End: 2023-05-31
Payer: COMMERCIAL

## 2023-05-31 ENCOUNTER — TELEPHONE (OUTPATIENT)
Dept: CARDIOLOGY CLINIC | Age: 43
End: 2023-05-31

## 2023-05-31 ENCOUNTER — OFFICE VISIT (OUTPATIENT)
Dept: FAMILY MEDICINE CLINIC | Age: 43
End: 2023-05-31
Payer: COMMERCIAL

## 2023-05-31 VITALS
HEIGHT: 68 IN | DIASTOLIC BLOOD PRESSURE: 102 MMHG | HEART RATE: 84 BPM | OXYGEN SATURATION: 97 % | WEIGHT: 240.6 LBS | BODY MASS INDEX: 36.46 KG/M2 | SYSTOLIC BLOOD PRESSURE: 150 MMHG

## 2023-05-31 DIAGNOSIS — R55 PRE-SYNCOPE: ICD-10-CM

## 2023-05-31 DIAGNOSIS — R00.2 PALPITATIONS: ICD-10-CM

## 2023-05-31 DIAGNOSIS — I10 UNCONTROLLED HYPERTENSION: Primary | ICD-10-CM

## 2023-05-31 DIAGNOSIS — I10 ESSENTIAL HYPERTENSION: ICD-10-CM

## 2023-05-31 DIAGNOSIS — K58.1 IRRITABLE BOWEL SYNDROME WITH CONSTIPATION: ICD-10-CM

## 2023-05-31 DIAGNOSIS — I10 ESSENTIAL HYPERTENSION: Primary | ICD-10-CM

## 2023-05-31 PROCEDURE — 3080F DIAST BP >= 90 MM HG: CPT | Performed by: PHYSICIAN ASSISTANT

## 2023-05-31 PROCEDURE — 93015 CV STRESS TEST SUPVJ I&R: CPT | Performed by: INTERNAL MEDICINE

## 2023-05-31 PROCEDURE — G8427 DOCREV CUR MEDS BY ELIG CLIN: HCPCS | Performed by: PHYSICIAN ASSISTANT

## 2023-05-31 PROCEDURE — G8417 CALC BMI ABV UP PARAM F/U: HCPCS | Performed by: PHYSICIAN ASSISTANT

## 2023-05-31 PROCEDURE — 1036F TOBACCO NON-USER: CPT | Performed by: PHYSICIAN ASSISTANT

## 2023-05-31 PROCEDURE — 3077F SYST BP >= 140 MM HG: CPT | Performed by: PHYSICIAN ASSISTANT

## 2023-05-31 PROCEDURE — 99215 OFFICE O/P EST HI 40 MIN: CPT | Performed by: PHYSICIAN ASSISTANT

## 2023-05-31 RX ORDER — ETODOLAC 500 MG/1
1 TABLET, FILM COATED ORAL 2 TIMES DAILY
COMMUNITY
Start: 2023-05-23

## 2023-05-31 NOTE — PROGRESS NOTES
Sexual activity: Yes     Social Determinants of Health     Financial Resource Strain: Low Risk     Difficulty of Paying Living Expenses: Not hard at all   Food Insecurity: No Food Insecurity    Worried About [address] in the Last Year: Never true    [facility] in the Last Year: Never true   Transportation Needs: No Transportation Needs    Lack of Transportation (Medical): No    Lack of Transportation (Non-Medical): No        SURGICAL HISTORY  Past Surgical History:   Procedure Laterality Date    [address]    CHOLECYSTECTOMY  08/15/2018    laprascopic    COLONOSCOPY      DILATION AND CURETTAGE      ENDOMETRIAL ABLATION      HERNIA REPAIR Right 7/16/2021    ROBOTIC EXPLORATION, HERNIA RIGHT INGUINAL REPAIR AND VENTRAL HERNIA REPAIR LAPAROSCOPIC performed by Ammy Graham MD at [address] [place] [address] ([address] [facility] [address])  2020    [facility]    OVARY REMOVAL Right     SALPINGECTOMY Bilateral     UPPER GASTROINTESTINAL ENDOSCOPY  09/11/2017       CURRENT MEDICATIONS  Current Outpatient Medications   Medication Sig Dispense Refill    etodolac (LODINE) 500 MG tablet Take 1 tablet by mouth in the morning and at bedtime      linaclotide (LINZESS) 145 MCG capsule Take 1 capsule by mouth every morning (before breakfast) 30 capsule 2    baclofen (LIORESAL) 10 MG tablet Take 1 tablet by mouth 3 times daily      clindamycin-benzoyl peroxide (BENZACLIN) 1-5 % gel Apply pea sized amount to effected area topically daily 50 g 2    chlorthalidone (HYGROTON) 25 MG tablet Take 1 tablet by mouth daily 30 tablet 3    Acetaminophen (TYLENOL 8 HOUR PO) Take by mouth (Patient not taking: Reported on 5/15/2023)       No current facility-administered medications for this visit.        ALLERGIES  No Known Allergies    PHYSICAL EXAM    BP (!) 150/102   Pulse 84   Ht 5' 8\" (1.727 m)   Wt 240 lb 9.6 oz (109.1 kg)   LMP 06/15/2019   SpO2 97%   BMI 36.58 kg/m²     Constitutional:  Well developed, well

## 2023-06-19 ENCOUNTER — TELEPHONE (OUTPATIENT)
Dept: OBGYN | Age: 43
End: 2023-06-19

## 2023-06-19 RX ORDER — METRONIDAZOLE 500 MG/1
500 TABLET ORAL 2 TIMES DAILY
Qty: 14 TABLET | Refills: 0 | Status: SHIPPED | OUTPATIENT
Start: 2023-06-19 | End: 2023-06-26

## 2023-06-19 NOTE — TELEPHONE ENCOUNTER
Pt states she has reoccurring BV. Pt would like to know if Flagyl can be called in. Pt state she has the same sx she always gets w/ BV. Please advise.

## 2023-07-18 ENCOUNTER — OFFICE VISIT (OUTPATIENT)
Dept: CARDIOLOGY CLINIC | Age: 43
End: 2023-07-18
Payer: COMMERCIAL

## 2023-07-18 VITALS
DIASTOLIC BLOOD PRESSURE: 86 MMHG | SYSTOLIC BLOOD PRESSURE: 118 MMHG | OXYGEN SATURATION: 95 % | WEIGHT: 244.4 LBS | BODY MASS INDEX: 37.04 KG/M2 | HEIGHT: 68 IN | HEART RATE: 84 BPM

## 2023-07-18 DIAGNOSIS — R35.0 URINARY FREQUENCY: ICD-10-CM

## 2023-07-18 DIAGNOSIS — R55 PRE-SYNCOPE: ICD-10-CM

## 2023-07-18 DIAGNOSIS — K81.0 ACUTE CHOLECYSTITIS: ICD-10-CM

## 2023-07-18 DIAGNOSIS — I10 ESSENTIAL HYPERTENSION: Primary | ICD-10-CM

## 2023-07-18 DIAGNOSIS — M79.7 FIBROMYALGIA: ICD-10-CM

## 2023-07-18 DIAGNOSIS — R00.2 PALPITATIONS: ICD-10-CM

## 2023-07-18 PROCEDURE — 3079F DIAST BP 80-89 MM HG: CPT | Performed by: INTERNAL MEDICINE

## 2023-07-18 PROCEDURE — G8427 DOCREV CUR MEDS BY ELIG CLIN: HCPCS | Performed by: INTERNAL MEDICINE

## 2023-07-18 PROCEDURE — 3074F SYST BP LT 130 MM HG: CPT | Performed by: INTERNAL MEDICINE

## 2023-07-18 PROCEDURE — 99214 OFFICE O/P EST MOD 30 MIN: CPT | Performed by: INTERNAL MEDICINE

## 2023-07-18 PROCEDURE — 1036F TOBACCO NON-USER: CPT | Performed by: INTERNAL MEDICINE

## 2023-07-18 PROCEDURE — G8417 CALC BMI ABV UP PARAM F/U: HCPCS | Performed by: INTERNAL MEDICINE

## 2023-07-18 NOTE — PROGRESS NOTES
CARDIOLOGY  NOTE    Chief Complaint: Presyncope right-sided weakness    HPI:   Willie Mcgee is a 37y.o. year old who has Past medical history as noted below. She had an episode  weakness and numbness on the right side which was started out suddenly when she was having a very emotional situation ; she felt lightheaded and dizzy and then after getting home slept all day till next in the morning  Her blood pressure was very high she felt her right arm and leg was weak. 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 She has history of hypertension fibromyalgia so gest ankle swelling and knee swelling     Current Outpatient Medications   Medication Sig Dispense Refill    etodolac (LODINE) 500 MG tablet Take 1 tablet by mouth in the morning and at bedtime      linaclotide (LINZESS) 145 MCG capsule Take 1 capsule by mouth every morning (before breakfast) 30 capsule 2    chlorthalidone (HYGROTON) 25 MG tablet Take 1 tablet by mouth daily 30 tablet 3    baclofen (LIORESAL) 10 MG tablet Take 1 tablet by mouth 3 times daily      clindamycin-benzoyl peroxide (BENZACLIN) 1-5 % gel Apply pea sized amount to effected area topically daily 50 g 2    Acetaminophen (TYLENOL 8 HOUR PO) Take by mouth (Patient not taking: Reported on 5/15/2023)       No current facility-administered medications for this visit. Allergies:   Patient has no known allergies.     Patient History:  Past Medical History:   Diagnosis Date    Burning with urination     Excessive daytime sleepiness 12/13/2017    Fibromyalgia     GERD (gastroesophageal reflux disease)     Hernia of abdominal wall     Herpes simplex virus (HSV) infection     Hypertension     Hypertension     Morbid

## 2023-07-18 NOTE — PATIENT INSTRUCTIONS
Please be informed that if you contact our office outside of normal business hours the physician on call cannot help with any scheduling or rescheduling issues, procedure instruction questions or any type of medication issue. We advise you for any urgent/emergency that you go to the nearest emergency room! PLEASE CALL OUR OFFICE DURING NORMAL BUSINESS HOURS    Monday - Friday   8 am to 5 pm    [place]: [address] [place]: [phone]    [place]:  [phone]    **It is YOUR responsibilty to bring medication bottles and/or updated medication list to [address] [place] [address]. This will allow us to better serve you and all your healthcare needs**    [facility] Locations - No appointment necessary. Sites open Monday to Friday. Call your preferred location for test preparation, business   hours and other information you need. [employer]. [address] [place] [address]. 27 . Deepa Cotton. [place], [address] [facility]  Phone: [phone]     Thank you for allowing us to care for you today! We want to ensure we can follow your treatment plan and we strive to give you the best outcomes and experience possible. If you ever have a life threatening emergency and call 911 - for an ambulance (EMS)   Our providers can only care for you at:   [facility] or [facility]. Even if you have someone take you or you drive yourself we can only care for you in a [facility] facility. Our providers are not setup at the other healthcare locations! We are committed to providing you the best care possible. If you receive a survey after visiting one of our offices, please take time to share your experience concerning your physician office visit. These surveys are confidential and no health information about you is shared. We are eager to improve for you and we are counting on your feedback to help make that happen.

## 2023-07-25 ENCOUNTER — OFFICE VISIT (OUTPATIENT)
Dept: OBGYN | Age: 43
End: 2023-07-25
Payer: COMMERCIAL

## 2023-07-25 VITALS
BODY MASS INDEX: 36.53 KG/M2 | DIASTOLIC BLOOD PRESSURE: 89 MMHG | SYSTOLIC BLOOD PRESSURE: 130 MMHG | HEIGHT: 68 IN | WEIGHT: 241 LBS

## 2023-07-25 DIAGNOSIS — N74 FEMALE PELVIC INFLAMMATORY DISORDERS IN DISEASES CLASSIFIED ELSEWHERE: ICD-10-CM

## 2023-07-25 DIAGNOSIS — R39.89 ABNORMAL URINE COLOR: Primary | ICD-10-CM

## 2023-07-25 DIAGNOSIS — L02.231: ICD-10-CM

## 2023-07-25 DIAGNOSIS — N89.8 VAGINAL DISCHARGE: ICD-10-CM

## 2023-07-25 PROCEDURE — 3075F SYST BP GE 130 - 139MM HG: CPT | Performed by: OBSTETRICS & GYNECOLOGY

## 2023-07-25 PROCEDURE — 1036F TOBACCO NON-USER: CPT | Performed by: OBSTETRICS & GYNECOLOGY

## 2023-07-25 PROCEDURE — 3079F DIAST BP 80-89 MM HG: CPT | Performed by: OBSTETRICS & GYNECOLOGY

## 2023-07-25 PROCEDURE — G8417 CALC BMI ABV UP PARAM F/U: HCPCS | Performed by: OBSTETRICS & GYNECOLOGY

## 2023-07-25 PROCEDURE — 99214 OFFICE O/P EST MOD 30 MIN: CPT | Performed by: OBSTETRICS & GYNECOLOGY

## 2023-07-25 PROCEDURE — 81003 URINALYSIS AUTO W/O SCOPE: CPT | Performed by: OBSTETRICS & GYNECOLOGY

## 2023-07-25 PROCEDURE — G8427 DOCREV CUR MEDS BY ELIG CLIN: HCPCS | Performed by: OBSTETRICS & GYNECOLOGY

## 2023-07-25 RX ORDER — CEPHALEXIN 500 MG/1
500 CAPSULE ORAL 4 TIMES DAILY
Qty: 28 CAPSULE | Refills: 0 | Status: SHIPPED | OUTPATIENT
Start: 2023-07-25 | End: 2023-08-01

## 2023-07-25 RX ORDER — CLINDAMYCIN PHOSPHATE 20 MG/G
1 CREAM VAGINAL NIGHTLY
Qty: 40 G | Refills: 2 | Status: SHIPPED | OUTPATIENT
Start: 2023-07-25 | End: 2023-07-30

## 2023-07-25 NOTE — PROGRESS NOTES
7/25/23    Avi Cockayne N Vic  1980    Chief Complaint   Patient presents with    Vaginal Odor     C/o vaginal odor x 3 weeks. Pt took flagyl and sx persist. Pt denies itching or discharge. Other     Pt c/o blister that had opened near c/s scar. Pt requesting to have it checked.          Tevin Meyer is a 37 y.o. female who presents today for evaluation of vaginal discharge, abnomormal urinary color, \"boil\" on csection incision and buttocks    Past Medical History:   Diagnosis Date    Burning with urination     Excessive daytime sleepiness 12/13/2017    Fibromyalgia     GERD (gastroesophageal reflux disease)     Hernia of abdominal wall     Herpes simplex virus (HSV) infection     Hypertension     Hypertension     Morbid obesity ([address]) 12/13/2017    Obesity     Urinary frequency     Vaginal discharge     Vaginal irritation     Vaginal odor        Past Surgical History:   Procedure Laterality Date    [address] & 2014    CHOLECYSTECTOMY  08/15/2018    laprascopic    COLONOSCOPY      DILATION AND CURETTAGE      ENDOMETRIAL ABLATION      HERNIA REPAIR Right 7/16/2021    ROBOTIC EXPLORATION, HERNIA RIGHT INGUINAL REPAIR AND VENTRAL HERNIA REPAIR LAPAROSCOPIC performed by Mary Jo Miller MD at [address] ([address])  2020    LAV    OVARY REMOVAL Right     SALPINGECTOMY Bilateral     UPPER GASTROINTESTINAL ENDOSCOPY  09/11/2017       Social History     Tobacco Use    Smoking status: Never    Smokeless tobacco: Never   Vaping Use    Vaping Use: Never used   Substance Use Topics    Alcohol use: Yes     Comment: social. caffeine: 1 soda a week    Drug use: No       Family History   Problem Relation Age of Onset    Hypertension Mother     Hypertension Father     Hearing Loss Brother     High Blood Pressure Maternal Grandmother     Arthritis Maternal Grandfather     Breast Cancer Neg Hx     Ovarian Cancer Neg Hx        Current Outpatient Medications   Medication Sig

## 2023-07-26 LAB
AMORPHOUS: PRESENT
BACTERIA URNS QL MICRO: ABNORMAL /HPF
BILIRUB UR QL STRIP.AUTO: NEGATIVE
C TRACH DNA CVX QL NAA+PROBE: NEGATIVE
CANDIDA DNA VAG QL NAA+PROBE: NORMAL
CLARITY UR: ABNORMAL
COLOR UR: YELLOW
EPI CELLS #/AREA URNS AUTO: 9 /HPF (ref 0–5)
G VAGINALIS DNA SPEC QL NAA+PROBE: NORMAL
GLUCOSE UR STRIP.AUTO-MCNC: NEGATIVE MG/DL
HGB UR QL STRIP.AUTO: NEGATIVE
HYALINE CASTS #/AREA URNS AUTO: 7 /LPF (ref 0–8)
KETONES UR STRIP.AUTO-MCNC: NEGATIVE MG/DL
LEUKOCYTE ESTERASE UR QL STRIP.AUTO: ABNORMAL
MUCUS: PRESENT
N GONORRHOEA DNA CERV MUCUS QL NAA+PROBE: NEGATIVE
NITRITE UR QL STRIP.AUTO: NEGATIVE
PH UR STRIP.AUTO: 6 [PH] (ref 5–8)
PROT UR STRIP.AUTO-MCNC: NEGATIVE MG/DL
RBC CLUMPS #/AREA URNS AUTO: 13 /HPF (ref 0–4)
REASON FOR REJECTION: NORMAL
REJECTED TEST: NORMAL
SP GR UR STRIP.AUTO: 1.02 (ref 1–1.03)
T VAGINALIS DNA VAG QL NAA+PROBE: NORMAL
UA COMPLETE W REFLEX CULTURE PNL UR: YES
UA DIPSTICK W REFLEX MICRO PNL UR: YES
URN SPEC COLLECT METH UR: ABNORMAL
UROBILINOGEN UR STRIP-ACNC: 0.2 E.U./DL
WBC #/AREA URNS AUTO: 37 /HPF (ref 0–5)

## 2023-07-26 RX ORDER — NITROFURANTOIN 25; 75 MG/1; MG/1
100 CAPSULE ORAL 2 TIMES DAILY
Qty: 14 CAPSULE | Refills: 0 | Status: SHIPPED | OUTPATIENT
Start: 2023-07-26 | End: 2023-08-02

## 2023-07-27 ENCOUNTER — HOSPITAL ENCOUNTER (EMERGENCY)
Age: 43
Discharge: HOME | End: 2023-07-27
Attending: EMERGENCY MEDICINE
Payer: COMMERCIAL

## 2023-07-27 VITALS
BODY MASS INDEX: 35.92 KG/M2 | WEIGHT: 237 LBS | HEART RATE: 82 BPM | OXYGEN SATURATION: 95 % | SYSTOLIC BLOOD PRESSURE: 128 MMHG | TEMPERATURE: 97.3 F | DIASTOLIC BLOOD PRESSURE: 95 MMHG | HEIGHT: 68 IN | RESPIRATION RATE: 21 BRPM

## 2023-07-27 DIAGNOSIS — E87.6 HYPOKALEMIA: ICD-10-CM

## 2023-07-27 DIAGNOSIS — R00.2 PALPITATIONS: Primary | ICD-10-CM

## 2023-07-27 LAB
ANION GAP SERPL CALCULATED.3IONS-SCNC: 10 MMOL/L (ref 4–16)
BASOPHILS ABSOLUTE: 0 K/CU MM
BASOPHILS RELATIVE PERCENT: 0.4 % (ref 0–1)
BUN SERPL-MCNC: 15 MG/DL (ref 6–23)
CALCIUM SERPL-MCNC: 10 MG/DL (ref 8.3–10.6)
CHLORIDE BLD-SCNC: 100 MMOL/L (ref 99–110)
CO2: 30 MMOL/L (ref 21–32)
CREAT SERPL-MCNC: 1.1 MG/DL (ref 0.6–1.1)
DIFFERENTIAL TYPE: ABNORMAL
EKG ATRIAL RATE: 79 BPM
EKG DIAGNOSIS: NORMAL
EKG P AXIS: 37 DEGREES
EKG P-R INTERVAL: 170 MS
EKG Q-T INTERVAL: 354 MS
EKG QRS DURATION: 82 MS
EKG QTC CALCULATION (BAZETT): 405 MS
EKG R AXIS: -5 DEGREES
EKG T AXIS: 14 DEGREES
EKG VENTRICULAR RATE: 79 BPM
EOSINOPHILS ABSOLUTE: 0.1 K/CU MM
EOSINOPHILS RELATIVE PERCENT: 0.9 % (ref 0–3)
GFR SERPL CREATININE-BSD FRML MDRD: >60 ML/MIN/1.73M2
GLUCOSE SERPL-MCNC: 94 MG/DL (ref 70–99)
HCG QUALITATIVE: NEGATIVE
HCT VFR BLD CALC: 35.3 % (ref 37–47)
HEMOGLOBIN: 12.6 GM/DL (ref 12.5–16)
IMMATURE NEUTROPHIL %: 0.3 % (ref 0–0.43)
LYMPHOCYTES ABSOLUTE: 2.2 K/CU MM
LYMPHOCYTES RELATIVE PERCENT: 31.8 % (ref 24–44)
MCH RBC QN AUTO: 28.7 PG (ref 27–31)
MCHC RBC AUTO-ENTMCNC: 35.7 % (ref 32–36)
MCV RBC AUTO: 80.4 FL (ref 78–100)
MONOCYTES ABSOLUTE: 0.4 K/CU MM
MONOCYTES RELATIVE PERCENT: 5.8 % (ref 0–4)
PDW BLD-RTO: 12.4 % (ref 11.7–14.9)
PLATELET # BLD: 289 K/CU MM (ref 140–440)
PMV BLD AUTO: 9.8 FL (ref 7.5–11.1)
POTASSIUM SERPL-SCNC: 3.4 MMOL/L (ref 3.5–5.1)
RBC # BLD: 4.39 M/CU MM (ref 4.2–5.4)
SEGMENTED NEUTROPHILS ABSOLUTE COUNT: 4.2 K/CU MM
SEGMENTED NEUTROPHILS RELATIVE PERCENT: 60.8 % (ref 36–66)
SODIUM BLD-SCNC: 140 MMOL/L (ref 135–145)
TOTAL IMMATURE NEUTOROPHIL: 0.02 K/CU MM
WBC # BLD: 6.9 K/CU MM (ref 4–10.5)

## 2023-07-27 PROCEDURE — 6370000000 HC RX 637 (ALT 250 FOR IP): Performed by: EMERGENCY MEDICINE

## 2023-07-27 PROCEDURE — 80048 BASIC METABOLIC PNL TOTAL CA: CPT

## 2023-07-27 PROCEDURE — 99284 EMERGENCY DEPT VISIT MOD MDM: CPT

## 2023-07-27 PROCEDURE — 93010 ELECTROCARDIOGRAM REPORT: CPT | Performed by: INTERNAL MEDICINE

## 2023-07-27 PROCEDURE — 85025 COMPLETE CBC W/AUTO DIFF WBC: CPT

## 2023-07-27 PROCEDURE — 2580000003 HC RX 258: Performed by: EMERGENCY MEDICINE

## 2023-07-27 PROCEDURE — 93005 ELECTROCARDIOGRAM TRACING: CPT | Performed by: EMERGENCY MEDICINE

## 2023-07-27 PROCEDURE — 84703 CHORIONIC GONADOTROPIN ASSAY: CPT

## 2023-07-27 RX ORDER — 0.9 % SODIUM CHLORIDE 0.9 %
1000 INTRAVENOUS SOLUTION INTRAVENOUS ONCE
Status: COMPLETED | OUTPATIENT
Start: 2023-07-27 | End: 2023-07-27

## 2023-07-27 RX ORDER — POTASSIUM CHLORIDE 750 MG/1
40 TABLET, FILM COATED, EXTENDED RELEASE ORAL ONCE
Status: COMPLETED | OUTPATIENT
Start: 2023-07-27 | End: 2023-07-27

## 2023-07-27 RX ADMIN — POTASSIUM CHLORIDE 40 MEQ: 750 TABLET, FILM COATED, EXTENDED RELEASE ORAL at 16:17

## 2023-07-27 RX ADMIN — SODIUM CHLORIDE 1000 ML: 9 INJECTION, SOLUTION INTRAVENOUS at 15:28

## 2023-07-27 ASSESSMENT — LIFESTYLE VARIABLES
HOW OFTEN DO YOU HAVE A DRINK CONTAINING ALCOHOL: MONTHLY OR LESS
HOW MANY STANDARD DRINKS CONTAINING ALCOHOL DO YOU HAVE ON A TYPICAL DAY: 1 OR 2

## 2023-07-27 ASSESSMENT — PAIN - FUNCTIONAL ASSESSMENT: PAIN_FUNCTIONAL_ASSESSMENT: NONE - DENIES PAIN

## 2023-07-27 NOTE — DISCHARGE INSTRUCTIONS
Return immediately to the emergency department if you experience new or worsened symptoms, chest pain, shortness of breath, lightheadedness, or for any other concerns.

## 2023-07-27 NOTE — ED NOTES
Patient discharge with instructions. Pt verbalized understanding.         Anuel Grewal, RN  07/27/23 4954

## 2023-07-30 LAB
BACTERIA SPEC AEROBE CULT: NORMAL
BACTERIA SPEC ANAEROBE CULT: NORMAL
GRAM STN SPEC: NORMAL

## 2023-08-10 ENCOUNTER — OFFICE VISIT (OUTPATIENT)
Dept: FAMILY MEDICINE CLINIC | Age: 43
End: 2023-08-10
Payer: COMMERCIAL

## 2023-08-10 VITALS
SYSTOLIC BLOOD PRESSURE: 128 MMHG | HEART RATE: 67 BPM | DIASTOLIC BLOOD PRESSURE: 88 MMHG | WEIGHT: 243.2 LBS | HEIGHT: 68 IN | BODY MASS INDEX: 36.86 KG/M2

## 2023-08-10 DIAGNOSIS — E87.6 HYPOKALEMIA: Primary | ICD-10-CM

## 2023-08-10 DIAGNOSIS — R00.2 INTERMITTENT PALPITATIONS: ICD-10-CM

## 2023-08-10 DIAGNOSIS — Z63.79 STRESSFUL LIFE EVENT AFFECTING FAMILY: ICD-10-CM

## 2023-08-10 DIAGNOSIS — F41.9 ANXIOUSNESS: ICD-10-CM

## 2023-08-10 DIAGNOSIS — R42 SPELL OF DIZZINESS: ICD-10-CM

## 2023-08-10 PROCEDURE — 99214 OFFICE O/P EST MOD 30 MIN: CPT | Performed by: PHYSICIAN ASSISTANT

## 2023-08-10 PROCEDURE — 3079F DIAST BP 80-89 MM HG: CPT | Performed by: PHYSICIAN ASSISTANT

## 2023-08-10 PROCEDURE — G8427 DOCREV CUR MEDS BY ELIG CLIN: HCPCS | Performed by: PHYSICIAN ASSISTANT

## 2023-08-10 PROCEDURE — 3074F SYST BP LT 130 MM HG: CPT | Performed by: PHYSICIAN ASSISTANT

## 2023-08-10 PROCEDURE — G8417 CALC BMI ABV UP PARAM F/U: HCPCS | Performed by: PHYSICIAN ASSISTANT

## 2023-08-10 PROCEDURE — 1036F TOBACCO NON-USER: CPT | Performed by: PHYSICIAN ASSISTANT

## 2023-08-10 RX ORDER — HYDROXYZINE HYDROCHLORIDE 25 MG/1
25-50 TABLET, FILM COATED ORAL EVERY 8 HOURS PRN
Qty: 30 TABLET | Refills: 0 | Status: SHIPPED | OUTPATIENT
Start: 2023-08-10 | End: 2023-08-20

## 2023-08-10 NOTE — PROGRESS NOTES
8/10/2023    [address] [place] [address]    Chief Complaint   Patient presents with    Follow-up     7-27-23 [place] [facility] ,   low potassium ,  heart palpitations        HPI  History was obtained from patient. Eva Child is a 37 y.o. female who presents today for ED follow-up. Patient presented to the emergency department 2 weeks ago with complaints of heart palpitations. She states she has been under a lot of stress. She does have underlying anxiety and has not been open to medication until today. She is willing to try hydroxyzine. Denies SI/HI. In brief, she gets random episodes of dizziness and heart palpitations that seem to start with increased stress that lead to anxiousness. She is following cardiology. While in the ED, she did have just slightly low potassium which was replaced in the single dose and not rechecked. Patient does take chlorthalidone which has helped control her blood pressure. She has had side effects from other antihypertensive med classes. Patient would like to try supplementing potassium in her diet rather than pills. She admits that she does not drink much water throughout the day. Has been told symptoms may be related to dehydration. She denies any tobacco use or illicit drug use. She drinks alcohol rarely, on social occasions only. Stress test 5/31/2023  Summary   Normal near maximal study negative for angina or ECG evidence of ischemia. Exercise tolerance is good. The patient exercised according to the [facility] for   7:01 min:s, achieving a work level of Max. METS: 8.50. Baseline hypertension with appropriate response to exercise noted. Echo 5/15/2023  Left ventricular function is low normal, EF is estimated at 50-55%. Left ventricle size is normal.   Moderate left ventricular hypertrophy. Grade I diastolic dysfunction. Mild mitral, tricuspid and pulmonic regurgitation is present. RVSP is 25 mmHg. No evidence of pericardial effusion.         Event

## 2023-08-21 ENCOUNTER — OFFICE VISIT (OUTPATIENT)
Dept: OBGYN | Age: 43
End: 2023-08-21
Payer: COMMERCIAL

## 2023-08-21 VITALS
BODY MASS INDEX: 37.13 KG/M2 | WEIGHT: 245 LBS | DIASTOLIC BLOOD PRESSURE: 89 MMHG | HEIGHT: 68 IN | HEART RATE: 64 BPM | SYSTOLIC BLOOD PRESSURE: 134 MMHG

## 2023-08-21 DIAGNOSIS — N74 FEMALE PELVIC INFLAMMATORY DISORDERS IN DISEASES CLASSIFIED ELSEWHERE: ICD-10-CM

## 2023-08-21 DIAGNOSIS — I10 ESSENTIAL HYPERTENSION: ICD-10-CM

## 2023-08-21 DIAGNOSIS — L73.2 HIDRADENITIS SUPPURATIVA: ICD-10-CM

## 2023-08-21 DIAGNOSIS — N89.8 VAGINAL DISCHARGE: Primary | ICD-10-CM

## 2023-08-21 PROCEDURE — G8417 CALC BMI ABV UP PARAM F/U: HCPCS | Performed by: OBSTETRICS & GYNECOLOGY

## 2023-08-21 PROCEDURE — 3075F SYST BP GE 130 - 139MM HG: CPT | Performed by: OBSTETRICS & GYNECOLOGY

## 2023-08-21 PROCEDURE — G8427 DOCREV CUR MEDS BY ELIG CLIN: HCPCS | Performed by: OBSTETRICS & GYNECOLOGY

## 2023-08-21 PROCEDURE — 99214 OFFICE O/P EST MOD 30 MIN: CPT | Performed by: OBSTETRICS & GYNECOLOGY

## 2023-08-21 PROCEDURE — 1036F TOBACCO NON-USER: CPT | Performed by: OBSTETRICS & GYNECOLOGY

## 2023-08-21 PROCEDURE — 3079F DIAST BP 80-89 MM HG: CPT | Performed by: OBSTETRICS & GYNECOLOGY

## 2023-08-21 RX ORDER — DOXYCYCLINE HYCLATE 100 MG
100 TABLET ORAL 2 TIMES DAILY
Qty: 14 TABLET | Refills: 0 | Status: SHIPPED | OUTPATIENT
Start: 2023-08-21 | End: 2023-08-28

## 2023-08-21 RX ORDER — CHLORTHALIDONE 25 MG/1
25 TABLET ORAL DAILY
Qty: 30 TABLET | Refills: 3 | Status: SHIPPED | OUTPATIENT
Start: 2023-08-21

## 2023-08-21 RX ORDER — CLINDAMYCIN PHOSPHATE 11.9 MG/ML
SOLUTION TOPICAL
Qty: 60 ML | Refills: 5 | Status: SHIPPED | OUTPATIENT
Start: 2023-08-21 | End: 2023-09-20

## 2023-08-21 RX ORDER — SPIRONOLACTONE 100 MG/1
100 TABLET, FILM COATED ORAL DAILY
Qty: 90 TABLET | Refills: 3 | Status: SHIPPED | OUTPATIENT
Start: 2023-08-21 | End: 2023-08-21

## 2023-08-22 LAB
C TRACH DNA SPEC QL NAA+PROBE: NOT DETECTED
CANDIDA RRNA VAG QL PROBE: NOT DETECTED
CANDIDA RRNA VAG QL PROBE: NOT DETECTED
CARBAPENEM RESISTANCE OXA-48 GENE BY PCR: NOT DETECTED
CEPHALOSPORIN RESISTANCE AMPC GENE: NOT DETECTED
ESBL RESISTANCE: NOT DETECTED
G VAGINALIS RRNA GENITAL QL PROBE: NOT DETECTED
M HOMINIS DNA BLD QL NAA+PROBE: NOT DETECTED
MACROLIDE RESISTANCE: ABNORMAL
METHICILLIN RESISTANCE: NOT DETECTED
MYCOPLASMA DNA SPEC QL NAA+PROBE: NOT DETECTED
N GONORRHOEA DNA SPEC QL NAA+PROBE: NOT DETECTED
OTHER MICROORG DNA SPEC QL NAA+PROBE: ABNORMAL
OTHER MICROORG DNA SPEC QL NAA+PROBE: NOT DETECTED
QUINOLONE AND FLUOROQUINOLONE RESISTANCE: NOT DETECTED
T VAGINALIS RRNA SPEC QL NAA+PROBE: NOT DETECTED
TETRACYCLINE RESISTANCE: ABNORMAL
TRIMETHOPRIM/SULFONAMIDE RESISTANCE: NOT DETECTED

## 2023-09-27 ENCOUNTER — TELEPHONE (OUTPATIENT)
Dept: OBGYN | Age: 43
End: 2023-09-27

## 2023-09-27 DIAGNOSIS — N89.8 VAGINAL ODOR: Primary | ICD-10-CM

## 2023-09-27 RX ORDER — METRONIDAZOLE 500 MG/1
500 TABLET ORAL 2 TIMES DAILY
Qty: 14 TABLET | Refills: 0 | Status: SHIPPED | OUTPATIENT
Start: 2023-09-27 | End: 2023-10-04

## 2023-09-27 NOTE — TELEPHONE ENCOUNTER
Pt requesting medication for ss of bv. Pt states she initially thought she had a yeast infection. Pt states she has used otc monistat w/ no relieve. Pt states yesterday she developed an odor.  Pt had office visit in August.

## 2023-10-03 ENCOUNTER — OFFICE VISIT (OUTPATIENT)
Dept: NEUROLOGY | Age: 43
End: 2023-10-03
Payer: COMMERCIAL

## 2023-10-03 VITALS
BODY MASS INDEX: 36.46 KG/M2 | WEIGHT: 240.6 LBS | OXYGEN SATURATION: 97 % | SYSTOLIC BLOOD PRESSURE: 138 MMHG | DIASTOLIC BLOOD PRESSURE: 88 MMHG | HEART RATE: 94 BPM | HEIGHT: 68 IN

## 2023-10-03 DIAGNOSIS — R29.818 TRANSIENT NEUROLOGICAL SYMPTOMS: Primary | ICD-10-CM

## 2023-10-03 PROCEDURE — 99245 OFF/OP CONSLTJ NEW/EST HI 55: CPT | Performed by: PSYCHIATRY & NEUROLOGY

## 2023-10-03 PROCEDURE — G8484 FLU IMMUNIZE NO ADMIN: HCPCS | Performed by: PSYCHIATRY & NEUROLOGY

## 2023-10-03 PROCEDURE — G8427 DOCREV CUR MEDS BY ELIG CLIN: HCPCS | Performed by: PSYCHIATRY & NEUROLOGY

## 2023-10-03 PROCEDURE — 3075F SYST BP GE 130 - 139MM HG: CPT | Performed by: PSYCHIATRY & NEUROLOGY

## 2023-10-03 PROCEDURE — 3079F DIAST BP 80-89 MM HG: CPT | Performed by: PSYCHIATRY & NEUROLOGY

## 2023-10-03 PROCEDURE — G8417 CALC BMI ABV UP PARAM F/U: HCPCS | Performed by: PSYCHIATRY & NEUROLOGY

## 2023-10-03 NOTE — PROGRESS NOTES
10/3/23    Donato Angulo N Vic  1980    Chief Complaint   Patient presents with    Other     Pt presents for history of TIA. History of Present Illness  Donato Angulo is a 37 y.o. female presenting today for evaluation of: An episode of transient right-sided numbness and weakness    She states in April she was out shopping with her daughter and she did not feel right. She started having some numbness of the right side of her body and she was feeling \"sleepy and spacey\". She went home and she was carrying her shopping bags into the house and she actually tripped and fell walking into the home. She tells me that the fall was because the right side of her body was weak and numb and she could not feel her foot hitting the ground. Again, she tells me that she felt very sleepy and spacey. She then went to bed because she felt so exhausted. She slept for 12 hours waking up at around 5:30 AM the next day. She did not have any headache. She felt continually tired for the next few days. She has never had anything like this happen before. She tells me that there is a lot going on in April. She is very emotionally and mentally exhausted because she was having arguments with good friends and it was very stressful. This was around her birthday time. She has fibromyalgia and hypertension. She has a family history of stroke in her grandmother. Her PCP did order an MRI of the brain after the spell which was normal.  She has never had anything like this happen before. She tells me that her symptoms resolved.       Subjective    Review of Symptoms:  Neurologic   Symptoms: no difficulty with gait or walking, no bowel symptoms, no vertigo, no confusion, no memory loss, no speech disorder, no visual loss, no double vision, no dizziness, no loss of hearing, no sensory disturbances, no weakness, no headaches, no bladder symptoms, no seizures, no excessive fatigue, and no syncope    Current Outpatient Medications

## 2023-10-16 ENCOUNTER — HOSPITAL ENCOUNTER (OUTPATIENT)
Dept: ULTRASOUND IMAGING | Age: 43
Discharge: HOME | End: 2023-10-16
Attending: PSYCHIATRY & NEUROLOGY
Payer: COMMERCIAL

## 2023-10-16 DIAGNOSIS — R29.818 TRANSIENT NEUROLOGICAL SYMPTOMS: ICD-10-CM

## 2023-10-16 PROCEDURE — 93880 EXTRACRANIAL BILAT STUDY: CPT

## 2023-10-24 ENCOUNTER — HOSPITAL ENCOUNTER (OUTPATIENT)
Dept: SLEEP CENTER | Age: 43
Discharge: HOME | End: 2023-10-24
Payer: COMMERCIAL

## 2023-10-24 DIAGNOSIS — R29.818 TRANSIENT NEUROLOGICAL SYMPTOMS: ICD-10-CM

## 2023-10-24 PROCEDURE — 95819 EEG AWAKE AND ASLEEP: CPT | Performed by: STUDENT IN AN ORGANIZED HEALTH CARE EDUCATION/TRAINING PROGRAM

## 2023-10-24 PROCEDURE — 95819 EEG AWAKE AND ASLEEP: CPT

## 2023-10-24 NOTE — PROCEDURES
ROUTINE ELECTROENCEPHALOGRAM    Identifying Information:  Name: Nahomy Posey  MRN: [id number]  : 1980  Interpreting Physician: Yumiko Taylor DO  Referring Provider: Osmin Varma DO  Date of EEG: 10/24/23  Procedure Location: Outpatient [facility]     Clinical History:  Nahomy Posey is a 37 y.o. female with concern for seizures     Current Medications:        Indication:  Rule out seizure/seizure disorder     Technical Summary:  28 channels of EEG were recorded in a digital format on a patient who was reported to be awake, drowsy, and asleep during the recording. The patient not sleep deprived prior to the EEG. The PDR consisted of well-developed, well-regulated 8-9 Hz alpha activity, maximal over the posterior head regions and reactive to eye opening and closure. Photic stimulation performed and did not produce any abnormalities. During the recording stage II sleep was seen. The EKG lead revealed no rhythm abnormalities. EEG Interpretation: This EEG was within normal limits for a patient of this age in the awake, drowsy, and asleep state. No focal, lateralizing, or epileptiform features were seen during the recording. Clinical correlation is recommended.     Yumiko Taylor DO  Epileptologist  10/24/2023 4:59 PM

## 2023-11-27 RX ORDER — CLINDAMYCIN PHOSPHATE 20 MG/G
1 CREAM VAGINAL NIGHTLY
Qty: 40 G | Refills: 0 | Status: SHIPPED | OUTPATIENT
Start: 2023-11-27 | End: 2023-12-02

## 2023-12-11 PROBLEM — L73.2 HIDRADENITIS SUPPURATIVA: Status: ACTIVE | Noted: 2023-12-11

## 2023-12-26 DIAGNOSIS — H60.513 ACUTE ACTINIC OTITIS EXTERNA OF BOTH EARS: Primary | ICD-10-CM

## 2023-12-26 RX ORDER — CIPROFLOXACIN AND DEXAMETHASONE 3; 1 MG/ML; MG/ML
4 SUSPENSION/ DROPS AURICULAR (OTIC) 2 TIMES DAILY
Qty: 1 EACH | Refills: 0 | Status: SHIPPED | OUTPATIENT
Start: 2023-12-26 | End: 2024-01-05

## 2023-12-26 RX ORDER — CIPROFLOXACIN AND DEXAMETHASONE 3; 1 MG/ML; MG/ML
4 SUSPENSION/ DROPS AURICULAR (OTIC) 2 TIMES DAILY
Qty: 7.5 ML | Refills: 0 | Status: SHIPPED | OUTPATIENT
Start: 2023-12-26 | End: 2023-12-26

## 2024-01-04 ENCOUNTER — OFFICE VISIT (OUTPATIENT)
Dept: NEUROLOGY | Age: 44
End: 2024-01-04
Payer: COMMERCIAL

## 2024-01-04 VITALS
DIASTOLIC BLOOD PRESSURE: 98 MMHG | HEIGHT: 68 IN | SYSTOLIC BLOOD PRESSURE: 140 MMHG | BODY MASS INDEX: 37.28 KG/M2 | WEIGHT: 246 LBS | HEART RATE: 92 BPM | OXYGEN SATURATION: 99 %

## 2024-01-04 DIAGNOSIS — R29.818 TRANSIENT NEUROLOGICAL SYMPTOMS: Primary | ICD-10-CM

## 2024-01-04 PROCEDURE — G8417 CALC BMI ABV UP PARAM F/U: HCPCS | Performed by: NURSE PRACTITIONER

## 2024-01-04 PROCEDURE — 1036F TOBACCO NON-USER: CPT | Performed by: NURSE PRACTITIONER

## 2024-01-04 PROCEDURE — 3080F DIAST BP >= 90 MM HG: CPT | Performed by: NURSE PRACTITIONER

## 2024-01-04 PROCEDURE — 3077F SYST BP >= 140 MM HG: CPT | Performed by: NURSE PRACTITIONER

## 2024-01-04 PROCEDURE — G8484 FLU IMMUNIZE NO ADMIN: HCPCS | Performed by: NURSE PRACTITIONER

## 2024-01-04 PROCEDURE — G8427 DOCREV CUR MEDS BY ELIG CLIN: HCPCS | Performed by: NURSE PRACTITIONER

## 2024-01-04 PROCEDURE — 99213 OFFICE O/P EST LOW 20 MIN: CPT | Performed by: NURSE PRACTITIONER

## 2024-01-04 NOTE — PROGRESS NOTES
1/4/24    Dakota LANDEROS Vic  1980    Chief Complaint   Patient presents with    Follow-up     Right sided numbness/tingling        History of Present Illness  Dakota is a 43 y.o. female presenting today for follow-up of: Transient right-sided hemisensory loss and hemiparesis.  MRI of the brain was unremarkable.  EEG was ordered to evaluate for seizure activity and bilateral carotid artery ultrasound was ordered to ensure no embolic source of possible TIA. Her EEG was normal and her carotid ultrasound was without evidence of carotid stenosis.    She tells me that she has not had any further episodes of right sided hemisensory loss and hemiparesis.     Current Outpatient Medications   Medication Sig Dispense Refill    ciprofloxacin-dexAMETHasone (CIPRODEX) 0.3-0.1 % otic suspension Place 4 drops into both ears 2 times daily for 10 days 1 each 0    tretinoin (RETIN-A) 0.05 % cream Apply topically nightly. 45 g 5    etodolac (LODINE) 500 MG tablet Take 1 tablet by mouth in the morning and at bedtime      linaclotide (LINZESS) 145 MCG capsule Take 1 capsule by mouth every morning (before breakfast) 30 capsule 2    baclofen (LIORESAL) 10 MG tablet Take 1 tablet by mouth 3 times daily      clindamycin-benzoyl peroxide (BENZACLIN) 1-5 % gel Apply pea sized amount to effected area topically daily 50 g 2    chlorthalidone (HYGROTON) 25 MG tablet Take 1 tablet by mouth daily (Patient not taking: Reported on 1/4/2024) 30 tablet 3     No current facility-administered medications for this visit.       Physical Exam:  Mental Status              Orientation: oriented to person, oriented to place, oriented to problem, and oriented to time              Mood/Affect: appropriate mood and appropriate affect              Memory/Other: recent memory intact, remote memory intact, fund of knowledge intact, attention span normal, and concentration normal  Language              Language: (normal) language, no dysarthria, (normal)

## 2024-01-17 ENCOUNTER — TELEPHONE (OUTPATIENT)
Dept: OBGYN | Age: 44
End: 2024-01-17

## 2024-01-17 RX ORDER — METRONIDAZOLE 500 MG/1
500 TABLET ORAL 2 TIMES DAILY
Qty: 14 TABLET | Refills: 0 | Status: SHIPPED | OUTPATIENT
Start: 2024-01-17 | End: 2024-01-24

## 2024-01-17 NOTE — TELEPHONE ENCOUNTER
Pt sched for noemy on 1/30/24. Pt states she was informed her partner was never treated from when she last had + trich on 12/18/23. What do you recommend?

## 2024-01-17 NOTE — TELEPHONE ENCOUNTER
I have sent in a prescription for Flagyl twice daily for 7 days.  No sexual intercourse until 7 days after completion of therapy by both partners.  Please cancel January 30 and schedule culture of cure in 4 to 6 weeks.

## 2024-01-22 ENCOUNTER — OFFICE VISIT (OUTPATIENT)
Dept: FAMILY MEDICINE CLINIC | Age: 44
End: 2024-01-22
Payer: COMMERCIAL

## 2024-01-22 VITALS
BODY MASS INDEX: 37.04 KG/M2 | DIASTOLIC BLOOD PRESSURE: 102 MMHG | HEIGHT: 68 IN | SYSTOLIC BLOOD PRESSURE: 144 MMHG | WEIGHT: 244.4 LBS

## 2024-01-22 DIAGNOSIS — E66.09 OBESITY DUE TO EXCESS CALORIES, UNSPECIFIED CLASSIFICATION, UNSPECIFIED WHETHER SERIOUS COMORBIDITY PRESENT: ICD-10-CM

## 2024-01-22 DIAGNOSIS — L70.0 CYSTIC ACNE: ICD-10-CM

## 2024-01-22 DIAGNOSIS — I10 ESSENTIAL HYPERTENSION: ICD-10-CM

## 2024-01-22 DIAGNOSIS — I10 ESSENTIAL HYPERTENSION: Primary | ICD-10-CM

## 2024-01-22 LAB
ANION GAP SERPL CALCULATED.3IONS-SCNC: 10 MMOL/L (ref 3–16)
BUN SERPL-MCNC: 15 MG/DL (ref 7–20)
CALCIUM SERPL-MCNC: 9.1 MG/DL (ref 8.3–10.6)
CHLORIDE SERPL-SCNC: 103 MMOL/L (ref 99–110)
CHOLEST SERPL-MCNC: 150 MG/DL (ref 0–199)
CO2 SERPL-SCNC: 24 MMOL/L (ref 21–32)
CREAT SERPL-MCNC: 0.8 MG/DL (ref 0.6–1.1)
GFR SERPLBLD CREATININE-BSD FMLA CKD-EPI: >60 ML/MIN/{1.73_M2}
GLUCOSE SERPL-MCNC: 88 MG/DL (ref 70–99)
HDLC SERPL-MCNC: 41 MG/DL (ref 40–60)
LDLC SERPL CALC-MCNC: 99 MG/DL
MAGNESIUM SERPL-MCNC: 1.8 MG/DL (ref 1.8–2.4)
POTASSIUM SERPL-SCNC: 4 MMOL/L (ref 3.5–5.1)
SODIUM SERPL-SCNC: 137 MMOL/L (ref 136–145)
TRIGL SERPL-MCNC: 49 MG/DL (ref 0–150)
VLDLC SERPL CALC-MCNC: 10 MG/DL

## 2024-01-22 PROCEDURE — G8417 CALC BMI ABV UP PARAM F/U: HCPCS | Performed by: STUDENT IN AN ORGANIZED HEALTH CARE EDUCATION/TRAINING PROGRAM

## 2024-01-22 PROCEDURE — 1036F TOBACCO NON-USER: CPT | Performed by: STUDENT IN AN ORGANIZED HEALTH CARE EDUCATION/TRAINING PROGRAM

## 2024-01-22 PROCEDURE — G8427 DOCREV CUR MEDS BY ELIG CLIN: HCPCS | Performed by: STUDENT IN AN ORGANIZED HEALTH CARE EDUCATION/TRAINING PROGRAM

## 2024-01-22 PROCEDURE — 3077F SYST BP >= 140 MM HG: CPT | Performed by: STUDENT IN AN ORGANIZED HEALTH CARE EDUCATION/TRAINING PROGRAM

## 2024-01-22 PROCEDURE — 99213 OFFICE O/P EST LOW 20 MIN: CPT | Performed by: STUDENT IN AN ORGANIZED HEALTH CARE EDUCATION/TRAINING PROGRAM

## 2024-01-22 PROCEDURE — 3080F DIAST BP >= 90 MM HG: CPT | Performed by: STUDENT IN AN ORGANIZED HEALTH CARE EDUCATION/TRAINING PROGRAM

## 2024-01-22 PROCEDURE — G8484 FLU IMMUNIZE NO ADMIN: HCPCS | Performed by: STUDENT IN AN ORGANIZED HEALTH CARE EDUCATION/TRAINING PROGRAM

## 2024-01-22 RX ORDER — SPIRONOLACTONE 100 MG/1
100 TABLET, FILM COATED ORAL DAILY
Qty: 90 TABLET | Refills: 3
Start: 2024-01-22

## 2024-01-22 ASSESSMENT — PATIENT HEALTH QUESTIONNAIRE - PHQ9
1. LITTLE INTEREST OR PLEASURE IN DOING THINGS: 0
2. FEELING DOWN, DEPRESSED OR HOPELESS: 1
SUM OF ALL RESPONSES TO PHQ QUESTIONS 1-9: 1
SUM OF ALL RESPONSES TO PHQ9 QUESTIONS 1 & 2: 1

## 2024-01-22 NOTE — PROGRESS NOTES
week    Drug use: No    Sexual activity: Yes     Social Determinants of Health     Financial Resource Strain: Low Risk  (2022)    Overall Financial Resource Strain (CARDIA)     Difficulty of Paying Living Expenses: Not hard at all   Transportation Needs: No Transportation Needs (2022)    PRAPARE - Transportation     Lack of Transportation (Medical): No     Lack of Transportation (Non-Medical): No        SURGICAL HISTORY  Past Surgical History:   Procedure Laterality Date     SECTION       &     CHOLECYSTECTOMY  08/15/2018    laprascopic    COLONOSCOPY      DILATION AND CURETTAGE      ENDOMETRIAL ABLATION      HERNIA REPAIR Right 2021    ROBOTIC EXPLORATION, HERNIA RIGHT INGUINAL REPAIR AND VENTRAL HERNIA REPAIR LAPAROSCOPIC performed by Alma Juárez MD at [facility] OR    HYSTERECTOMY (CERVIX STATUS UNKNOWN)      LAV    OVARY REMOVAL Right     SALPINGECTOMY Bilateral     UPPER GASTROINTESTINAL ENDOSCOPY  2017                 CURRENT MEDICATIONS  Current Outpatient Medications   Medication Sig Dispense Refill    spironolactone (ALDACTONE) 100 MG tablet Take 1 tablet by mouth daily 90 tablet 3    metroNIDAZOLE (FLAGYL) 500 MG tablet Take 1 tablet by mouth 2 times daily for 7 days 14 tablet 0    etodolac (LODINE) 500 MG tablet Take 1 tablet by mouth in the morning and at bedtime      linaclotide (LINZESS) 145 MCG capsule Take 1 capsule by mouth every morning (before breakfast) 30 capsule 2    baclofen (LIORESAL) 10 MG tablet Take 1 tablet by mouth 3 times daily      clindamycin-benzoyl peroxide (BENZACLIN) 1-5 % gel Apply pea sized amount to effected area topically daily 50 g 2     No current facility-administered medications for this visit.       ALLERGIES  Allergies   Allergen Reactions    Chlorthalidone      Causes my heart to flutter    Lisinopril Cough     cough       PHYSICAL EXAM    BP (!) 144/102 (Site: Left Upper Arm, Position: Sitting, Cuff Size: Large Adult)   Ht 1.727 m

## 2024-01-23 ENCOUNTER — TELEPHONE (OUTPATIENT)
Dept: FAMILY MEDICINE CLINIC | Age: 44
End: 2024-01-23

## 2024-01-23 DIAGNOSIS — I10 ESSENTIAL HYPERTENSION: Primary | ICD-10-CM

## 2024-01-23 NOTE — TELEPHONE ENCOUNTER
Patient called in states she took her spironolactone lastnight around 8 and went to bed, when she got up today her BP was 156/104 before she took any medications.  Doesn't think the medication is working and wants to know how to proceed

## 2024-01-23 NOTE — RESULT ENCOUNTER NOTE
Dakota, your labs came back normal. Good cholesterol a little low normal magnesium. Recommend you take a multivitamin daily 1

## 2024-01-25 RX ORDER — CLONIDINE HYDROCHLORIDE 0.1 MG/1
0.1 TABLET ORAL 2 TIMES DAILY
Qty: 60 TABLET | Refills: 3 | Status: SHIPPED | OUTPATIENT
Start: 2024-01-25

## 2024-01-25 NOTE — TELEPHONE ENCOUNTER
Recommend we start clonidine, recommend nephrology eval if still not improving. Clonidine 0.1 mg BID, please make f/u appt w me in 4 weeks

## 2024-01-30 ASSESSMENT — ENCOUNTER SYMPTOMS
SHORTNESS OF BREATH: 0
NAUSEA: 0
ABDOMINAL PAIN: 0
WHEEZING: 0
SORE THROAT: 0

## 2024-02-14 ENCOUNTER — TELEPHONE (OUTPATIENT)
Dept: FAMILY MEDICINE CLINIC | Age: 44
End: 2024-02-14

## 2024-02-14 NOTE — TELEPHONE ENCOUNTER
Christine Palacios NP from [facility]  called and stated that she saw patient 12-13-24 late in the day. Patients BP in the right arm was 150/100   Waited 5 min. And in the left arm BP was 152/102.  Patient has an appt in this office 2-27-24

## 2024-02-19 ENCOUNTER — TELEPHONE (OUTPATIENT)
Dept: OBGYN | Age: 44
End: 2024-02-19

## 2024-02-19 RX ORDER — FLUCONAZOLE 150 MG/1
150 TABLET ORAL
Qty: 2 TABLET | Refills: 0 | Status: SHIPPED | OUTPATIENT
Start: 2024-02-19 | End: 2024-02-22

## 2024-02-19 NOTE — TELEPHONE ENCOUNTER
Pt requesting medication for vaginal yeast for 2 days. Pt c/o vaginal yeast with irritation. Pt pharmacy is up to date in chart.

## 2024-02-27 ENCOUNTER — OFFICE VISIT (OUTPATIENT)
Dept: FAMILY MEDICINE CLINIC | Age: 44
End: 2024-02-27
Payer: COMMERCIAL

## 2024-02-27 VITALS
HEIGHT: 68 IN | WEIGHT: 248.7 LBS | OXYGEN SATURATION: 100 % | SYSTOLIC BLOOD PRESSURE: 134 MMHG | HEART RATE: 57 BPM | BODY MASS INDEX: 37.69 KG/M2 | DIASTOLIC BLOOD PRESSURE: 102 MMHG

## 2024-02-27 DIAGNOSIS — K58.1 IRRITABLE BOWEL SYNDROME WITH CONSTIPATION: Primary | ICD-10-CM

## 2024-02-27 DIAGNOSIS — L70.0 CYSTIC ACNE: ICD-10-CM

## 2024-02-27 DIAGNOSIS — K58.1 IRRITABLE BOWEL SYNDROME WITH CONSTIPATION: ICD-10-CM

## 2024-02-27 DIAGNOSIS — I10 ESSENTIAL HYPERTENSION: ICD-10-CM

## 2024-02-27 DIAGNOSIS — R10.84 GENERALIZED ABDOMINAL PAIN: ICD-10-CM

## 2024-02-27 PROCEDURE — 3075F SYST BP GE 130 - 139MM HG: CPT | Performed by: STUDENT IN AN ORGANIZED HEALTH CARE EDUCATION/TRAINING PROGRAM

## 2024-02-27 PROCEDURE — 1036F TOBACCO NON-USER: CPT | Performed by: STUDENT IN AN ORGANIZED HEALTH CARE EDUCATION/TRAINING PROGRAM

## 2024-02-27 PROCEDURE — 3080F DIAST BP >= 90 MM HG: CPT | Performed by: STUDENT IN AN ORGANIZED HEALTH CARE EDUCATION/TRAINING PROGRAM

## 2024-02-27 PROCEDURE — 99214 OFFICE O/P EST MOD 30 MIN: CPT | Performed by: STUDENT IN AN ORGANIZED HEALTH CARE EDUCATION/TRAINING PROGRAM

## 2024-02-27 PROCEDURE — G8484 FLU IMMUNIZE NO ADMIN: HCPCS | Performed by: STUDENT IN AN ORGANIZED HEALTH CARE EDUCATION/TRAINING PROGRAM

## 2024-02-27 PROCEDURE — G8427 DOCREV CUR MEDS BY ELIG CLIN: HCPCS | Performed by: STUDENT IN AN ORGANIZED HEALTH CARE EDUCATION/TRAINING PROGRAM

## 2024-02-27 PROCEDURE — 81002 URINALYSIS NONAUTO W/O SCOPE: CPT | Performed by: STUDENT IN AN ORGANIZED HEALTH CARE EDUCATION/TRAINING PROGRAM

## 2024-02-27 PROCEDURE — G8417 CALC BMI ABV UP PARAM F/U: HCPCS | Performed by: STUDENT IN AN ORGANIZED HEALTH CARE EDUCATION/TRAINING PROGRAM

## 2024-02-27 RX ORDER — SPIRONOLACTONE 50 MG/1
50 TABLET, FILM COATED ORAL DAILY
Qty: 90 TABLET | Refills: 1
Start: 2024-02-27 | End: 2024-08-25

## 2024-02-27 NOTE — PROGRESS NOTES
3/4/2024    Dakota LANDEROS Vic    Chief Complaint   Patient presents with    1 Month Follow-Up     1 month. Blood pressure.        HPI  History was obtained from patient.  Dakota is a 43 y.o. female with a PMHx as listed below who presents today for 1 month follow up. No acute complaints.   BP high patient off   Chronic issues constipation/diarrhea off and on intermittent  1. Irritable bowel syndrome with constipation    2. Essential hypertension    3. Cystic acne    4. Generalized abdominal pain             REVIEW OF SYMPTOMS    Review of Systems   Constitutional:  Negative for chills and fatigue.   HENT:  Negative for congestion and sore throat.    Respiratory:  Negative for shortness of breath and wheezing.    Cardiovascular:  Negative for chest pain and palpitations.   Gastrointestinal:  Negative for abdominal pain and nausea.   Genitourinary:  Negative for frequency and urgency.   Neurological:  Negative for light-headedness.       PAST MEDICAL HISTORY  Past Medical History:   Diagnosis Date    Burning with urination     Excessive daytime sleepiness 12/13/2017    Fibromyalgia     GERD (gastroesophageal reflux disease)     Hernia of abdominal wall     Herpes simplex virus (HSV) infection     Hypertension     Hypertension     Morbid obesity (HCC) 12/13/2017    Obesity     Urinary frequency     Vaginal discharge     Vaginal irritation     Vaginal odor        FAMILY HISTORY  Family History   Problem Relation Age of Onset    Hypertension Mother     Hypertension Father     Hearing Loss Brother     High Blood Pressure Maternal Grandmother     Arthritis Maternal Grandfather     Breast Cancer Neg Hx     Ovarian Cancer Neg Hx        SOCIAL HISTORY  Social History     Socioeconomic History    Marital status: Single   Tobacco Use    Smoking status: Never    Smokeless tobacco: Never   Vaping Use    Vaping Use: Never used   Substance and Sexual Activity    Alcohol use: Yes     Comment: social. caffeine: 1 soda a week

## 2024-02-28 LAB
ALBUMIN SERPL-MCNC: 4.1 G/DL (ref 3.4–5)
ALP SERPL-CCNC: 71 U/L (ref 40–129)
ALT SERPL-CCNC: 27 U/L (ref 10–40)
AST SERPL-CCNC: 25 U/L (ref 15–37)
BILIRUB DIRECT SERPL-MCNC: <0.2 MG/DL (ref 0–0.3)
BILIRUB INDIRECT SERPL-MCNC: NORMAL MG/DL (ref 0–1)
BILIRUB SERPL-MCNC: 0.3 MG/DL (ref 0–1)
LIPASE SERPL-CCNC: 29 U/L (ref 13–60)
PROT SERPL-MCNC: 7 G/DL (ref 6.4–8.2)

## 2024-03-04 ENCOUNTER — OFFICE VISIT (OUTPATIENT)
Dept: OBGYN | Age: 44
End: 2024-03-04
Payer: COMMERCIAL

## 2024-03-04 VITALS
HEIGHT: 68 IN | BODY MASS INDEX: 37.59 KG/M2 | DIASTOLIC BLOOD PRESSURE: 111 MMHG | SYSTOLIC BLOOD PRESSURE: 167 MMHG | WEIGHT: 248 LBS

## 2024-03-04 DIAGNOSIS — R10.2 ACUTE PELVIC PAIN: ICD-10-CM

## 2024-03-04 DIAGNOSIS — N74 FEMALE PELVIC INFLAMMATORY DISORDERS IN DISEASES CLASSIFIED ELSEWHERE: ICD-10-CM

## 2024-03-04 DIAGNOSIS — A60.00 GENITAL HERPES SIMPLEX, UNSPECIFIED SITE: ICD-10-CM

## 2024-03-04 DIAGNOSIS — Z20.2 STD EXPOSURE: ICD-10-CM

## 2024-03-04 DIAGNOSIS — N89.8 VAGINAL ODOR: Primary | ICD-10-CM

## 2024-03-04 DIAGNOSIS — A59.01 TRICHOMONAL VAGINITIS: ICD-10-CM

## 2024-03-04 LAB
BILIRUBIN, POC: NORMAL
BLOOD URINE, POC: NEGATIVE
CLARITY, POC: NORMAL
COLOR, POC: NORMAL
DEPRECATED RDW RBC AUTO: 14.2 % (ref 12.4–15.4)
GLUCOSE URINE, POC: NEGATIVE
HCT VFR BLD AUTO: 39 % (ref 36–48)
HGB BLD-MCNC: 13.4 G/DL (ref 12–16)
KETONES, POC: NORMAL
LEUKOCYTE EST, POC: NEGATIVE
MCH RBC QN AUTO: 28 PG (ref 26–34)
MCHC RBC AUTO-ENTMCNC: 34.3 G/DL (ref 31–36)
MCV RBC AUTO: 81.6 FL (ref 80–100)
NITRITE, POC: NEGATIVE
PH, POC: NORMAL
PLATELET # BLD AUTO: 297 K/UL (ref 135–450)
PMV BLD AUTO: 9.3 FL (ref 5–10.5)
PROTEIN, POC: NEGATIVE
RBC # BLD AUTO: 4.78 M/UL (ref 4–5.2)
SPECIFIC GRAVITY, POC: NORMAL
UROBILINOGEN, POC: NORMAL
WBC # BLD AUTO: 8.2 K/UL (ref 4–11)

## 2024-03-04 PROCEDURE — 3077F SYST BP >= 140 MM HG: CPT | Performed by: OBSTETRICS & GYNECOLOGY

## 2024-03-04 PROCEDURE — G8427 DOCREV CUR MEDS BY ELIG CLIN: HCPCS | Performed by: OBSTETRICS & GYNECOLOGY

## 2024-03-04 PROCEDURE — 99214 OFFICE O/P EST MOD 30 MIN: CPT | Performed by: OBSTETRICS & GYNECOLOGY

## 2024-03-04 PROCEDURE — 81002 URINALYSIS NONAUTO W/O SCOPE: CPT | Performed by: OBSTETRICS & GYNECOLOGY

## 2024-03-04 PROCEDURE — G8484 FLU IMMUNIZE NO ADMIN: HCPCS | Performed by: OBSTETRICS & GYNECOLOGY

## 2024-03-04 PROCEDURE — 1036F TOBACCO NON-USER: CPT | Performed by: OBSTETRICS & GYNECOLOGY

## 2024-03-04 PROCEDURE — G8417 CALC BMI ABV UP PARAM F/U: HCPCS | Performed by: OBSTETRICS & GYNECOLOGY

## 2024-03-04 PROCEDURE — 36415 COLL VENOUS BLD VENIPUNCTURE: CPT | Performed by: OBSTETRICS & GYNECOLOGY

## 2024-03-04 PROCEDURE — 3080F DIAST BP >= 90 MM HG: CPT | Performed by: OBSTETRICS & GYNECOLOGY

## 2024-03-04 RX ORDER — VALACYCLOVIR HYDROCHLORIDE 1 G/1
1000 TABLET, FILM COATED ORAL DAILY
Qty: 30 TABLET | Refills: 11 | Status: SHIPPED | OUTPATIENT
Start: 2024-03-04

## 2024-03-04 ASSESSMENT — ENCOUNTER SYMPTOMS
ABDOMINAL PAIN: 0
SORE THROAT: 0
WHEEZING: 0
NAUSEA: 0
SHORTNESS OF BREATH: 0

## 2024-03-04 NOTE — PROGRESS NOTES
3/4/24    Dakota Ho  1980    Chief Complaint   Patient presents with    Other     Pt here for noemy +trichomonas. C/o vaginal odor, stomach pain. Denies discharge or itching. Requesting to have full std testing.         Dakota Ho is a 43 y.o. female who presents today for evaluation of std exposure, trhicmonas, and abdominal pain    Past Medical History:   Diagnosis Date    Burning with urination     Excessive daytime sleepiness 2017    Fibromyalgia     GERD (gastroesophageal reflux disease)     Hernia of abdominal wall     Herpes simplex virus (HSV) infection     Hypertension     Hypertension     Morbid obesity (HCC) 2017    Obesity     Urinary frequency     Vaginal discharge     Vaginal irritation     Vaginal odor        Past Surgical History:   Procedure Laterality Date     SECTION       &     CHOLECYSTECTOMY  08/15/2018    laprascopic    COLONOSCOPY      DILATION AND CURETTAGE      ENDOMETRIAL ABLATION      HERNIA REPAIR Right 2021    ROBOTIC EXPLORATION, HERNIA RIGHT INGUINAL REPAIR AND VENTRAL HERNIA REPAIR LAPAROSCOPIC performed by Alma Ju[facility]rez MD [facility] OR    HYSTERECTOMY (CERVIX STATUS UNKNOWN)      LAV    OVARY REMOVAL Right     SALPINGECTOMY Bilateral     UPPER GASTROINTESTINAL ENDOSCOPY  2017       Social History     Tobacco Use    Smoking status: Never    Smokeless tobacco: Never   Vaping Use    Vaping Use: Never used   Substance Use Topics    Alcohol use: Yes     Comment: social. caffeine: 1 soda a week    Drug use: No       Family History   Problem Relation Age of Onset    Hypertension Mother     Hypertension Father     Hearing Loss Brother     High Blood Pressure Maternal Grandmother     Arthritis Maternal Grandfather     Breast Cancer Neg Hx     Ovarian Cancer Neg Hx        Current Outpatient Medications   Medication Sig Dispense Refill    valACYclovir (VALTREX) 1 g tablet Take 1 tablet by mouth daily 30 tablet 11

## 2024-03-05 ENCOUNTER — TELEPHONE (OUTPATIENT)
Dept: CARDIOLOGY CLINIC | Age: 44
End: 2024-03-05

## 2024-03-05 LAB
BILIRUB UR QL STRIP.AUTO: ABNORMAL
C TRACH DNA CVX QL NAA+PROBE: NEGATIVE
CANDIDA DNA VAG QL NAA+PROBE: NORMAL
CLARITY UR: CLEAR
COLOR UR: YELLOW
G VAGINALIS DNA SPEC QL NAA+PROBE: NORMAL
GLUCOSE UR STRIP.AUTO-MCNC: NEGATIVE MG/DL
HBV SURFACE AG SERPL QL IA: NORMAL
HERPES SIMPLEX VIRUS 1 IGG: POSITIVE
HERPES SIMPLEX VIRUS 2 IGG: POSITIVE
HGB UR QL STRIP.AUTO: NEGATIVE
HIV 1+2 AB+HIV1 P24 AG SERPL QL IA: NORMAL
HIV 2 AB SERPL QL IA: NORMAL
HIV1 AB SERPL QL IA: NORMAL
HIV1 P24 AG SERPL QL IA: NORMAL
KETONES UR STRIP.AUTO-MCNC: NEGATIVE MG/DL
LEUKOCYTE ESTERASE UR QL STRIP.AUTO: NEGATIVE
N GONORRHOEA DNA CERV MUCUS QL NAA+PROBE: NEGATIVE
NITRITE UR QL STRIP.AUTO: NEGATIVE
PH UR STRIP.AUTO: 7 [PH] (ref 5–8)
PROT UR STRIP.AUTO-MCNC: NEGATIVE MG/DL
REAGIN+T PALLIDUM IGG+IGM SERPL-IMP: NORMAL
SP GR UR STRIP.AUTO: 1.02 (ref 1–1.03)
T VAGINALIS DNA VAG QL NAA+PROBE: NORMAL
UA COMPLETE W REFLEX CULTURE PNL UR: ABNORMAL
UA DIPSTICK W REFLEX MICRO PNL UR: ABNORMAL
URN SPEC COLLECT METH UR: ABNORMAL
UROBILINOGEN UR STRIP-ACNC: 0.2 E.U./DL

## 2024-03-05 NOTE — TELEPHONE ENCOUNTER
Cardiologist: Dr. Granados  Surgeon: Dr. Fernandes  Surgery: Left Shoulder Rotator Cuff Repair/Subacromial Decompression     Anesthesia: General  Date: 4/10/2024  FAX# [phone]  # [phone]    Last OV 2023 w/Darwin      Palpitations   Unclear etiology probablyExacerbated by stress , 7 days monitor was normal      Pre-syncope   She describes the event of right arm and leg getting weak that she felt lightheaded and weak and dizzy but did not really pass out , event monitor was normal   Grand mother  in her sleep      Morbid obesity (HCC)   She tried Adipex encouraged to lose weight with diet and exercise     Essential hypertension   Blood pressures is elevated today get echo tshows  LVH continue losartan 50 mg daily encouraged to check blood pressure at home watch salt intake get echo      Dyslipidemia :  All available lab work was reviewed.  Patient was advised to repeat lab work before next visit. Necessary orders were placed , instructions given by myself       Last EKG- 2023      Stress Test- 2023  Normal near maximal study negative for angina or ECG evidence of ischemia.   Exercise tolerance is good. The patient exercised according to the [facility] for   7:01 min:s, achieving a work level of Max. METS: 8.50.   Baseline hypertension with appropriate response to exercise noted.      Echo- 2023  Left ventricular function is low normal, EF is estimated at 50-55%.   Left ventricle size is normal.   Moderate left ventricular hypertrophy.   Grade I diastolic dysfunction.   Mild mitral, tricuspid and pulmonic regurgitation is present.   RVSP is 25 mmHg.   No evidence of pericardial effusion.

## 2024-03-05 NOTE — TELEPHONE ENCOUNTER
Proceed with surgery no further testing needed   Low  risk for surgery  Hold aspirin for procedure

## 2024-03-12 ENCOUNTER — TELEPHONE (OUTPATIENT)
Dept: OBGYN | Age: 44
End: 2024-03-12

## 2024-03-12 NOTE — TELEPHONE ENCOUNTER
Pt states she saw her results from her 3/4/2024 visit. Pt states she is still experiencing vaginal odor. Pt requesting Flagyl. Please advise.

## 2024-03-14 NOTE — TELEPHONE ENCOUNTER
All cultures were negative on March 4, 2024.  Recommend repeat office visit for repeat cultures and addition of Ureaplasma and mycoplasma.  Okay to schedule tomorrow morning at 815 or 830 with me or next week.

## 2024-03-19 ENCOUNTER — OFFICE VISIT (OUTPATIENT)
Dept: OBGYN | Age: 44
End: 2024-03-19
Payer: COMMERCIAL

## 2024-03-19 VITALS
BODY MASS INDEX: 39.55 KG/M2 | HEART RATE: 73 BPM | DIASTOLIC BLOOD PRESSURE: 100 MMHG | WEIGHT: 252 LBS | HEIGHT: 67 IN | SYSTOLIC BLOOD PRESSURE: 140 MMHG

## 2024-03-19 DIAGNOSIS — N74 FEMALE PELVIC INFLAMMATORY DISORDERS IN DISEASES CLASSIFIED ELSEWHERE: ICD-10-CM

## 2024-03-19 DIAGNOSIS — R82.90 ABNORMAL URINE ODOR: ICD-10-CM

## 2024-03-19 DIAGNOSIS — N89.8 VAGINAL ODOR: Primary | ICD-10-CM

## 2024-03-19 PROCEDURE — 3080F DIAST BP >= 90 MM HG: CPT | Performed by: OBSTETRICS & GYNECOLOGY

## 2024-03-19 PROCEDURE — 1036F TOBACCO NON-USER: CPT | Performed by: OBSTETRICS & GYNECOLOGY

## 2024-03-19 PROCEDURE — G8484 FLU IMMUNIZE NO ADMIN: HCPCS | Performed by: OBSTETRICS & GYNECOLOGY

## 2024-03-19 PROCEDURE — G8417 CALC BMI ABV UP PARAM F/U: HCPCS | Performed by: OBSTETRICS & GYNECOLOGY

## 2024-03-19 PROCEDURE — 3077F SYST BP >= 140 MM HG: CPT | Performed by: OBSTETRICS & GYNECOLOGY

## 2024-03-19 PROCEDURE — 99213 OFFICE O/P EST LOW 20 MIN: CPT | Performed by: OBSTETRICS & GYNECOLOGY

## 2024-03-19 PROCEDURE — G8427 DOCREV CUR MEDS BY ELIG CLIN: HCPCS | Performed by: OBSTETRICS & GYNECOLOGY

## 2024-03-19 NOTE — PROGRESS NOTES
Urine [[id number]]   Urine    Component Value   C. trachomatis DNA ,Urine Negative   N. gonorrhoeae DNA, Urine Negative          05/16/2023 1638 05/19/2023 1103 Culture, Urine [[id number]]     (Abnormal)   Urine, clean catch    Component Value   Organism Proteus mirabilis Abnormal    Urine Culture, Routine >100,000 CFU/ml              ASSESSMENT AND PLAN   Diagnosis Orders   1. Vaginal odor  Sexual Health Organism ID by PCR      2. Female pelvic inflammatory disorders in diseases classified elsewhere  Sexual Health Organism ID by PCR      3. Abnormal urine odor  Urinalysis with Microscopic    Culture, Urine        Handout vaginal hygiene  Try acidophilus and boric acid suppository  Return if symptoms worsen or fail to improve.    Jaleel Menezes MD

## 2024-03-20 LAB
BACTERIA URNS QL MICRO: NORMAL /HPF
BILIRUB UR QL STRIP.AUTO: NEGATIVE
CLARITY UR: CLEAR
COLOR UR: YELLOW
EPI CELLS #/AREA URNS AUTO: 4 /HPF (ref 0–5)
GLUCOSE UR STRIP.AUTO-MCNC: NEGATIVE MG/DL
HGB UR QL STRIP.AUTO: NEGATIVE
HYALINE CASTS #/AREA URNS AUTO: 1 /LPF (ref 0–8)
KETONES UR STRIP.AUTO-MCNC: NEGATIVE MG/DL
LEUKOCYTE ESTERASE UR QL STRIP.AUTO: NEGATIVE
NITRITE UR QL STRIP.AUTO: NEGATIVE
PH UR STRIP.AUTO: 6.5 [PH] (ref 5–8)
PROT UR STRIP.AUTO-MCNC: NEGATIVE MG/DL
RBC CLUMPS #/AREA URNS AUTO: 1 /HPF (ref 0–4)
SP GR UR STRIP.AUTO: 1.02 (ref 1–1.03)
UA DIPSTICK W REFLEX MICRO PNL UR: NORMAL
URN SPEC COLLECT METH UR: NORMAL
UROBILINOGEN UR STRIP-ACNC: 0.2 E.U./DL
WBC #/AREA URNS AUTO: 1 /HPF (ref 0–5)

## 2024-03-21 LAB — BACTERIA UR CULT: NORMAL

## 2024-03-22 LAB
C TRACH DNA SPEC QL NAA+PROBE: NOT DETECTED
CANDIDA RRNA VAG QL PROBE: NOT DETECTED
CANDIDA RRNA VAG QL PROBE: NOT DETECTED
CARBAPENEM RESISTANCE OXA-48 GENE BY PCR: NOT DETECTED
CEPHALOSPORIN RESISTANCE AMPC GENE: NOT DETECTED
ESBL RESISTANCE: NOT DETECTED
G VAGINALIS RRNA GENITAL QL PROBE: NOT DETECTED
M HOMINIS DNA BLD QL NAA+PROBE: NOT DETECTED
MACROLIDE RESISTANCE: NOT DETECTED
METHICILLIN RESISTANCE: NOT DETECTED
MYCOPLASMA DNA SPEC QL NAA+PROBE: NOT DETECTED
N GONORRHOEA DNA SPEC QL NAA+PROBE: NOT DETECTED
OTHER MICROORG DNA SPEC QL NAA+PROBE: NOT DETECTED
OTHER MICROORG DNA SPEC QL NAA+PROBE: NOT DETECTED
QUINOLONE AND FLUOROQUINOLONE RESISTANCE: NOT DETECTED
T VAGINALIS RRNA SPEC QL NAA+PROBE: NOT DETECTED
TETRACYCLINE RESISTANCE: NOT DETECTED
TRIMETHOPRIM/SULFONAMIDE RESISTANCE: NOT DETECTED

## 2024-04-01 ENCOUNTER — OFFICE VISIT (OUTPATIENT)
Dept: FAMILY MEDICINE CLINIC | Age: 44
End: 2024-04-01
Payer: COMMERCIAL

## 2024-04-01 VITALS
OXYGEN SATURATION: 99 % | WEIGHT: 249.3 LBS | HEART RATE: 78 BPM | SYSTOLIC BLOOD PRESSURE: 134 MMHG | DIASTOLIC BLOOD PRESSURE: 86 MMHG | HEIGHT: 67 IN | BODY MASS INDEX: 39.13 KG/M2

## 2024-04-01 DIAGNOSIS — K58.1 IRRITABLE BOWEL SYNDROME WITH CONSTIPATION: ICD-10-CM

## 2024-04-01 DIAGNOSIS — Z98.890 STATUS POST LEFT ROTATOR CUFF REPAIR: ICD-10-CM

## 2024-04-01 DIAGNOSIS — I10 ESSENTIAL HYPERTENSION: Primary | ICD-10-CM

## 2024-04-01 DIAGNOSIS — M15.9 PRIMARY OSTEOARTHRITIS INVOLVING MULTIPLE JOINTS: ICD-10-CM

## 2024-04-01 PROCEDURE — G8427 DOCREV CUR MEDS BY ELIG CLIN: HCPCS | Performed by: STUDENT IN AN ORGANIZED HEALTH CARE EDUCATION/TRAINING PROGRAM

## 2024-04-01 PROCEDURE — 3075F SYST BP GE 130 - 139MM HG: CPT | Performed by: STUDENT IN AN ORGANIZED HEALTH CARE EDUCATION/TRAINING PROGRAM

## 2024-04-01 PROCEDURE — G8417 CALC BMI ABV UP PARAM F/U: HCPCS | Performed by: STUDENT IN AN ORGANIZED HEALTH CARE EDUCATION/TRAINING PROGRAM

## 2024-04-01 PROCEDURE — 1036F TOBACCO NON-USER: CPT | Performed by: STUDENT IN AN ORGANIZED HEALTH CARE EDUCATION/TRAINING PROGRAM

## 2024-04-01 PROCEDURE — 3079F DIAST BP 80-89 MM HG: CPT | Performed by: STUDENT IN AN ORGANIZED HEALTH CARE EDUCATION/TRAINING PROGRAM

## 2024-04-01 PROCEDURE — 99214 OFFICE O/P EST MOD 30 MIN: CPT | Performed by: STUDENT IN AN ORGANIZED HEALTH CARE EDUCATION/TRAINING PROGRAM

## 2024-04-01 ASSESSMENT — ENCOUNTER SYMPTOMS
SORE THROAT: 0
SHORTNESS OF BREATH: 0
WHEEZING: 0
NAUSEA: 0
ABDOMINAL PAIN: 0

## 2024-04-01 NOTE — PROGRESS NOTES
4/1/2024    Dakota LANDEROS Vic    Chief Complaint   Patient presents with    Follow-up     IBS, HTN        HPI  History was obtained from patient.  Dakota is a 43 y.o. female with a PMHx as listed below who presents today for     Plan for suregery April 10 left shoulder rotator cuff repair. Plan to be out of work for 3 months  1. Essential hypertension    2. Irritable bowel syndrome with constipation    3. Status post left rotator cuff repair    4. Primary osteoarthritis involving multiple joints             REVIEW OF SYMPTOMS    Review of Systems   Constitutional:  Negative for chills and fatigue.   HENT:  Negative for congestion and sore throat.    Respiratory:  Negative for shortness of breath and wheezing.    Cardiovascular:  Negative for chest pain and palpitations.   Gastrointestinal:  Negative for abdominal pain and nausea.   Genitourinary:  Negative for frequency and urgency.   Neurological:  Negative for light-headedness.       PAST MEDICAL HISTORY  Past Medical History:   Diagnosis Date    Burning with urination     Excessive daytime sleepiness 12/13/2017    Fibromyalgia     GERD (gastroesophageal reflux disease)     Hernia of abdominal wall     Herpes simplex virus (HSV) infection     Hypertension     Hypertension     Morbid obesity (HCC) 12/13/2017    Obesity     Urinary frequency     Vaginal discharge     Vaginal irritation     Vaginal odor        FAMILY HISTORY  Family History   Problem Relation Age of Onset    Hypertension Mother     Hypertension Father     Hearing Loss Brother     High Blood Pressure Maternal Grandmother     Arthritis Maternal Grandfather     Breast Cancer Neg Hx     Ovarian Cancer Neg Hx        SOCIAL HISTORY  Social History     Socioeconomic History    Marital status: Single   Tobacco Use    Smoking status: Never    Smokeless tobacco: Never   Vaping Use    Vaping Use: Never used   Substance and Sexual Activity    Alcohol use: Yes     Comment: social. caffeine: 1 soda a week

## 2024-06-11 ENCOUNTER — OFFICE VISIT (OUTPATIENT)
Dept: OBGYN | Age: 44
End: 2024-06-11
Payer: COMMERCIAL

## 2024-06-11 VITALS
SYSTOLIC BLOOD PRESSURE: 148 MMHG | WEIGHT: 252 LBS | HEIGHT: 67 IN | BODY MASS INDEX: 39.55 KG/M2 | DIASTOLIC BLOOD PRESSURE: 103 MMHG

## 2024-06-11 DIAGNOSIS — N89.8 VAGINAL ODOR: Primary | ICD-10-CM

## 2024-06-11 PROCEDURE — 99212 OFFICE O/P EST SF 10 MIN: CPT

## 2024-06-11 PROCEDURE — 99459 PELVIC EXAMINATION: CPT

## 2024-06-11 PROCEDURE — 3080F DIAST BP >= 90 MM HG: CPT

## 2024-06-11 PROCEDURE — 3077F SYST BP >= 140 MM HG: CPT

## 2024-06-11 SDOH — ECONOMIC STABILITY: INCOME INSECURITY: HOW HARD IS IT FOR YOU TO PAY FOR THE VERY BASICS LIKE FOOD, HOUSING, MEDICAL CARE, AND HEATING?: NOT VERY HARD

## 2024-06-11 SDOH — ECONOMIC STABILITY: FOOD INSECURITY: WITHIN THE PAST 12 MONTHS, YOU WORRIED THAT YOUR FOOD WOULD RUN OUT BEFORE YOU GOT MONEY TO BUY MORE.: NEVER TRUE

## 2024-06-11 SDOH — ECONOMIC STABILITY: FOOD INSECURITY: WITHIN THE PAST 12 MONTHS, THE FOOD YOU BOUGHT JUST DIDN'T LAST AND YOU DIDN'T HAVE MONEY TO GET MORE.: NEVER TRUE

## 2024-06-11 ASSESSMENT — ENCOUNTER SYMPTOMS
CHEST TIGHTNESS: 0
NAUSEA: 0
CONSTIPATION: 0
ABDOMINAL PAIN: 0
GASTROINTESTINAL NEGATIVE: 1
DIARRHEA: 0
RESPIRATORY NEGATIVE: 1
SHORTNESS OF BREATH: 0
VOMITING: 0

## 2024-06-11 NOTE — PROGRESS NOTES
24    Dakota Ho  1980    Chief Complaint   Patient presents with    Other     Pt c/o vaginal odor and burning x 1 month, denies discharge. Pt had surgery on her arm and wasn't able to wipe well all the time.         Dakota Ho is a 44 y.o. female who presents today for evaluation of vaginal odor for 1 month. Has had some vaginal burning and dysuria the past couple days. Had UA done yesterday at nephrologist, was normal. History of BV.    Past Medical History:   Diagnosis Date    Burning with urination     Excessive daytime sleepiness 2017    Fibromyalgia     GERD (gastroesophageal reflux disease)     Hernia of abdominal wall     Herpes simplex virus (HSV) infection     Hypertension     Hypertension     Morbid obesity (HCC) 2017    Obesity     Urinary frequency     UTI (urinary tract infection)     Vaginal discharge     Vaginal irritation     Vaginal odor        Past Surgical History:   Procedure Laterality Date     SECTION       &     CHOLECYSTECTOMY  08/15/2018    laprascopic    COLONOSCOPY      DILATION AND CURETTAGE      ENDOMETRIAL ABLATION      HERNIA REPAIR Right 2021    ROBOTIC EXPLORATION, HERNIA RIGHT INGUINAL REPAIR AND VENTRAL HERNIA REPAIR LAPAROSCOPIC performed by Alma Juárez MD at [facility] OR    HYSTERECTOMY (CERVIX STATUS UNKNOWN)      LAV    OVARY REMOVAL Right     SALPINGECTOMY Bilateral     UPPER GASTROINTESTINAL ENDOSCOPY  2017       Social History     Tobacco Use    Smoking status: Never    Smokeless tobacco: Never   Vaping Use    Vaping Use: Never used   Substance Use Topics    Alcohol use: Yes     Comment: social. caffeine: 1 soda a week    Drug use: No       Family History   Problem Relation Age of Onset    Hypertension Mother     Hypertension Father     Hearing Loss Brother     High Blood Pressure Maternal Grandmother     Arthritis Maternal Grandfather     Breast Cancer Neg Hx     Ovarian Cancer Neg Hx        Current

## 2024-06-12 LAB
CANDIDA DNA VAG QL NAA+PROBE: NORMAL
G VAGINALIS DNA SPEC QL NAA+PROBE: NORMAL
T VAGINALIS DNA VAG QL NAA+PROBE: NORMAL

## 2024-06-18 ENCOUNTER — OFFICE VISIT (OUTPATIENT)
Dept: OBGYN | Age: 44
End: 2024-06-18
Payer: COMMERCIAL

## 2024-06-18 VITALS
SYSTOLIC BLOOD PRESSURE: 141 MMHG | WEIGHT: 246 LBS | DIASTOLIC BLOOD PRESSURE: 102 MMHG | HEIGHT: 67 IN | BODY MASS INDEX: 38.61 KG/M2

## 2024-06-18 DIAGNOSIS — N74 FEMALE PELVIC INFLAMMATORY DISORDERS IN DISEASES CLASSIFIED ELSEWHERE: ICD-10-CM

## 2024-06-18 DIAGNOSIS — N89.8 VAGINAL DISCHARGE: ICD-10-CM

## 2024-06-18 DIAGNOSIS — Z01.419 ENCOUNTER FOR ANNUAL ROUTINE GYNECOLOGICAL EXAMINATION: Primary | ICD-10-CM

## 2024-06-18 DIAGNOSIS — Z12.31 SCREENING MAMMOGRAM FOR BREAST CANCER: ICD-10-CM

## 2024-06-18 DIAGNOSIS — R30.0 DYSURIA: ICD-10-CM

## 2024-06-18 PROCEDURE — 3077F SYST BP >= 140 MM HG: CPT | Performed by: OBSTETRICS & GYNECOLOGY

## 2024-06-18 PROCEDURE — 99396 PREV VISIT EST AGE 40-64: CPT | Performed by: OBSTETRICS & GYNECOLOGY

## 2024-06-18 PROCEDURE — 3080F DIAST BP >= 90 MM HG: CPT | Performed by: OBSTETRICS & GYNECOLOGY

## 2024-06-18 RX ORDER — METRONIDAZOLE 500 MG/1
500 TABLET ORAL 2 TIMES DAILY
Qty: 14 TABLET | Refills: 0 | Status: SHIPPED | OUTPATIENT
Start: 2024-06-18 | End: 2024-06-25

## 2024-06-18 NOTE — PROGRESS NOTES
CollectedUpdatedProcedure  06/11/2024 736142/12/2024 1135  Vaginal Pathogens Probes *A [[id number]]    Vaginal   Component Value   Trichomonas Vaginalis DNA Negative DNA not detected.  Normal range: Negative DNA not detected.   Gardnerella Vaginalis, DNA Probe Negative DNA not detected.  Normal range: Negative DNA not detected.   Candida Species, DNA Probe Negative DNA not detected.  Normal range: Negative DNA not detected.   03/19/2024 [id number]/22/2024 0935  Sexual Health Organism ID by PCR [[id number]]   Swab   Component Value   Candida parapsilosis Not Detected   Candida spp Not Detected   Chlamydia Trachomatis By RT-PCR Not Detected   GARDNERELLA VAGINALIS Not Detected   Lactobacillus Spp. Not Detected   Mycoplasma Genitalium PCR Not Detected   Mycoplasma hominis Not Detected   Neisseria Gonorrhoeae By RT-PCR Not Detected   TRICHOMONAS VAGINALIS Not Detected   Ureaplasma urealyticum, PCR Not Detected   Carbapenem Resistance OXA-48 gene by PCR Not Detected   Cephalosporin Resistance AmpC gene Not Detected   ESBL Resistance Not Detected   Macrolide Resistance Not Detected   Methicillin Resistance Not Detected   Quinolone and Fluoroquinolone Resistance Not Detected   Tetracycline Resistance Not Detected   Trimethoprim/Sulfonamide Resistance Not Detected    03/04/2024 384516/05/2024 1219  Vaginal Pathogens Probes *A [[id number]]    Vaginal   Component Value   Trichomonas Vaginalis DNA Negative DNA not detected.  Normal range: Negative DNA not detected.   Gardnerella Vaginalis, DNA Probe Negative DNA not detected.  Normal range: Negative DNA not detected.   Candida Species, DNA Probe Negative DNA not detected.  Normal range: Negative DNA not detected.   12/18/2023 [id number]/19/2023 1411  Vaginal Pathogens Probes *A [[id number]]    (Abnormal)   Vaginal   Component Value   Trichomonas Vaginalis DNA POSITIVE DNA Probe detected.  Normal range: Negative DNA not detected.   Abnormal    Gardnerella Vaginalis, DNA Probe Negative

## 2024-06-19 LAB
BACTERIA URNS QL MICRO: ABNORMAL /HPF
BILIRUB UR QL STRIP.AUTO: NEGATIVE
CLARITY UR: CLEAR
COLOR UR: YELLOW
EPI CELLS #/AREA URNS AUTO: 1 /HPF (ref 0–5)
GLUCOSE UR STRIP.AUTO-MCNC: NEGATIVE MG/DL
HGB UR QL STRIP.AUTO: NEGATIVE
HYALINE CASTS #/AREA URNS AUTO: 0 /LPF (ref 0–8)
KETONES UR STRIP.AUTO-MCNC: NEGATIVE MG/DL
LEUKOCYTE ESTERASE UR QL STRIP.AUTO: NEGATIVE
NITRITE UR QL STRIP.AUTO: NEGATIVE
PH UR STRIP.AUTO: 6 [PH] (ref 5–8)
PROT UR STRIP.AUTO-MCNC: ABNORMAL MG/DL
RBC CLUMPS #/AREA URNS AUTO: 2 /HPF (ref 0–4)
SP GR UR STRIP.AUTO: 1.02 (ref 1–1.03)
UA DIPSTICK W REFLEX MICRO PNL UR: YES
URN SPEC COLLECT METH UR: ABNORMAL
UROBILINOGEN UR STRIP-ACNC: 0.2 E.U./DL
WBC #/AREA URNS AUTO: 1 /HPF (ref 0–5)

## 2024-06-20 LAB
BACTERIA UR CULT: NORMAL
C TRACH DNA SPEC QL NAA+PROBE: NOT DETECTED
CANDIDA RRNA VAG QL PROBE: NOT DETECTED
CANDIDA RRNA VAG QL PROBE: NOT DETECTED
CARBAPENEM RESISTANCE OXA-48 GENE BY PCR: NOT DETECTED
CEPHALOSPORIN RESISTANCE AMPC GENE: NOT DETECTED
ESBL RESISTANCE: NOT DETECTED
G VAGINALIS RRNA GENITAL QL PROBE: ABNORMAL
M HOMINIS DNA BLD QL NAA+PROBE: NOT DETECTED
MACROLIDE RESISTANCE: ABNORMAL
METHICILLIN RESISTANCE: ABNORMAL
MYCOPLASMA DNA SPEC QL NAA+PROBE: NOT DETECTED
N GONORRHOEA DNA SPEC QL NAA+PROBE: NOT DETECTED
OTHER MICROORG DNA SPEC QL NAA+PROBE: ABNORMAL
OTHER MICROORG DNA SPEC QL NAA+PROBE: NOT DETECTED
QUINOLONE AND FLUOROQUINOLONE RESISTANCE: ABNORMAL
T VAGINALIS RRNA SPEC QL NAA+PROBE: NOT DETECTED
TETRACYCLINE RESISTANCE: ABNORMAL
TRIMETHOPRIM/SULFONAMIDE RESISTANCE: ABNORMAL

## 2024-06-28 ENCOUNTER — OFFICE VISIT (OUTPATIENT)
Dept: FAMILY MEDICINE CLINIC | Age: 44
End: 2024-06-28
Payer: COMMERCIAL

## 2024-06-28 VITALS
DIASTOLIC BLOOD PRESSURE: 84 MMHG | SYSTOLIC BLOOD PRESSURE: 122 MMHG | WEIGHT: 247.4 LBS | BODY MASS INDEX: 38.83 KG/M2 | HEART RATE: 79 BPM | OXYGEN SATURATION: 98 % | HEIGHT: 67 IN

## 2024-06-28 DIAGNOSIS — R09.82 PND (POST-NASAL DRIP): ICD-10-CM

## 2024-06-28 DIAGNOSIS — R09.81 NASAL CONGESTION: Primary | ICD-10-CM

## 2024-06-28 PROCEDURE — 3079F DIAST BP 80-89 MM HG: CPT

## 2024-06-28 PROCEDURE — 1036F TOBACCO NON-USER: CPT

## 2024-06-28 PROCEDURE — G8427 DOCREV CUR MEDS BY ELIG CLIN: HCPCS

## 2024-06-28 PROCEDURE — G8417 CALC BMI ABV UP PARAM F/U: HCPCS

## 2024-06-28 PROCEDURE — 3074F SYST BP LT 130 MM HG: CPT

## 2024-06-28 PROCEDURE — 99213 OFFICE O/P EST LOW 20 MIN: CPT

## 2024-06-28 RX ORDER — FLUTICASONE PROPIONATE 50 MCG
1 SPRAY, SUSPENSION (ML) NASAL 2 TIMES DAILY
Qty: 32 G | Refills: 1 | Status: SHIPPED | OUTPATIENT
Start: 2024-06-28

## 2024-06-28 ASSESSMENT — ENCOUNTER SYMPTOMS
RESPIRATORY NEGATIVE: 1
GASTROINTESTINAL NEGATIVE: 1

## 2024-06-28 NOTE — PROGRESS NOTES
6/28/2024    Dakota LANDEROS [facility]    Chief Complaint   Patient presents with    Otalgia     Both ears, started on Monday, says her ears feel like they're ringing.     Congestion     Has been taking ibuprofen and tylenol, tried taking an allergy pill yesterday.     Cough    Pharyngitis       HPI  History was obtained from patient.  Dakota is a pleasant 44 y.o. female who presents today for ear ringing coming and going. She then developed a fever and sore throat with nasal congestion. Non-productive cough. The only time she gets any mucus up is when she is brushing her teeth. Mild SOB.  Symptoms started Monday.    1. Nasal congestion    2. PND (post-nasal drip)       REVIEW OF SYMPTOMS    Review of Systems   Constitutional:  Positive for fatigue.   HENT:  Positive for congestion, ear pain and postnasal drip.    Respiratory: Negative.     Cardiovascular: Negative.    Gastrointestinal: Negative.        PAST MEDICAL HISTORY  Past Medical History:   Diagnosis Date    Burning with urination     Excessive daytime sleepiness 12/13/2017    Fibromyalgia     GERD (gastroesophageal reflux disease)     Hernia of abdominal wall     Herpes simplex virus (HSV) infection     Hypertension     Hypertension     Morbid obesity (HCC) 12/13/2017    Obesity     Urinary frequency     UTI (urinary tract infection)     Vaginal discharge     Vaginal irritation     Vaginal odor        FAMILY HISTORY  Family History   Problem Relation Age of Onset    Hypertension Mother     Hypertension Father     Hearing Loss Brother     High Blood Pressure Maternal Grandmother     Arthritis Maternal Grandfather     Breast Cancer Neg Hx     Ovarian Cancer Neg Hx        SOCIAL HISTORY  Social History     Socioeconomic History    Marital status: Single     Spouse name: None    Number of children: None    Years of education: None    Highest education level: None   Tobacco Use    Smoking status: Never    Smokeless tobacco: Never   Vaping Use    Vaping Use: Never

## 2024-07-08 ENCOUNTER — TELEPHONE (OUTPATIENT)
Dept: OBGYN | Age: 44
End: 2024-07-08

## 2024-07-23 ENCOUNTER — OFFICE VISIT (OUTPATIENT)
Dept: CARDIOLOGY CLINIC | Age: 44
End: 2024-07-23
Payer: COMMERCIAL

## 2024-07-23 VITALS
SYSTOLIC BLOOD PRESSURE: 136 MMHG | HEART RATE: 65 BPM | WEIGHT: 250.6 LBS | BODY MASS INDEX: 37.98 KG/M2 | DIASTOLIC BLOOD PRESSURE: 86 MMHG | HEIGHT: 68 IN

## 2024-07-23 DIAGNOSIS — E66.01 MORBID OBESITY (HCC): ICD-10-CM

## 2024-07-23 DIAGNOSIS — R00.2 PALPITATIONS: ICD-10-CM

## 2024-07-23 DIAGNOSIS — I10 ESSENTIAL HYPERTENSION: Primary | ICD-10-CM

## 2024-07-23 PROBLEM — K81.0 ACUTE CHOLECYSTITIS: Status: RESOLVED | Noted: 2018-08-14 | Resolved: 2024-07-23

## 2024-07-23 PROBLEM — G89.18 ACUTE POST-OPERATIVE PAIN: Status: RESOLVED | Noted: 2018-08-16 | Resolved: 2024-07-23

## 2024-07-23 PROCEDURE — 99214 OFFICE O/P EST MOD 30 MIN: CPT | Performed by: NURSE PRACTITIONER

## 2024-07-23 PROCEDURE — 1036F TOBACCO NON-USER: CPT | Performed by: NURSE PRACTITIONER

## 2024-07-23 PROCEDURE — 93000 ELECTROCARDIOGRAM COMPLETE: CPT | Performed by: NURSE PRACTITIONER

## 2024-07-23 PROCEDURE — 3079F DIAST BP 80-89 MM HG: CPT | Performed by: NURSE PRACTITIONER

## 2024-07-23 PROCEDURE — G8417 CALC BMI ABV UP PARAM F/U: HCPCS | Performed by: NURSE PRACTITIONER

## 2024-07-23 PROCEDURE — G8427 DOCREV CUR MEDS BY ELIG CLIN: HCPCS | Performed by: NURSE PRACTITIONER

## 2024-07-23 PROCEDURE — 3075F SYST BP GE 130 - 139MM HG: CPT | Performed by: NURSE PRACTITIONER

## 2024-07-23 ASSESSMENT — ENCOUNTER SYMPTOMS
COUGH: 0
SHORTNESS OF BREATH: 0

## 2024-07-23 NOTE — PROGRESS NOTES
CARDIOLOGY  NOTE    2024    Dakota LANDEROS Vic (:  1980) is a 44 y.o. female,an established patient with Dr. Granados, here for evaluation of the following chief complaint(s):  1 Year Follow Up (Pt denies any new cardiac symptoms )        SUBJECTIVE/OBJECTIVE:    SANTI Duncan is here to follow up on her cardiovascular health.     She states that she has been great. She is not having as much stress. She thinks her blood pressure medication was cauing the palpitations.    Dakota has a history of HTN, obesity, and palpitations.     She is a non smoker. She denies any issues with obtaining taking or side effects from medications.       Review of Systems   Constitutional:  Negative for fatigue and fever.   Respiratory:  Negative for cough and shortness of breath.    Cardiovascular:  Negative for chest pain, palpitations and leg swelling.   Musculoskeletal:  Negative for arthralgias and gait problem.   Neurological:  Negative for dizziness, syncope, weakness, light-headedness and headaches.       Vitals:    24 0946   BP: 136/86   Site: Left Upper Arm   Position: Sitting   Cuff Size: Medium Adult   Pulse: 65   Weight: 113.7 kg (250 lb 9.6 oz)   Height: 1.727 m (5' 8\")       Wt Readings from Last 3 Encounters:   24 113.7 kg (250 lb 9.6 oz)   24 112.2 kg (247 lb 6.4 oz)   24 111.6 kg (246 lb)       BP Readings from Last 3 Encounters:   24 136/86   24 122/84   24 (!) 141/102       Prior to Admission medications    Medication Sig Start Date End Date Taking? Authorizing Provider   terconazole (TERAZOL 7) 0.4 % vaginal cream Place vaginally nightly. 7/10/24  Yes Jaleel Menezes MD   fluticasone (FLONASE) 50 MCG/ACT nasal spray 1 spray by Each Nostril route in the morning and at bedtime 24  Yes Raissa Flores APRN - CNP   dilTIAZem (CARDIZEM CD) 240 MG extended release capsule Take 1 capsule by mouth daily 24  Yes Nikolai Braden MD   cloNIDine

## 2024-07-23 NOTE — PATIENT INSTRUCTIONS
**It is YOUR responsibilty to bring medication bottles and/or updated medication list to EACH APPOINTMENT. This will allow us to better serve you and all your healthcare needs**  Thank you for allowing us to care for you today!   We want to ensure we can follow your treatment plan and we strive to give you the best outcomes and experience possible.   If you ever have a life threatening emergency and call 911 - for an ambulance (EMS)   Our providers can only care for you at:   [facility] or [facility].   Even if you have someone take you or you drive yourself we can only care for you in a [facility]. Our providers are not setup at the other healthcare locations!   Please be informed that if you contact our office outside of normal business hours the physician on call cannot help with any scheduling or rescheduling issues, procedure instruction questions or any type of medication issue.    We advise you for any urgent/emergency that you go to the nearest emergency room!    PLEASE CALL OUR OFFICE DURING NORMAL BUSINESS HOURS    Monday - Friday   8 am to 5 pm    [place]: [phone]    [place]: [phone]    [place]:  [phone]  We are committed to providing you the best care possible.    If you receive a survey after visiting one of our offices, please take time to share your experience concerning your physician office visit.  These surveys are confidential and no health information about you is shared.    We are eager to improve for you and we are counting on your feedback to help make that happen.

## 2024-09-23 ENCOUNTER — OFFICE VISIT (OUTPATIENT)
Dept: OBGYN | Age: 44
End: 2024-09-23
Payer: COMMERCIAL

## 2024-09-23 VITALS
WEIGHT: 248 LBS | HEIGHT: 68 IN | SYSTOLIC BLOOD PRESSURE: 148 MMHG | BODY MASS INDEX: 37.59 KG/M2 | DIASTOLIC BLOOD PRESSURE: 99 MMHG | HEART RATE: 80 BPM

## 2024-09-23 DIAGNOSIS — N32.81 OVERACTIVE BLADDER: ICD-10-CM

## 2024-09-23 DIAGNOSIS — R10.2 PELVIC CRAMPING: Primary | ICD-10-CM

## 2024-09-23 PROCEDURE — G8417 CALC BMI ABV UP PARAM F/U: HCPCS | Performed by: OBSTETRICS & GYNECOLOGY

## 2024-09-23 PROCEDURE — 99214 OFFICE O/P EST MOD 30 MIN: CPT | Performed by: OBSTETRICS & GYNECOLOGY

## 2024-09-23 PROCEDURE — 3077F SYST BP >= 140 MM HG: CPT | Performed by: OBSTETRICS & GYNECOLOGY

## 2024-09-23 PROCEDURE — 1036F TOBACCO NON-USER: CPT | Performed by: OBSTETRICS & GYNECOLOGY

## 2024-09-23 PROCEDURE — 3080F DIAST BP >= 90 MM HG: CPT | Performed by: OBSTETRICS & GYNECOLOGY

## 2024-09-23 PROCEDURE — G8427 DOCREV CUR MEDS BY ELIG CLIN: HCPCS | Performed by: OBSTETRICS & GYNECOLOGY

## 2024-09-23 RX ORDER — SOLIFENACIN SUCCINATE 5 MG/1
5 TABLET, FILM COATED ORAL DAILY
Qty: 30 TABLET | Refills: 3 | Status: SHIPPED | OUTPATIENT
Start: 2024-09-23

## 2024-09-24 LAB
BACTERIA URNS QL MICRO: ABNORMAL /HPF
BILIRUB UR QL STRIP.AUTO: NEGATIVE
CANDIDA DNA VAG QL NAA+PROBE: NORMAL
CLARITY UR: CLEAR
COLOR UR: YELLOW
EPI CELLS #/AREA URNS AUTO: 6 /HPF (ref 0–5)
G VAGINALIS DNA SPEC QL NAA+PROBE: NORMAL
GLUCOSE UR STRIP.AUTO-MCNC: NEGATIVE MG/DL
HGB UR QL STRIP.AUTO: NEGATIVE
HYALINE CASTS #/AREA URNS AUTO: 0 /LPF (ref 0–8)
KETONES UR STRIP.AUTO-MCNC: ABNORMAL MG/DL
LEUKOCYTE ESTERASE UR QL STRIP.AUTO: NEGATIVE
NITRITE UR QL STRIP.AUTO: NEGATIVE
PH UR STRIP.AUTO: 6 [PH] (ref 5–8)
PROT UR STRIP.AUTO-MCNC: ABNORMAL MG/DL
RBC CLUMPS #/AREA URNS AUTO: 3 /HPF (ref 0–4)
SP GR UR STRIP.AUTO: 1.03 (ref 1–1.03)
T VAGINALIS DNA VAG QL NAA+PROBE: NORMAL
UA DIPSTICK W REFLEX MICRO PNL UR: YES
URN SPEC COLLECT METH UR: ABNORMAL
UROBILINOGEN UR STRIP-ACNC: 1 E.U./DL
WBC #/AREA URNS AUTO: 1 /HPF (ref 0–5)

## 2024-09-25 LAB — BACTERIA UR CULT: NORMAL

## 2024-09-26 ENCOUNTER — TELEPHONE (OUTPATIENT)
Dept: OBGYN | Age: 44
End: 2024-09-26

## 2024-09-26 RX ORDER — TRIAMCINOLONE ACETONIDE 1 MG/G
OINTMENT TOPICAL 2 TIMES DAILY
Qty: 30 G | Refills: 0 | Status: SHIPPED | OUTPATIENT
Start: 2024-09-26 | End: 2024-10-03

## 2024-09-30 NOTE — PROGRESS NOTES
5.4  4.0 - 12.0 % Final    Eosinophils % 04/17/2024 0.9  0.0 - 5.0 % Final    Basophils % 04/17/2024 0.4  0.0 - 2.0 % Final    Neutrophils Absolute 04/17/2024 4.9  1.8 - 7.5 K/uL Final    Immature Grans (Abs) 04/17/2024 0.0  0.0 - 0.1 K/uL Final    Lymphocytes Absolute 04/17/2024 2.0  0.9 - 4.1 K/uL Final    Monocytes Absolute 04/17/2024 0.4  0.2 - 1.0 K/uL Final    Eosinophils Absolute 04/17/2024 0.1  0.0 - 0.5 K/uL Final    Basophils Absolute 04/17/2024 0.0  0.0 - 0.3 K/uL Final    Retic Ct Abs 04/17/2024 0.0  0 /100 WBC Final    Immature Granulocytes % 04/17/2024 0.3  <1 % Final    Albumin 04/17/2024 4.1  3.5 - 5.2 G/DL Final    Sodium 04/17/2024 140  135 - 148 MEQ/L Final    Potassium 04/17/2024 4.1  3.4 - 5.3 MEQ/L Final    Chloride 04/17/2024 105  96 - 110 MEQ/L Final    CO2 04/17/2024 25  19 - 32 MEQ/L Final    Glucose 04/17/2024 88  70 - 99 MG/DL Final    BUN 04/17/2024 14  3 - 29 MG/DL Final    Creatinine 04/17/2024 0.8  0.5 - 1.2 MG/DL Final    BUN/Creatinine Ratio 04/17/2024 18  7 - 25 Final    Estimated Glomerular Filtration Ra* 04/17/2024 94  >60 MLS/MIN/1.73M2 Final    Calcium 04/17/2024 9.7  8.5 - 10.5 MG/DL Final    Fasting Status 04/17/2024 FASTING   Final    Magnesium 04/17/2024 1.8  1.4 - 2.5 MG/DL Final    Phosphorus 04/17/2024 3.3  2.5 - 4.5 MG/DL Final    Color, UA 04/17/2024 YELLOW   Final    Clarity, UA 04/17/2024 CLEAR  CLEAR Final    Specific Gravity, UA 04/17/2024 1.031 (H)  1.005 - 1.030 Final    pH, Urine 04/17/2024 6.5  4.5 - 8.0 Final    Protein, Urine 04/17/2024 10 (A)  NEGATIVE MG/DL Final    Glucose, Ur 04/17/2024 Negative  NEGATIVE MG/DL Final    Ketones, Urine 04/17/2024 Negative  NEGATIVE MG/DL Final    Bilirubin Urine 04/17/2024 3.0 (A)  NEGATIVE MG/DL Final    Urine Hgb 04/17/2024 Negative  NEGATIVE MG/DL Final    Nitrite, Urine 04/17/2024 Negative  NEGATIVE Final    Urobilinogen, Urine 04/17/2024 <2  0 - 2 MG/DL Final    Leukocyte Esterase, Urine 04/17/2024 Negative  NEGATIVE

## 2024-10-01 ENCOUNTER — OFFICE VISIT (OUTPATIENT)
Dept: FAMILY MEDICINE CLINIC | Age: 44
End: 2024-10-01
Payer: COMMERCIAL

## 2024-10-01 VITALS
HEART RATE: 79 BPM | DIASTOLIC BLOOD PRESSURE: 94 MMHG | HEIGHT: 68 IN | WEIGHT: 252 LBS | BODY MASS INDEX: 38.19 KG/M2 | SYSTOLIC BLOOD PRESSURE: 142 MMHG | OXYGEN SATURATION: 93 %

## 2024-10-01 DIAGNOSIS — H66.006 RECURRENT ACUTE SUPPURATIVE OTITIS MEDIA WITHOUT SPONTANEOUS RUPTURE OF TYMPANIC MEMBRANE OF BOTH SIDES: Primary | ICD-10-CM

## 2024-10-01 DIAGNOSIS — I10 ESSENTIAL HYPERTENSION: ICD-10-CM

## 2024-10-01 DIAGNOSIS — K21.9 GASTROESOPHAGEAL REFLUX DISEASE, UNSPECIFIED WHETHER ESOPHAGITIS PRESENT: ICD-10-CM

## 2024-10-01 PROCEDURE — 3080F DIAST BP >= 90 MM HG: CPT | Performed by: STUDENT IN AN ORGANIZED HEALTH CARE EDUCATION/TRAINING PROGRAM

## 2024-10-01 PROCEDURE — G8417 CALC BMI ABV UP PARAM F/U: HCPCS | Performed by: STUDENT IN AN ORGANIZED HEALTH CARE EDUCATION/TRAINING PROGRAM

## 2024-10-01 PROCEDURE — 1036F TOBACCO NON-USER: CPT | Performed by: STUDENT IN AN ORGANIZED HEALTH CARE EDUCATION/TRAINING PROGRAM

## 2024-10-01 PROCEDURE — 99213 OFFICE O/P EST LOW 20 MIN: CPT | Performed by: STUDENT IN AN ORGANIZED HEALTH CARE EDUCATION/TRAINING PROGRAM

## 2024-10-01 PROCEDURE — G8427 DOCREV CUR MEDS BY ELIG CLIN: HCPCS | Performed by: STUDENT IN AN ORGANIZED HEALTH CARE EDUCATION/TRAINING PROGRAM

## 2024-10-01 PROCEDURE — 3077F SYST BP >= 140 MM HG: CPT | Performed by: STUDENT IN AN ORGANIZED HEALTH CARE EDUCATION/TRAINING PROGRAM

## 2024-10-01 PROCEDURE — G8484 FLU IMMUNIZE NO ADMIN: HCPCS | Performed by: STUDENT IN AN ORGANIZED HEALTH CARE EDUCATION/TRAINING PROGRAM

## 2024-10-01 NOTE — PROGRESS NOTES
10/17/2024    Dakota LANDEROS Vic    Chief Complaint   Patient presents with    6 Month Follow-Up     - lower abdominal cramping - 1 month.   - dizziness - pt states occurs after standing up for a period of time. Pt denied dizziness occurring while going to stand up from a sitting position. 1 month  - pt declined flu vaccine       HPI  History was obtained from patient.  Dakota is a 44 y.o. female with a PMHx as listed below who presents today for follow up on chronic conditions. No acute complaints.     Having cramping, started on vesicare by GYN    BM's, IBS. Last BM today, fairly regular, known history of IBS.   Angelicaly on linzess    Continues to have dizziness. Occur at any time.     Did not take bp medications today  1. Recurrent acute suppurative otitis media without spontaneous rupture of tympanic membrane of both sides    2. Essential hypertension    3. Gastroesophageal reflux disease, unspecified whether esophagitis present             REVIEW OF SYMPTOMS    Review of Systems   Constitutional:  Negative for chills and fatigue.   HENT:  Negative for congestion and sore throat.    Respiratory:  Negative for shortness of breath and wheezing.    Cardiovascular:  Negative for chest pain and palpitations.   Gastrointestinal:  Negative for abdominal pain and nausea.   Genitourinary:  Negative for frequency and urgency.   Neurological:  Negative for light-headedness.       PAST MEDICAL HISTORY  Past Medical History:   Diagnosis Date    Acute cholecystitis 08/14/2018    Acute post-operative pain 08/16/2018    Burning with urination     Excessive daytime sleepiness 12/13/2017    Fibromyalgia     GERD (gastroesophageal reflux disease)     Hernia of abdominal wall     Herpes simplex virus (HSV) infection     Hypertension     Hypertension     Morbid obesity 12/13/2017    Obesity     Urinary frequency     UTI (urinary tract infection)     Vaginal discharge     Vaginal irritation     Vaginal odor        FAMILY

## 2024-10-02 ASSESSMENT — RHEUMATOLOGY NEW PATIENT QUESTIONNAIRE
LOSS OF CONSCIOUSNESS: N
NIGHT SWEATS: N
DOUBLE OR BLURRED VISION: N
EYE REDNESS: N
BEHAVIORAL CHANGES: N
PAIN OR BURNING ON URINATION: N
CHEST PAIN: N
PERSISTENT DIARRHEA: N
SKIN REDNESS: Y
DIFFICULTY STAYING ASLEEP: N
SWOLLEN LEGS OR FEET: Y
SKIN TIGHTNESS: Y
HEARTBURN OR REFLUX: N
MORNING STIFFNESS IN LOWER BACK: Y
BLACK STOOLS: N
JOINT PAIN: Y
FAINTING: N
EYE DRYNESS: Y
STOMACH PAIN: N
MEMORY LOSS: N
UNUSUALLY RAPID OR SLOWED HEART RATE: N
ANEMIA: N
DRYNESS OF MOUTH: N
INCREASED SUSCEPTIBILITY TO INFECTION: N
DIFFICULTY BREATHING LYING DOWN: N
SUN SENSITIVE (SUN ALLERGY): N
UNUSUAL BLEEDING: N
VOMITING OF BLOOD OR COFFEE GROUND CONSISTENCY MATERIAL: N
SORES IN MOUTH OR NOSE: N
RASH: N
VAGINAL DRYNESS: N
ANXIETY: Y
MORNING STIFFNESS: Y
DIFFICULTY FALLING ASLEEP: N
HOARSE VOICE: N
EASY BRUISING: Y
NODULES/BUMPS: N
BLOOD IN STOOLS: N
SWOLLEN OR TENDER GLANDS: N
SEIZURES: N
COUGH: N
NUMBNESS OR TINGLING IN HANDS OR FEET: N
DEPRESSION: N
EASILY LOSING TEMPER: Y
FEVER: N
SHORTNESS OF BREATH: N
JOINT SWELLING: Y
UNUSUAL FATIGUE: N
UNEXPLAINED HEARING LOSS: N
UNEXPLAINED WEIGHT CHANGE: N
RASH OR ULCERS: N
LOSS OF VISION: N
COLOR CHANGES OF HANDS OR FEET IN THE COLD: Y
JAUNDICE: N
EXCESSIVE HAIR LOSS (MORE THAN YOUR NORM): N
HOW WOULD YOU DESCRIBE YOUR STIFFNESS ON AVERAGE: MODERATE
EYE PAIN: N
MUSCLE WEAKNESS: Y
AGITATION: Y
NAUSEA: N
DIFFICULTY SWALLOWING: N
HEADACHES: Y
ABNORMAL URINE: N

## 2024-10-04 ENCOUNTER — OFFICE VISIT (OUTPATIENT)
Dept: RHEUMATOLOGY | Age: 44
End: 2024-10-04
Payer: COMMERCIAL

## 2024-10-04 VITALS
HEART RATE: 73 BPM | WEIGHT: 250 LBS | OXYGEN SATURATION: 97 % | SYSTOLIC BLOOD PRESSURE: 138 MMHG | BODY MASS INDEX: 38.01 KG/M2 | DIASTOLIC BLOOD PRESSURE: 86 MMHG

## 2024-10-04 DIAGNOSIS — Z01.89 ENCOUNTER FOR OTHER SPECIFIED SPECIAL EXAMINATIONS: ICD-10-CM

## 2024-10-04 DIAGNOSIS — M25.50 POLYARTHRALGIA: Primary | ICD-10-CM

## 2024-10-04 DIAGNOSIS — M79.7 FIBROMYALGIA: ICD-10-CM

## 2024-10-04 PROCEDURE — G8427 DOCREV CUR MEDS BY ELIG CLIN: HCPCS | Performed by: STUDENT IN AN ORGANIZED HEALTH CARE EDUCATION/TRAINING PROGRAM

## 2024-10-04 PROCEDURE — G8484 FLU IMMUNIZE NO ADMIN: HCPCS | Performed by: STUDENT IN AN ORGANIZED HEALTH CARE EDUCATION/TRAINING PROGRAM

## 2024-10-04 PROCEDURE — G8417 CALC BMI ABV UP PARAM F/U: HCPCS | Performed by: STUDENT IN AN ORGANIZED HEALTH CARE EDUCATION/TRAINING PROGRAM

## 2024-10-04 PROCEDURE — 3079F DIAST BP 80-89 MM HG: CPT | Performed by: STUDENT IN AN ORGANIZED HEALTH CARE EDUCATION/TRAINING PROGRAM

## 2024-10-04 PROCEDURE — 3075F SYST BP GE 130 - 139MM HG: CPT | Performed by: STUDENT IN AN ORGANIZED HEALTH CARE EDUCATION/TRAINING PROGRAM

## 2024-10-04 PROCEDURE — 1036F TOBACCO NON-USER: CPT | Performed by: STUDENT IN AN ORGANIZED HEALTH CARE EDUCATION/TRAINING PROGRAM

## 2024-10-04 PROCEDURE — 99205 OFFICE O/P NEW HI 60 MIN: CPT | Performed by: STUDENT IN AN ORGANIZED HEALTH CARE EDUCATION/TRAINING PROGRAM

## 2024-10-04 NOTE — PATIENT INSTRUCTIONS
Complete ordered labs and get imaging done  We will discuss results at next visit  In the meantime continue lodine  RTC in 3 weeks

## 2024-10-14 ENCOUNTER — TELEPHONE (OUTPATIENT)
Dept: FAMILY MEDICINE CLINIC | Age: 44
End: 2024-10-14

## 2024-10-14 DIAGNOSIS — B37.2 CANDIDOSIS OF SKIN: Primary | ICD-10-CM

## 2024-10-14 RX ORDER — FLUCONAZOLE 150 MG/1
150 TABLET ORAL
Qty: 2 TABLET | Refills: 0 | Status: SHIPPED | OUTPATIENT
Start: 2024-10-14 | End: 2024-10-20

## 2024-10-14 NOTE — TELEPHONE ENCOUNTER
Patient has the following symptoms: Allergic reaction  The symptoms have been present since Sunday 4/14  Patient was not offered an appointment.  Pharmacy has been verified.     Patient would like another refill on the allegra. Patient says that she took some over the counter but does not work as well.   Patient stated with taking the prescribed antibiotics she now has a yeast infection and [facility]

## 2024-10-17 ASSESSMENT — ENCOUNTER SYMPTOMS
ABDOMINAL PAIN: 0
SORE THROAT: 0
SHORTNESS OF BREATH: 0
NAUSEA: 0
WHEEZING: 0

## 2024-10-21 ENCOUNTER — OFFICE VISIT (OUTPATIENT)
Dept: OBGYN | Age: 44
End: 2024-10-21
Payer: COMMERCIAL

## 2024-10-21 VITALS
SYSTOLIC BLOOD PRESSURE: 149 MMHG | BODY MASS INDEX: 37.44 KG/M2 | WEIGHT: 247 LBS | HEIGHT: 68 IN | DIASTOLIC BLOOD PRESSURE: 101 MMHG | HEART RATE: 80 BPM

## 2024-10-21 DIAGNOSIS — N83.202 LEFT OVARIAN CYST: ICD-10-CM

## 2024-10-21 DIAGNOSIS — B37.31 VAGINAL YEAST INFECTION: Primary | ICD-10-CM

## 2024-10-21 DIAGNOSIS — R10.2 PELVIC CRAMPING: ICD-10-CM

## 2024-10-21 PROCEDURE — 99214 OFFICE O/P EST MOD 30 MIN: CPT | Performed by: OBSTETRICS & GYNECOLOGY

## 2024-10-21 PROCEDURE — G8484 FLU IMMUNIZE NO ADMIN: HCPCS | Performed by: OBSTETRICS & GYNECOLOGY

## 2024-10-21 PROCEDURE — 3077F SYST BP >= 140 MM HG: CPT | Performed by: OBSTETRICS & GYNECOLOGY

## 2024-10-21 PROCEDURE — G8417 CALC BMI ABV UP PARAM F/U: HCPCS | Performed by: OBSTETRICS & GYNECOLOGY

## 2024-10-21 PROCEDURE — G8427 DOCREV CUR MEDS BY ELIG CLIN: HCPCS | Performed by: OBSTETRICS & GYNECOLOGY

## 2024-10-21 PROCEDURE — 1036F TOBACCO NON-USER: CPT | Performed by: OBSTETRICS & GYNECOLOGY

## 2024-10-21 PROCEDURE — 3080F DIAST BP >= 90 MM HG: CPT | Performed by: OBSTETRICS & GYNECOLOGY

## 2024-10-21 RX ORDER — TERCONAZOLE 0.4 %
CREAM WITH APPLICATOR VAGINAL
Qty: 45 G | Refills: 0 | Status: SHIPPED | OUTPATIENT
Start: 2024-10-21

## 2024-10-21 NOTE — PROGRESS NOTES
09/23/2024 [id number]/25/2024 0701  Culture, Urine [[id number]]    Urine, clean catch   Component Value   Urine Culture, Routine No growth at 18 to 36 hours   09/23/2024 [id number]/24/2024 0519  Urinalysis with Microscopic [[id number]]   (Abnormal)   Urine, clean catch   Component Value Units   Color, UA Yellow    Clarity, UA Clear    Glucose, Ur Negative mg/dL   Bilirubin, Urine Negative    Ketones, Urine TRACE Abnormal  mg/dL   Specific Gravity, UA 1.029    Blood, Urine Negative    pH, Urine 6.0    Protein, UA TRACE Abnormal  mg/dL   Urobilinogen, Urine 1.0 E.U./dL   Nitrite, Urine Negative    Leukocyte Esterase, Urine Negative    Microscopic Examination YES    Urine Type NotGiven    Bacteria, UA None Seen /HPF   Hyaline Casts, UA 0 /LPF   WBC, UA 1 /HPF   RBC, UA 3 /HPF   Epithelial Cells, UA 6 High   /HPF   09/23/2024 [id number]/24/2024 1549  Vaginal Pathogens Probes *A [[id number]]    Vaginal   Component Value   Trichomonas Vaginalis DNA Negative DNA not detected.  Normal range: Negative DNA not detected.   Gardnerella Vaginalis, DNA Probe Negative DNA not detected.  Normal range: Negative DNA not detected.   Candida Species, DNA Probe Negative DNA not detected.  Normal range: Negative DNA not detected.       06/18/2024 [id number]/20/2024 0821  Culture, Urine [[id number]]    Urine, clean catch   Component Value   Urine Culture, Routine No growth at 18 to 36 hours   06/18/2024 [id number]/19/2024 0122  Urinalysis with Microscopic [[id number]]   (Abnormal)   Urine, clean catch   Component Value Units   Color, UA Yellow    Clarity, UA Clear    Glucose, Ur Negative mg/dL   Bilirubin, Urine Negative    Ketones, Urine Negative mg/dL   Specific Gravity, UA 1.024    Blood, Urine Negative    pH, Urine 6.0    Protein, UA TRACE Abnormal  mg/dL   Urobilinogen, Urine 0.2 E.U./dL   Nitrite, Urine Negative    Leukocyte Esterase, Urine Negative    Microscopic Examination YES    Urine Type Cleancatch    Bacteria, UA None Seen /HPF   Hyaline Casts,

## 2024-10-22 ENCOUNTER — TELEPHONE (OUTPATIENT)
Dept: RHEUMATOLOGY | Age: 44
End: 2024-10-22

## 2024-10-22 DIAGNOSIS — M25.50 POLYARTHRALGIA: ICD-10-CM

## 2024-10-22 NOTE — TELEPHONE ENCOUNTER
Rima called from [facility] stating the test that was ordered for pt they do not have (ROCIO with Hep-2 igG by IFA) they have similar test to that one but the exact one , Rima states she believes the ROCIO with reflex would be the closes to this test. Please advise.    Call back number [phone]

## 2024-10-22 NOTE — TELEPHONE ENCOUNTER
Spoke with provider regarding lab issues. Provider states she wants the test she ordered but if this lab does not complete the order then patient will need to retest. Provider approved ROCIO by IFA w/reflex, if this is not the test does not provide what is needed patient will need to retest. Advised to Nanci CHAN by IFA w/ reflex was fine at this time.

## 2024-10-23 DIAGNOSIS — M25.50 POLYARTHRALGIA: ICD-10-CM

## 2024-10-23 DIAGNOSIS — E55.9 VITAMIN D DEFICIENCY: Primary | ICD-10-CM

## 2024-10-23 LAB
A/G RATIO: 1.5 RATIO (ref 0.8–2.6)
ALBUMIN: 4.2 G/DL (ref 3.5–5.2)
ALBUMIN: 4.3 G/DL (ref 3.5–5.2)
ALP BLD-CCNC: 70 U/L (ref 23–144)
ALT SERPL-CCNC: 14 U/L (ref 0–60)
ANA BY IFA: <1
AST SERPL-CCNC: 19 U/L (ref 0–55)
BILIRUB SERPL-MCNC: 0.3 MG/DL (ref 0–1.2)
BILIRUBIN DIRECT: <0.2 MG/DL (ref 0–0.4)
BILIRUBIN, INDIRECT: NORMAL MG/DL (ref 0–1.2)
BUN / CREAT RATIO: 14 (ref 7–25)
BUN BLDV-MCNC: 11 MG/DL (ref 3–29)
C REACTIVE PROTEIN (CRP), BODY FLUID: 0.71 MG/DL
CALCIUM SERPL-MCNC: 9.4 MG/DL (ref 8.5–10.5)
CHLORIDE BLD-SCNC: 108 MEQ/L (ref 96–110)
CO2: 24 MEQ/L (ref 19–32)
CREAT SERPL-MCNC: 0.8 MG/DL (ref 0.5–1.2)
CYCLIC CITRULLINATED PEPTIDE ANTIBODY IGG: <20 EIA
ESTIMATED GLOMERULAR FILTRATION RATE CREATININE EQUATION: 93 MLS/MIN/1.73M2
FASTING STATUS: NORMAL
GLOBULIN: 2.8 G/DL (ref 1.9–3.6)
GLUCOSE BLD-MCNC: 82 MG/DL (ref 70–99)
HAV IGM SER IA-ACNC: NEGATIVE
HCT VFR BLD CALC: 39.1 % (ref 34–49)
HEMOGLOBIN: 13 G/DL (ref 11.2–15.7)
HEP B S AGB SURF AG: NEGATIVE
HEPATITIS B CORE IGM ANTIBODY: NEGATIVE
HEPATITIS C VIRUS RNA SER/PLAS NCNC: NEGATIVE
MCH RBC QN AUTO: 28.1 PG (ref 26–34)
MCHC RBC AUTO-ENTMCNC: 33.2 G/DL (ref 30.7–35.5)
MCV RBC AUTO: 84.6 FL (ref 80–100)
PDW BLD-RTO: 13.6 %
PHOSPHORUS: 3.1 MG/DL (ref 2.5–4.5)
PLATELET # BLD: 301 K/UL (ref 140–400)
PMV BLD AUTO: 11 FL (ref 7.2–11.7)
POTASSIUM SERPL-SCNC: 4.1 MEQ/L (ref 3.4–5.3)
RBC # BLD: 4.62 M/UL (ref 3.95–5.26)
RHEUMATOID FACTOR: <10 IU/ML
SED RATE, AUTOMATED: 16 MM/HR (ref 0–20)
SODIUM BLD-SCNC: 143 MEQ/L (ref 135–148)
TOTAL PROTEIN: 7 G/DL (ref 6–8.3)
TSH ULTRASENSITIVE: 1.83 MCIU/ML (ref 0.4–4.5)
URIC ACID: 5 MG/DL (ref 2.5–7)
VITAMIN D 25-HYDROXY: 11 NG/ML (ref 30–100)
WBC # BLD: 6 K/UL (ref 3.5–10.9)

## 2024-10-23 RX ORDER — ERGOCALCIFEROL 1.25 MG/1
50000 CAPSULE, LIQUID FILLED ORAL WEEKLY
Qty: 12 CAPSULE | Refills: 1 | Status: SHIPPED | OUTPATIENT
Start: 2024-10-23

## 2024-10-24 LAB
INTERPRETATION: NEGATIVE
QUANTIFERON (R) TB GOLD (INCUBATED): <0.35 IU/ML
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
## 2024-11-19 ENCOUNTER — OFFICE VISIT (OUTPATIENT)
Dept: RHEUMATOLOGY | Age: 44
End: 2024-11-19
Payer: COMMERCIAL

## 2024-11-19 VITALS
BODY MASS INDEX: 38.32 KG/M2 | WEIGHT: 252 LBS | DIASTOLIC BLOOD PRESSURE: 82 MMHG | OXYGEN SATURATION: 98 % | SYSTOLIC BLOOD PRESSURE: 138 MMHG | HEART RATE: 58 BPM

## 2024-11-19 DIAGNOSIS — M25.50 POLYARTHRALGIA: Primary | ICD-10-CM

## 2024-11-19 DIAGNOSIS — M79.7 FIBROMYALGIA: ICD-10-CM

## 2024-11-19 DIAGNOSIS — G25.81 RESTLESS LEGS SYNDROME: ICD-10-CM

## 2024-11-19 DIAGNOSIS — E55.9 VITAMIN D DEFICIENCY: ICD-10-CM

## 2024-11-19 PROCEDURE — 3075F SYST BP GE 130 - 139MM HG: CPT | Performed by: STUDENT IN AN ORGANIZED HEALTH CARE EDUCATION/TRAINING PROGRAM

## 2024-11-19 PROCEDURE — 99215 OFFICE O/P EST HI 40 MIN: CPT | Performed by: STUDENT IN AN ORGANIZED HEALTH CARE EDUCATION/TRAINING PROGRAM

## 2024-11-19 PROCEDURE — 3079F DIAST BP 80-89 MM HG: CPT | Performed by: STUDENT IN AN ORGANIZED HEALTH CARE EDUCATION/TRAINING PROGRAM

## 2024-11-19 PROCEDURE — 1036F TOBACCO NON-USER: CPT | Performed by: STUDENT IN AN ORGANIZED HEALTH CARE EDUCATION/TRAINING PROGRAM

## 2024-11-19 PROCEDURE — G8484 FLU IMMUNIZE NO ADMIN: HCPCS | Performed by: STUDENT IN AN ORGANIZED HEALTH CARE EDUCATION/TRAINING PROGRAM

## 2024-11-19 PROCEDURE — G8427 DOCREV CUR MEDS BY ELIG CLIN: HCPCS | Performed by: STUDENT IN AN ORGANIZED HEALTH CARE EDUCATION/TRAINING PROGRAM

## 2024-11-19 PROCEDURE — G8417 CALC BMI ABV UP PARAM F/U: HCPCS | Performed by: STUDENT IN AN ORGANIZED HEALTH CARE EDUCATION/TRAINING PROGRAM

## 2024-11-19 RX ORDER — GABAPENTIN 100 MG/1
200 CAPSULE ORAL NIGHTLY
Qty: 180 CAPSULE | Refills: 0 | Status: SHIPPED | OUTPATIENT
Start: 2024-11-19 | End: 2025-02-17

## 2024-11-19 NOTE — PATIENT INSTRUCTIONS
Patient Instructions  Start gabapentin 2 tabs at bedtime.   Scheduled for water therapy.  Physical therapy referral has been placed  In the meantime continue lodine  Continue vit D   RTC in 2 months  Get ferritin labs for restless legs syndrome evaluation   See attached helpful fibromyalgia resources [email or web address]

## 2024-11-21 RX ORDER — METRONIDAZOLE 500 MG/1
500 TABLET ORAL 2 TIMES DAILY
Qty: 14 TABLET | Refills: 0 | Status: SHIPPED | OUTPATIENT
Start: 2024-11-21 | End: 2024-11-28

## 2024-12-09 ENCOUNTER — HOSPITAL ENCOUNTER (OUTPATIENT)
Dept: PHYSICAL THERAPY | Age: 44
Setting detail: THERAPIES SERIES
Discharge: HOME | End: 2024-12-09
Payer: COMMERCIAL

## 2024-12-09 PROCEDURE — 97161 PT EVAL LOW COMPLEX 20 MIN: CPT

## 2024-12-09 PROCEDURE — 97110 THERAPEUTIC EXERCISES: CPT

## 2024-12-09 ASSESSMENT — PAIN SCALES - GENERAL: PAINLEVEL_OUTOF10: 4

## 2024-12-09 ASSESSMENT — PAIN DESCRIPTION - PAIN TYPE: TYPE: CHRONIC PAIN

## 2024-12-09 ASSESSMENT — PAIN DESCRIPTION - DESCRIPTORS: DESCRIPTORS: ACHING

## 2024-12-09 ASSESSMENT — PAIN DESCRIPTION - ORIENTATION: ORIENTATION: RIGHT;LEFT

## 2024-12-09 ASSESSMENT — PAIN DESCRIPTION - LOCATION: LOCATION: LEG

## 2024-12-09 NOTE — FLOWSHEET NOTE
PLOF, and reduce risk for further decline in function.        Subjective:  See eval         Any changes in Ambulatory Summary Sheet?  None        Objective:  See eval           Exercises: (No more than 4 columns)   Exercise/Equipment 12/9/24 #1 Date Date           WARM UP      Nustep                TABLE      *TA X10 c 3s hold     *LTR X10 ea     *KTC stretch  2x20s     *SL hip ABD  X10 ea LE              STANDING                                                     PROPRIOCEPTION                                    MODALITIES                      Other Therapeutic Activities/Education:    Educated on anatomy of condition, prognosis, HEP     Home Exercise Program:    Access Code: [email or web address]  URL: [email or web address]  Date: 12/09/2024  Prepared by: Jessy Avelar    Exercises  - Supine Transversus Abdominis Bracing - Hands on Stomach  - 1 x daily - 7 x weekly - 2 sets - 10 reps - 3s hold  - Supine Lower Trunk Rotation  - 1 x daily - 7 x weekly - 2 sets - 10 reps - 3s hold  - Supine Single Knee to Chest Stretch  - 1 x daily - 7 x weekly - 2 sets - 20s hold  - Sidelying Hip Abduction  - 1 x daily - 7 x weekly - 2 sets - 10 reps    Manual Treatments:        Modalities:        Communication with other providers:  POC sent day of eval       Assessment:  (Response towards treatment session) (Pain Rating)   Patient is a 44 year old female who presents to physical therapy with reports of chronic fatigue and bilateral lower extremity pain, secondary to fibromyalgia; with a referral for aquatic therapy. Patient reports difficulties with sit to stands, standing/sitting for long periods, and great amount of fatigue during/after completing her work duties. Upon assessment, she demonstrates L>R LE strength deficits. 5STS reveals difficulties with transfers. She also demonstrates excessive anterior pelvic tilt in resting positions. She would benefit from ongoing skilled physical therapy with aquatic focus to address deficits,

## 2024-12-09 NOTE — PLAN OF CARE
Outpatient Physical Therapy           [place]           [x] Phone: [phone]   Fax: [phone]  [place]           [] Phone: [phone]   Fax: [phone]     To: Karla Rodriguez Nneoma Kate-Joan, MD   From: Jessy Avelar, PT, DPT     Patient: Dakota Ho       : 1980  Diagnosis: Fibromyalgia [M79.7] Diagnosis: fibromyalgia  Treatment Diagnosis: decreased LE strength, decreased endurance  Date: 2024    Physical Therapy Certification/Re-Certification Form  Dear ,   The following patient has been evaluated for physical therapy services and for therapy to continue, insurance requires physician review of the treatment plan initially and every 90 days. Please review the attached evaluation and/or summary of the patient's plan of care, and verify that you agree therapy should continue by signing the attached document and sending it back to our office.    Assessment:     Patient is a 44 year old female who presents to physical therapy with reports of chronic fatigue and bilateral lower extremity pain, secondary to fibromyalgia; with a referral for aquatic therapy. Patient reports difficulties with sit to stands, standing/sitting for long periods, and great amount of fatigue during/after completing her work duties. Upon assessment, she demonstrates L>R LE strength deficits. 5STS reveals difficulties with transfers. She also demonstrates excessive anterior pelvic tilt in resting positions. She would benefit from ongoing skilled physical therapy with aquatic focus to address deficits, return to PLOF, and reduce risk for further decline in function.    Plan of Care/Treatment to date:  [x] Therapeutic Exercise  [x] Modalities:  [x] Therapeutic Activity     [] Ultrasound  [] Electrical Stimulation  [] Gait Training      [] Cervical Traction [] Lumbar Traction  [x] Neuromuscular Re-education    [x] Cold/hotpack [] Iontophoresis   [x] Instruction in HEP      [x]

## 2024-12-19 ENCOUNTER — HOSPITAL ENCOUNTER (OUTPATIENT)
Dept: PHYSICAL THERAPY | Age: 44
Setting detail: THERAPIES SERIES
Discharge: HOME | End: 2024-12-19
Payer: COMMERCIAL

## 2024-12-19 PROCEDURE — 97113 AQUATIC THERAPY/EXERCISES: CPT

## 2024-12-19 NOTE — FLOWSHEET NOTE
Physical Therapy Aquatic Flow Sheet   Date:  2024    Patient Name:  Dakota Ho    :  1980  Restrictions/Precautions:    Diagnosis:   Fibromyalgia [M79.7] fibromyalgia    Treatment Diagnosis:   decreased LE strength, decreased endurance    Insurance/Certification information:  [employer] / Plan: [employer] [place] / Product Type: *No Product type* ; visits to date/visits approved   Referring Physician:  Karla Rodriguez MD Onuorah, Nneoma Kate-Joan, MD    Any changes in Ambulatory Summary Sheet?:  Plan of care signed (Y/N):    Visit# / total visits:   Pain level: 0-3/10 (\"Off the charts\" in the evening)    Electronically signed by:  Terra Bass, PT, DPT    Subjective: pt states her pain intenisifies by the end of the day; 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  She gives v/u of recommendations made today including perform hamstring stretches as instructed today; drink plenty of water after pool session and look into foam roll for posterior thigh mm pain     Key  B= Belt DB= Dumbells T= Theratube   H= Hydrotone N= Noodles W= Weights   P= Paddles S= Speedo equipment K= Kickboard     Exercises/Activities       Date:  24 Visit: 2     Warm-up/Amb    Exercises      Slow forward   X3; vc for posture  HR/TR  2x10    Slow sideways  X3; vc for posture  Marches      Slow backwards    Mini-squats  2x10       3-way SLR  2x10; light touch of one hand        Hip circles/fig 8          Hamstring curls  2x10        Knee extension          Pelvic tilts  Discussed throughout session and for HEP when supine         Iso ADD seated   5\" x 20        Shoulder flex/ext  In 90-90 with open paddles x10    Functional    Shoulder abd/add  Seated on bench with open paddles 2x10    Step    Shoulder H. abd/add  Seated on bench with open paddles 2x10    Lifting          Hand to opp knee    Rowing  Seated on bench with open paddles 2x10    Push down squat    Bilateral pull

## 2024-12-26 ENCOUNTER — HOSPITAL ENCOUNTER (OUTPATIENT)
Dept: PHYSICAL THERAPY | Age: 44
Setting detail: THERAPIES SERIES
Discharge: HOME | End: 2024-12-26
Payer: COMMERCIAL

## 2024-12-26 PROCEDURE — 97113 AQUATIC THERAPY/EXERCISES: CPT

## 2024-12-26 NOTE — FLOWSHEET NOTE
Physical Therapy Aquatic Flow Sheet   Date:  2024    Patient Name:  Dakota Ho    :  1980  Restrictions/Precautions:    Diagnosis:   Fibromyalgia [M79.7] fibromyalgia    Treatment Diagnosis:   decreased LE strength, decreased endurance    Insurance/Certification information:  [employer] / Plan: [employer] [place]ICA[place] / Product Type: *No Product type* ; visits to date/visits approved   Referring Physician:  Karla Rodriguez MD Onuorah, Nneoma Kate-Joan, MD    Any changes in Ambulatory Summary Sheet?:  Plan of care signed (Y/N):      Visit# / total visits: 3 /12    Pain level: 0 /10     Electronically signed by:  Heavenly Currie, [place], [id number]    Subjective: pt states after just one session the last time her pain improved by 60%.  The pain that keeps me down, is decreased to maybe 2 nights a week     Key  B= Belt DB= Dumbells T= Theratube   H= Hydrotone N= Noodles W= Weights   P= Paddles S= Speedo equipment K= Kickboard     Exercises/Activities       Date:  24 Visit: 3     Warm-up/Amb    Exercises      Slow forward/retro    X3 lengths ea. Education for posture corrections and core engagement. Shoulder flex/ext, abd/add, horizontal abd/add and rows Seated on bench, feet on pool floor, back unsupported off wall, P open x 10 ea. Education for posture and core corrections.    Slow sideways  X3 lengths ea, progressed to partial squat with BILLY shoulder abd/add. Education for posture and core.  Corner dangle position   X 2-3 min hang for spinal distraction, x 2-3 mins cycling, x 10 reps DKC, rest break for UE and came back for rotations x 10 ea    Slow backwards    Mini-squats  From bench for tech, light touch of buttock only, x 20       3-way SLR  X 15 cycles ea, cues for light touch and posture and core.        Hip circles/fig 8          Hamstring curls          Knee extension  X 10 with Df seated LAQ        Pelvic tilts          Iso ADD seated               Functional

## 2024-12-31 ENCOUNTER — HOSPITAL ENCOUNTER (OUTPATIENT)
Dept: PHYSICAL THERAPY | Age: 44
Setting detail: THERAPIES SERIES
Discharge: HOME | End: 2024-12-31
Payer: COMMERCIAL

## 2024-12-31 PROCEDURE — 97113 AQUATIC THERAPY/EXERCISES: CPT

## 2024-12-31 NOTE — FLOWSHEET NOTE
Physical Therapy Aquatic Flow Sheet   Date:  2024    Patient Name:  Dakota Ho    :  1980  Restrictions/Precautions:    Diagnosis:   Fibromyalgia [M79.7] fibromyalgia    Treatment Diagnosis:   decreased LE strength, decreased endurance    Insurance/Certification information:  [employer] / Plan: [employer] [place] MED[place] / Product Type: *No Product type* ; visits to date/visits approved   Referring Physician:  Karla Rodriguez MD Onuorah, Nneoma Kate-Joan, MD    Any changes in Ambulatory Summary Sheet?:  Plan of care signed (Y/N):      Visit# / total visits:     Pain level: 2 /10     Electronically signed by:  Micki Peng PTA,     Subjective: Patient states that pain is in her L knee this morning; pain is better earlier in day; but increases with working.    Key  B= Belt DB= Dumbells T= Theratube   H= Hydrotone N= Noodles W= Weights   P= Paddles S= Speedo equipment K= Kickboard     Exercises/Activities       Date:  2024 Visit: 4     Warm-up/Amb    Exercises      Slow forward/retro    X3 lengths ea.; no resistance. Education for posture corrections and core engagement. Shoulder flex/ext, abd/add, horizontal abd/add and rows Squat at wall; no resistance; x12 ea. Education for posture and core corrections.    Slow sideways  X3 lengths ea, no resistance; cues to partial squat with BILLY shoulder abd/add. Education for posture and core.  Corner dangle position   X 2-3 min hang for spinal distraction, x 5 mins cycling, x 10 reps DKC, rest break as needed between then rotations x 10 ea      Slow backwards    Mini-squats  From bench for tech, light touch of buttock only, x 20         3-way SLR  X 15 cycles ea, cues for light touch and posture and core.        Hip circles/fig 8          Hamstring curls          Knee extension  X 10 with Df seated LAQ        Pelvic tilts          Iso ADD seated               Functional  Independent in/out of pool using steps and HRA for

## 2025-01-02 ENCOUNTER — OFFICE VISIT (OUTPATIENT)
Dept: OBGYN | Age: 45
End: 2025-01-02
Payer: COMMERCIAL

## 2025-01-02 VITALS
SYSTOLIC BLOOD PRESSURE: 161 MMHG | HEIGHT: 68 IN | DIASTOLIC BLOOD PRESSURE: 110 MMHG | BODY MASS INDEX: 38.19 KG/M2 | WEIGHT: 252 LBS

## 2025-01-02 DIAGNOSIS — R82.90 MALODOROUS URINE: ICD-10-CM

## 2025-01-02 DIAGNOSIS — N74 FEMALE PELVIC INFLAMMATORY DISORDERS IN DISEASES CLASSIFIED ELSEWHERE: ICD-10-CM

## 2025-01-02 DIAGNOSIS — N89.8 VAGINAL DISCHARGE: Primary | ICD-10-CM

## 2025-01-02 PROCEDURE — G8417 CALC BMI ABV UP PARAM F/U: HCPCS | Performed by: OBSTETRICS & GYNECOLOGY

## 2025-01-02 PROCEDURE — 3080F DIAST BP >= 90 MM HG: CPT | Performed by: OBSTETRICS & GYNECOLOGY

## 2025-01-02 PROCEDURE — 3077F SYST BP >= 140 MM HG: CPT | Performed by: OBSTETRICS & GYNECOLOGY

## 2025-01-02 PROCEDURE — 99214 OFFICE O/P EST MOD 30 MIN: CPT | Performed by: OBSTETRICS & GYNECOLOGY

## 2025-01-02 PROCEDURE — 1036F TOBACCO NON-USER: CPT | Performed by: OBSTETRICS & GYNECOLOGY

## 2025-01-02 PROCEDURE — G8427 DOCREV CUR MEDS BY ELIG CLIN: HCPCS | Performed by: OBSTETRICS & GYNECOLOGY

## 2025-01-02 RX ORDER — NITROFURANTOIN 25; 75 MG/1; MG/1
100 CAPSULE ORAL 2 TIMES DAILY
Qty: 14 CAPSULE | Refills: 0 | Status: SHIPPED | OUTPATIENT
Start: 2025-01-02 | End: 2025-01-09

## 2025-01-02 NOTE — PROGRESS NOTES
Relation Age of Onset    Hypertension Mother     Hypertension Father     Hearing Loss Brother     High Blood Pressure Maternal Grandmother     Arthritis Maternal Grandfather     Breast Cancer Neg Hx     Ovarian Cancer Neg Hx        Current Outpatient Medications   Medication Sig Dispense Refill    nitrofurantoin, macrocrystal-monohydrate, (MACROBID) 100 MG capsule Take 1 capsule by mouth 2 times daily for 7 days 14 capsule 0    gabapentin (NEURONTIN) 100 MG capsule Take 2 capsules by mouth at bedtime for 90 days. Intended supply: 90 days 180 capsule 0    cloNIDine (CATAPRES) 0.1 MG tablet Take 1 tablet by mouth 2 times daily Hold if sbp < 120 180 tablet 3    dilTIAZem (CARDIZEM CD) 180 MG extended release capsule Take 1 capsule by mouth daily 90 capsule 3    spironolactone (ALDACTONE) 50 MG tablet Take 1 tablet by mouth daily 90 tablet 3    vitamin D (ERGOCALCIFEROL) 1.25 MG (48178 UT) CAPS capsule Take 1 capsule by mouth once a week 12 capsule 1    solifenacin (VESICARE) 5 MG tablet Take 1 tablet by mouth daily 30 tablet 3    terconazole (TERAZOL 7) 0.4 % vaginal cream Place vaginally nightly. (Patient not taking: Reported on 2024) 45 g 0    fluticasone (FLONASE) 50 MCG/ACT nasal spray 1 spray by Each Nostril route in the morning and at bedtime (Patient not taking: Reported on 2024) 32 g 1    valACYclovir (VALTREX) 1 g tablet Take 1 tablet by mouth daily (Patient taking differently: Take 1 tablet by mouth as needed) 30 tablet 11    baclofen (LIORESAL) 10 MG tablet Take 1 tablet by mouth as needed       No current facility-administered medications for this visit.       No Known Allergies        Immunization History   Administered Date(s) Administered    COVID-19, PFIZER PURPLE top, DILUTE for use, (age 12 y+), 30mcg/0.3mL 2021, 2021    MMR, PRIORIX, M-M-R II, (age 12m+), SC, 0.5mL 2008    TDaP, ADACEL (age 10y-64y), BOOSTRIX (age 10y+), IM, 0.5mL 2008       Review of

## 2025-01-03 LAB
BACTERIA URNS QL MICRO: ABNORMAL /HPF
BILIRUB UR QL STRIP.AUTO: NEGATIVE
BV BACTERIA DNA VAG QL NAA+PROBE: NOT DETECTED
C GLABRATA DNA VAG QL NAA+PROBE: ABNORMAL
C GLABRATA DNA VAG QL NAA+PROBE: NOT DETECTED
C KRUSEI DNA VAG QL NAA+PROBE: NOT DETECTED
C TRACH DNA CVX QL NAA+PROBE: NEGATIVE
CANDIDA DNA VAG QL NAA+PROBE: DETECTED
CLARITY UR: CLEAR
COLOR UR: YELLOW
EPI CELLS #/AREA URNS AUTO: 12 /HPF (ref 0–5)
GLUCOSE UR STRIP.AUTO-MCNC: NEGATIVE MG/DL
HGB UR QL STRIP.AUTO: NEGATIVE
HYALINE CASTS #/AREA URNS AUTO: 0 /LPF (ref 0–8)
KETONES UR STRIP.AUTO-MCNC: NEGATIVE MG/DL
LEUKOCYTE ESTERASE UR QL STRIP.AUTO: NEGATIVE
N GONORRHOEA DNA CERV MUCUS QL NAA+PROBE: NEGATIVE
NITRITE UR QL STRIP.AUTO: NEGATIVE
PH UR STRIP.AUTO: 7 [PH] (ref 5–8)
PROT UR STRIP.AUTO-MCNC: NEGATIVE MG/DL
RBC CLUMPS #/AREA URNS AUTO: 3 /HPF (ref 0–4)
SP GR UR STRIP.AUTO: 1.02 (ref 1–1.03)
T VAGINALIS DNA VAG QL NAA+PROBE: NOT DETECTED
UA DIPSTICK W REFLEX MICRO PNL UR: ABNORMAL
URN SPEC COLLECT METH UR: ABNORMAL
UROBILINOGEN UR STRIP-ACNC: 0.2 E.U./DL
WBC #/AREA URNS AUTO: 1 /HPF (ref 0–5)

## 2025-01-03 RX ORDER — FLUCONAZOLE 150 MG/1
150 TABLET ORAL
Qty: 2 TABLET | Refills: 0 | Status: SHIPPED | OUTPATIENT
Start: 2025-01-03 | End: 2025-01-06

## 2025-01-04 LAB — BACTERIA UR CULT: NORMAL

## 2025-01-14 ENCOUNTER — TELEPHONE (OUTPATIENT)
Dept: OBGYN | Age: 45
End: 2025-01-14

## 2025-01-14 ENCOUNTER — HOSPITAL ENCOUNTER (OUTPATIENT)
Dept: PHYSICAL THERAPY | Age: 45
Setting detail: THERAPIES SERIES
Discharge: HOME | End: 2025-01-14
Payer: COMMERCIAL

## 2025-01-14 LAB — FERRITIN: 105 NG/ML (ref 12–156)

## 2025-01-14 PROCEDURE — 97110 THERAPEUTIC EXERCISES: CPT

## 2025-01-14 NOTE — FLOWSHEET NOTE
Outpatient Physical Therapy  [place]           [x] Phone: [phone]   Fax: [phone]  [place]           [] Phone: [phone]   Fax: [phone]        Physical Therapy Daily Treatment Note  Date:  2025    Patient Name:  Dakota LANDEROS Vic    :  1980  MRN: [id number]  Restrictions/Precautions: No data recorded      Diagnosis:   No admission diagnoses are documented for this encounter.    Date of Injury/Surgery:   Treatment Diagnosis:     Insurance/Certification information:    Referring Physician:  Karla Rodriguez     PCP: Jacinto Marrero DO  Next Doctor Visit:    Plan of care signed (Y/N):  sent day of eval   Outcome Measure: FMI-Q  Visit# / total visits:    Pain level: /10 In LEs  Goals:     Patient goals: \"to not feel as fatigued\" PROGRESSING 25     Long Term Goals  Time Frame for Long Term Goals: 6 weeks  Pt will demonstrate independence with HEP MET 25   Patient will improve 5STS from 26s with no UE use to <20s with no UE use to demonstrate improved transfer ability. MET 25  16.7s  Patient will improve FIQ from 54% to < 40% to demonstrate improved functional activity tolerance. PROGRESSING 25 41%  Patient will report >50% overall improvement in condition. MET 25   Pt will improve gross LLE strength to >4+/5 PROGRESSING 25       Summary of Evaluation:   Patient is a 44 year old female who presents to physical therapy with reports of chronic fatigue and bilateral lower extremity pain, secondary to fibromyalgia; with a referral for aquatic therapy. Patient reports difficulties with sit to stands, standing/sitting for long periods, and great amount of fatigue during/after completing her work duties. Upon assessment, she demonstrates L>R LE strength deficits. 5STS reveals difficulties with transfers. She also demonstrates excessive anterior pelvic tilt in resting positions. She would benefit from ongoing skilled physical therapy

## 2025-01-14 NOTE — PROGRESS NOTES
Outpatient Physical Therapy           [place]           [x] Phone: [phone]   Fax: [phone]  [place]           [] Phone: [phone]   Fax: [phone]      To: Karla Rodriguez*     From: Jessy Avelar, PT, DPT     Patient: Dakota Pereiratiford                    : 1980  Diagnosis:  Fibromyalgia [M79.7]        Treatment Diagnosis:   decreased LE strength, decreased endurance     Date: 2025  [x]  Progress Note                []  Discharge Note    Evaluation Date:  24   Total Visits to date:  5  Cancels/No-shows to date:  0    Subjective:     \"I have been doing pretty good. It has helped a lot I think. Now I just hurt a couple times a week compared to hurting all the time. I do want to continue with the aquatic therapy for a couple more sessions.  It just hurts mostly when I get up but it is not too bad today. It is still just in my legs when it does hurt. Stairs are not easy, but not as bad as they usually are. \"      Pain:  5/10     Plan of Care/Treatment to date:  [x] Therapeutic Exercise    [x] Modalities:  [x] Therapeutic Activity     [] Ultrasound  [] Electrical Stimulation  [] Gait Training      [] Cervical Traction   [] Lumbar Traction  [x] Neuromuscular Re-education  [x] Cold/hotpack [] Iontophoresis  [x] Instruction in HEP      Other:  [x] Manual Therapy       [x]  Vasopneumatic  [x] Aquatic Therapy       []   Dry Needle Therapy                      Objective/Significant Findings At Last Visit/Comments:         5STS:16.67s no UE use   FIQ: 41.97     LE MMT  Hip FLX: R 5/5 L 4+/5   Hip ABD: R 4+/5 L 4+/5   Hip ADD: B 5/5  Knee FLX: B 5/5   Knee EXT: B 5/5       Overall improvement : 75%    Assessment:       Today is a progress note for Dakota. Since the start of therapy, Dakota has been making good progress towards her therapy goals. She reports overall LE pain has been improving since starting aquatic therapy. She reports she feels like she is able to get out  Parent called to schedule an appointment for child. Called mother with help from language services  #[phone].  A future virtual and in person appointment was scheduled.  Mother states that she is concerned about Murray's behavior and would like to be put on the cancellation lists for an earlier appointment.         Eva Liu, RN  [phone]

## 2025-01-15 RX ORDER — FLUCONAZOLE 150 MG/1
TABLET ORAL
Qty: 12 TABLET | Refills: 0 | Status: SHIPPED | OUTPATIENT
Start: 2025-01-15

## 2025-01-18 NOTE — PROGRESS NOTES
forward from the note written by me on 11/19/24.  I have reviewed and updated the history, physical exam, data, assessment and plan of the note so that it reflects the current evaluation and management of the patient. This note was dictated with voice recognition software.

## 2025-01-20 ENCOUNTER — OFFICE VISIT (OUTPATIENT)
Age: 45
End: 2025-01-20
Payer: COMMERCIAL

## 2025-01-20 VITALS
HEART RATE: 80 BPM | OXYGEN SATURATION: 97 % | BODY MASS INDEX: 38.4 KG/M2 | WEIGHT: 253.4 LBS | RESPIRATION RATE: 14 BRPM | HEIGHT: 68 IN | SYSTOLIC BLOOD PRESSURE: 148 MMHG | DIASTOLIC BLOOD PRESSURE: 94 MMHG

## 2025-01-20 DIAGNOSIS — G25.81 RESTLESS LEGS SYNDROME: ICD-10-CM

## 2025-01-20 DIAGNOSIS — E55.9 VITAMIN D DEFICIENCY: ICD-10-CM

## 2025-01-20 DIAGNOSIS — M79.7 FIBROMYALGIA: ICD-10-CM

## 2025-01-20 DIAGNOSIS — M25.50 POLYARTHRALGIA: Primary | ICD-10-CM

## 2025-01-20 PROCEDURE — G8417 CALC BMI ABV UP PARAM F/U: HCPCS | Performed by: STUDENT IN AN ORGANIZED HEALTH CARE EDUCATION/TRAINING PROGRAM

## 2025-01-20 PROCEDURE — 1036F TOBACCO NON-USER: CPT | Performed by: STUDENT IN AN ORGANIZED HEALTH CARE EDUCATION/TRAINING PROGRAM

## 2025-01-20 PROCEDURE — 99214 OFFICE O/P EST MOD 30 MIN: CPT | Performed by: STUDENT IN AN ORGANIZED HEALTH CARE EDUCATION/TRAINING PROGRAM

## 2025-01-20 PROCEDURE — G8427 DOCREV CUR MEDS BY ELIG CLIN: HCPCS | Performed by: STUDENT IN AN ORGANIZED HEALTH CARE EDUCATION/TRAINING PROGRAM

## 2025-01-20 PROCEDURE — 3077F SYST BP >= 140 MM HG: CPT | Performed by: STUDENT IN AN ORGANIZED HEALTH CARE EDUCATION/TRAINING PROGRAM

## 2025-01-20 PROCEDURE — 3080F DIAST BP >= 90 MM HG: CPT | Performed by: STUDENT IN AN ORGANIZED HEALTH CARE EDUCATION/TRAINING PROGRAM

## 2025-01-20 RX ORDER — GABAPENTIN 100 MG/1
200 CAPSULE ORAL NIGHTLY
Qty: 180 CAPSULE | Refills: 1 | Status: SHIPPED | OUTPATIENT
Start: 2025-01-20 | End: 2025-07-19

## 2025-01-20 RX ORDER — ERGOCALCIFEROL 1.25 MG/1
50000 CAPSULE, LIQUID FILLED ORAL WEEKLY
Qty: 12 CAPSULE | Refills: 1 | Status: SHIPPED | OUTPATIENT
Start: 2025-01-20

## 2025-01-20 NOTE — PATIENT INSTRUCTIONS
Patient Instructions  Continue gabapentin 2 tabs at bedtime.   Complete water therapy  Continue vit D   Please speak with PCP to recheck vit D levels and adjust your dosing  RTC as needed if knee injections are warranted

## 2025-01-23 ENCOUNTER — HOSPITAL ENCOUNTER (OUTPATIENT)
Dept: PHYSICAL THERAPY | Age: 45
Setting detail: THERAPIES SERIES
Discharge: HOME | End: 2025-01-23
Payer: COMMERCIAL

## 2025-01-23 PROCEDURE — 97113 AQUATIC THERAPY/EXERCISES: CPT

## 2025-01-23 NOTE — FLOWSHEET NOTE
Physical Therapy Aquatic Flow Sheet   Date:  2025    Patient Name:  Dakota Ho    :  1980  Restrictions/Precautions:    Diagnosis:   Fibromyalgia [M79.7] fibromyalgia    Treatment Diagnosis:   decreased LE strength, decreased endurance    Insurance/Certification information:  [employer] / Plan: [employer] [place] MED[place] / Product Type: *No Product type* ; visits to date/visits approved   Referring Physician:  Karla Rodriguez MD Onuorah, Nneoma Kate-Joan, MD    Any changes in Ambulatory Summary Sheet?:  Plan of care signed (Y/N):      Visit# / total visits:     Pain level: 2 /10     Electronically signed by:  Heavenly Currie PTA, [id number]    Subjective: Patient states that pain is in her L knee but the sharp pains are down to a couple times a week.   Key  B= Belt DB= Dumbells T= Theratube   H= Hydrotone N= Noodles W= Weights   P= Paddles S= Speedo equipment K= Kickboard     Exercises/Activities       Date:  25 Visit: 6     Warm-up/Amb    Exercises      Slow forward/retro    X6 mins wiht submerged foam DB for resistance. Education for posture corrections and core engagement. Shoulder flex/ext, abd/add, horizontal abd/add and rows Squat at wall, 2# x10 ea. Education for posture and core corrections.    sideways  X3 mins no resistance; cues to partial squat with BILLY shoulder abd/add. Education for posture and core.  Corner dangle position   X 2-3 min hang for spinal distraction, x 3-4 mins cycling, x 20 reps DKC, x 20 rotations         Mini-squats  From bench for tech, light touch of buttock only, x 20         3-way SLR  X 20 cycles ea, cues for light touch and posture and core.        Hip circles/fig 8          Standing trunk  rotations kayak  Small diameter pool N x 10 ea side alternating.        Knee extension  X        Prone plank for modified bird dogs  Hands on pool bench x 10 ea. Reports \" I like this one it works everything and is a good stretch\"

## 2025-01-30 ENCOUNTER — HOSPITAL ENCOUNTER (OUTPATIENT)
Dept: PHYSICAL THERAPY | Age: 45
Setting detail: THERAPIES SERIES
Discharge: HOME | End: 2025-01-30
Payer: COMMERCIAL

## 2025-01-30 PROCEDURE — 97113 AQUATIC THERAPY/EXERCISES: CPT

## 2025-01-31 NOTE — FLOWSHEET NOTE
Physical Therapy Aquatic Flow Sheet   Date:  2025    Patient Name:  Dakota Ho    :  1980  Restrictions/Precautions:    Diagnosis:   Fibromyalgia [M79.7] fibromyalgia    Treatment Diagnosis:   decreased LE strength, decreased endurance    Insurance/Certification information:  [employer] / Plan: [employer] [place] MEDICA[place] / Product Type: *No Product type* ; visits to date/visits approved   Referring Physician:  Karla Rodriguez MD Onuorah, Nneoma Kate-Joan, MD    Any changes in Ambulatory Summary Sheet?:  Plan of care signed (Y/N):      Visit# / total visits:     Pain level: 7 /10     Electronically signed by:  Micki Peng PTA,     Subjective: Patient states that pain is really high today d/t having to 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      Key  B= Belt DB= Dumbells T= Theratube   H= Hydrotone N= Noodles W= Weights   P= Paddles S= Speedo equipment K= Kickboard     Exercises/Activities       Date:  25 Visit: 7     Warm-up/Amb    Exercises      Slow forward/retro    X6 mins with submerged foam DB for resistance. Education for posture corrections and core engagement. Shoulder flex/ext, abd/add, horizontal abd/add and rows Squat at wall, 2# x10 ea. Education for posture and core corrections.    sideways  X3 mins 1# weights BUE; cues to partial squat with BILLY shoulder abd/add. Education for posture and core.  Corner dangle position   X 2-3 min hang for spinal distraction, x 5 mins cycling, x 20 reps DKC, x 20 rotations         Mini-squats  From bench for tech, light touch of buttock only, x 20         3-way SLR  X 20 cycles ea, cues for light touch and posture and core.        Hip circles/fig 8          Standing trunk  rotations kayak  Small diameter pool N x 15 ea side alternating.        Knee extension  X        Prone plank for modified bird dogs  Hands on pool bench x 15 ea. Reports \" I like this one it works everything and is a good stretch\"

## 2025-02-06 ENCOUNTER — HOSPITAL ENCOUNTER (OUTPATIENT)
Dept: PHYSICAL THERAPY | Age: 45
Setting detail: THERAPIES SERIES
Discharge: HOME | End: 2025-02-06

## 2025-02-06 NOTE — FLOWSHEET NOTE
Physical Therapy  Cancellation/No-show Note  Patient Name:  Dakota Ho  :  1980   Date:  2025  Cancelled visits to date: 0  No-shows to date: 1    For today's appointment patient:  []  Cancelled  []  Rescheduled appointment  [x]  No-show     Reason given by patient:  []  Patient ill  []  Conflicting appointment  []  No transportation    []  Conflict with work  []  No reason given  []  Other:     Comments:      Electronically signed by:  Heavenly Currie PTA,  [id number]/2025, 2:52 PM

## 2025-02-13 ENCOUNTER — HOSPITAL ENCOUNTER (OUTPATIENT)
Dept: PHYSICAL THERAPY | Age: 45
Setting detail: THERAPIES SERIES
Discharge: HOME | End: 2025-02-13
Payer: COMMERCIAL

## 2025-02-13 PROCEDURE — 97113 AQUATIC THERAPY/EXERCISES: CPT

## 2025-02-13 NOTE — FLOWSHEET NOTE
Physical Therapy Aquatic Flow Sheet   Date:  2025    Patient Name:  Dakota Ho    :  1980  Restrictions/Precautions:    Diagnosis:   Fibromyalgia [M79.7] fibromyalgia    Treatment Diagnosis:   decreased LE strength, decreased endurance    Insurance/Certification information:  [employer] / Plan: [employer] [place] MEDICA[place] / Product Type: *No Product type* ; visits to date/visits approved   Referring Physician:  Karla Rodriguez MD Onuorah, Nneoma Kate-Joan, MD    Any changes in Ambulatory Summary Sheet?:  Plan of care signed (Y/N):      Visit# / total visits:     Pain level: 0/10     Electronically signed by:  Terra Bass PT,     Subjective: Much pain last week - on feet a lot; no pain today.  Legs feeling better with only one night of pain a week now.  Will r/s re eval at [facility] which is currently scheduled for      Key  B= Belt DB= Dumbells T= Theratube   H= Hydrotone N= Noodles W= Weights   P= Paddles S= Speedo equipment K= Kickboard     Exercises/Activities       Date:  25 Visit: 8     Warm-up/Amb    Exercises      Slow forward/retro    X3 mins with submerged foam DB for resistance. Education for posture corrections and core engagement. Shoulder flex/ext, abd/add, horizontal abd/add and rows Squat at wall, closed paddles  x15 ea. Education for posture and core corrections.    sideways  X3 mins foam DB BUE; cues to partial squat with BILLY shoulder abd/add. Education for posture and core.  Corner dangle position   X 2-3 min hang for spinal distraction, x 5 mins cycling, x 20 reps DKC, x 20 rotations         Mini-squats  From bench for tech, light touch of buttock only, x 20         3-way SLR  X 20 cycles ea, cues for light touch and posture and core.        Hip circles/fig 8          Standing trunk  rotations [employer]  Small diameter pool N x 15 ea side alternating.        Knee extension  X        Prone plank for modified bird dogs  Hands on pool bench x 15 ea.

## 2025-04-15 ENCOUNTER — OFFICE VISIT (OUTPATIENT)
Dept: OBGYN | Age: 45
End: 2025-04-15
Payer: COMMERCIAL

## 2025-04-15 VITALS
DIASTOLIC BLOOD PRESSURE: 115 MMHG | WEIGHT: 253 LBS | SYSTOLIC BLOOD PRESSURE: 170 MMHG | BODY MASS INDEX: 38.34 KG/M2 | HEIGHT: 68 IN | HEART RATE: 85 BPM

## 2025-04-15 DIAGNOSIS — N74 FEMALE PELVIC INFLAMMATORY DISORDERS IN DISEASES CLASSIFIED ELSEWHERE: ICD-10-CM

## 2025-04-15 DIAGNOSIS — N89.8 VAGINAL ODOR: Primary | ICD-10-CM

## 2025-04-15 DIAGNOSIS — Z12.31 SCREENING MAMMOGRAM FOR BREAST CANCER: ICD-10-CM

## 2025-04-15 DIAGNOSIS — L73.2 HIDRADENITIS SUPPURATIVA: ICD-10-CM

## 2025-04-15 DIAGNOSIS — R82.90 ABNORMAL URINE ODOR: ICD-10-CM

## 2025-04-15 PROCEDURE — G8417 CALC BMI ABV UP PARAM F/U: HCPCS | Performed by: OBSTETRICS & GYNECOLOGY

## 2025-04-15 PROCEDURE — G8427 DOCREV CUR MEDS BY ELIG CLIN: HCPCS | Performed by: OBSTETRICS & GYNECOLOGY

## 2025-04-15 PROCEDURE — 1036F TOBACCO NON-USER: CPT | Performed by: OBSTETRICS & GYNECOLOGY

## 2025-04-15 PROCEDURE — 99214 OFFICE O/P EST MOD 30 MIN: CPT | Performed by: OBSTETRICS & GYNECOLOGY

## 2025-04-15 PROCEDURE — 3077F SYST BP >= 140 MM HG: CPT | Performed by: OBSTETRICS & GYNECOLOGY

## 2025-04-15 PROCEDURE — 3080F DIAST BP >= 90 MM HG: CPT | Performed by: OBSTETRICS & GYNECOLOGY

## 2025-04-15 RX ORDER — CHLORHEXIDINE GLUCONATE 40 MG/ML
SOLUTION TOPICAL DAILY
Qty: 473 ML | Refills: 5 | Status: SHIPPED | OUTPATIENT
Start: 2025-04-15

## 2025-04-15 RX ORDER — DOXYCYCLINE HYCLATE 100 MG
100 TABLET ORAL DAILY
Qty: 30 TABLET | Refills: 11 | Status: SHIPPED | OUTPATIENT
Start: 2025-04-15

## 2025-04-15 RX ORDER — CLINDAMYCIN PHOSPHATE 20 MG/G
1 CREAM VAGINAL NIGHTLY
Qty: 40 G | Refills: 0 | Status: SHIPPED | OUTPATIENT
Start: 2025-04-15 | End: 2025-04-20

## 2025-04-15 RX ORDER — METRONIDAZOLE 500 MG/1
500 TABLET ORAL 2 TIMES DAILY
Qty: 14 TABLET | Refills: 0 | Status: SHIPPED | OUTPATIENT
Start: 2025-04-15 | End: 2025-04-22

## 2025-04-15 SDOH — ECONOMIC STABILITY: FOOD INSECURITY: WITHIN THE PAST 12 MONTHS, YOU WORRIED THAT YOUR FOOD WOULD RUN OUT BEFORE YOU GOT MONEY TO BUY MORE.: NEVER TRUE

## 2025-04-15 SDOH — ECONOMIC STABILITY: FOOD INSECURITY: WITHIN THE PAST 12 MONTHS, THE FOOD YOU BOUGHT JUST DIDN'T LAST AND YOU DIDN'T HAVE MONEY TO GET MORE.: NEVER TRUE

## 2025-04-15 ASSESSMENT — PATIENT HEALTH QUESTIONNAIRE - PHQ9
2. FEELING DOWN, DEPRESSED OR HOPELESS: NOT AT ALL
SUM OF ALL RESPONSES TO PHQ QUESTIONS 1-9: 0
1. LITTLE INTEREST OR PLEASURE IN DOING THINGS: NOT AT ALL
SUM OF ALL RESPONSES TO PHQ QUESTIONS 1-9: 0

## 2025-04-15 NOTE — PROGRESS NOTES
Hernia: No hernia is present. There is no hernia in the left inguinal area or right inguinal area.   Genitourinary:     Exam position: Lithotomy position.      Pubic Area: No rash.       Labia:         Right: Lesion present. No rash or tenderness.         Left: Lesion present. No rash or tenderness.       Urethra: No prolapse, urethral pain, urethral swelling or urethral lesion.      Vagina: Vaginal discharge present. No erythema, tenderness, bleeding or lesions.      Uterus: Absent.       Adnexa: Right adnexa normal and left adnexa normal.        Right: No mass, tenderness or fullness.          Left: No mass, tenderness or fullness.        Rectum: No mass or anal fissure. Normal anal tone.      Comments: Mild HS  Musculoskeletal:      Cervical back: Normal range of motion and neck supple.   Lymphadenopathy:      Lower Body: No right inguinal adenopathy. No left inguinal adenopathy.   Skin:     General: Skin is warm and dry.   Neurological:      General: No focal deficit present.      Mental Status: She is alert and oriented to person, place, and time.   Psychiatric:         Mood and Affect: Mood normal.         Behavior: Behavior normal.         Thought Content: Thought content normal.         Judgment: Judgment normal.         No results found for this visit on 04/15/25.    ASSESSMENT AND PLAN   Diagnosis Orders   1. Vaginal odor  VAGINITIS PANEL PCR    C.trachomatis N.gonorrhoeae DNA    metroNIDAZOLE (FLAGYL) 500 MG tablet      2. Abnormal urine odor  Urinalysis with Microscopic    Culture, Urine      3. Female pelvic inflammatory disorders in diseases classified elsewhere  VAGINITIS PANEL PCR    C.trachomatis N.gonorrhoeae DNA    Urinalysis with Microscopic    Culture, Urine      4. Screening mammogram for breast cancer  RANDY ALEENA DIGITAL SCREEN BILATERAL PER PROTOCOL      5. Hidradenitis suppurativa  clindamycin (CLEOCIN) 2 % vaginal cream    chlorhexidine gluconate (ANTISEPTIC SKIN CLEANSER) 4 % SOLN external

## 2025-04-16 ENCOUNTER — RESULTS FOLLOW-UP (OUTPATIENT)
Dept: OBGYN | Age: 45
End: 2025-04-16

## 2025-04-16 LAB
BACTERIA URNS QL MICRO: ABNORMAL /HPF
BILIRUB UR QL STRIP.AUTO: NEGATIVE
BV BACTERIA DNA VAG QL NAA+PROBE: DETECTED
C GLABRATA DNA VAG QL NAA+PROBE: ABNORMAL
C GLABRATA DNA VAG QL NAA+PROBE: NOT DETECTED
C KRUSEI DNA VAG QL NAA+PROBE: NOT DETECTED
C TRACH DNA CVX QL NAA+PROBE: NEGATIVE
CANDIDA DNA VAG QL NAA+PROBE: NOT DETECTED
CLARITY UR: CLEAR
COLOR UR: YELLOW
EPI CELLS #/AREA URNS AUTO: 4 /HPF (ref 0–5)
GLUCOSE UR STRIP.AUTO-MCNC: NEGATIVE MG/DL
HGB UR QL STRIP.AUTO: NEGATIVE
HYALINE CASTS #/AREA URNS AUTO: 0 /LPF (ref 0–8)
KETONES UR STRIP.AUTO-MCNC: NEGATIVE MG/DL
LEUKOCYTE ESTERASE UR QL STRIP.AUTO: NEGATIVE
N GONORRHOEA DNA CERV MUCUS QL NAA+PROBE: NEGATIVE
NITRITE UR QL STRIP.AUTO: NEGATIVE
PH UR STRIP.AUTO: 6 [PH] (ref 5–8)
PROT UR STRIP.AUTO-MCNC: ABNORMAL MG/DL
RBC CLUMPS #/AREA URNS AUTO: 1 /HPF (ref 0–4)
SP GR UR STRIP.AUTO: 1.02 (ref 1–1.03)
T VAGINALIS DNA VAG QL NAA+PROBE: NOT DETECTED
UA DIPSTICK W REFLEX MICRO PNL UR: YES
URN SPEC COLLECT METH UR: ABNORMAL
UROBILINOGEN UR STRIP-ACNC: 0.2 E.U./DL
WBC #/AREA URNS AUTO: 1 /HPF (ref 0–5)

## 2025-04-16 RX ORDER — FLUCONAZOLE 150 MG/1
150 TABLET ORAL
Qty: 2 TABLET | Refills: 0 | Status: SHIPPED | OUTPATIENT
Start: 2025-04-16 | End: 2025-04-19

## 2025-04-16 RX ORDER — CLINDAMYCIN PHOSPHATE 10 UG/ML
LOTION TOPICAL
Qty: 60 G | Refills: 2 | Status: SHIPPED | OUTPATIENT
Start: 2025-04-16 | End: 2025-05-16

## 2025-04-17 LAB — BACTERIA UR CULT: NORMAL

## 2025-06-03 DIAGNOSIS — Z12.31 SCREENING MAMMOGRAM FOR BREAST CANCER: ICD-10-CM

## 2025-06-16 ENCOUNTER — OFFICE VISIT (OUTPATIENT)
Dept: OBGYN | Age: 45
End: 2025-06-16
Payer: COMMERCIAL

## 2025-06-16 VITALS
DIASTOLIC BLOOD PRESSURE: 117 MMHG | WEIGHT: 250 LBS | SYSTOLIC BLOOD PRESSURE: 172 MMHG | HEIGHT: 68 IN | BODY MASS INDEX: 37.89 KG/M2

## 2025-06-16 DIAGNOSIS — R30.0 DYSURIA: ICD-10-CM

## 2025-06-16 DIAGNOSIS — Z01.419 WOMEN'S ANNUAL ROUTINE GYNECOLOGICAL EXAMINATION: Primary | ICD-10-CM

## 2025-06-16 DIAGNOSIS — N89.8 VAGINAL DISCHARGE: ICD-10-CM

## 2025-06-16 DIAGNOSIS — Z11.3 SCREENING EXAMINATION FOR VENEREAL DISEASE: ICD-10-CM

## 2025-06-16 DIAGNOSIS — N89.8 VAGINAL IRRITATION: ICD-10-CM

## 2025-06-16 DIAGNOSIS — I10 ESSENTIAL HYPERTENSION: ICD-10-CM

## 2025-06-16 PROCEDURE — 3077F SYST BP >= 140 MM HG: CPT | Performed by: NURSE PRACTITIONER

## 2025-06-16 PROCEDURE — 99396 PREV VISIT EST AGE 40-64: CPT | Performed by: NURSE PRACTITIONER

## 2025-06-16 PROCEDURE — 3080F DIAST BP >= 90 MM HG: CPT | Performed by: NURSE PRACTITIONER

## 2025-06-16 SDOH — ECONOMIC STABILITY: INCOME INSECURITY: IN THE LAST 12 MONTHS, WAS THERE A TIME WHEN YOU WERE NOT ABLE TO PAY THE MORTGAGE OR RENT ON TIME?: NO

## 2025-06-16 SDOH — ECONOMIC STABILITY: FOOD INSECURITY: WITHIN THE PAST 12 MONTHS, YOU WORRIED THAT YOUR FOOD WOULD RUN OUT BEFORE YOU GOT MONEY TO BUY MORE.: NEVER TRUE

## 2025-06-16 SDOH — ECONOMIC STABILITY: FOOD INSECURITY: WITHIN THE PAST 12 MONTHS, THE FOOD YOU BOUGHT JUST DIDN'T LAST AND YOU DIDN'T HAVE MONEY TO GET MORE.: NEVER TRUE

## 2025-06-16 ASSESSMENT — ENCOUNTER SYMPTOMS
VOMITING: 0
ABDOMINAL PAIN: 0
GASTROINTESTINAL NEGATIVE: 1
RESPIRATORY NEGATIVE: 1
CONSTIPATION: 0
NAUSEA: 0
SHORTNESS OF BREATH: 0
DIARRHEA: 0

## 2025-06-16 NOTE — PROGRESS NOTES
oriented to person, place, and time.   Psychiatric:         Attention and Perception: Attention normal.         Mood and Affect: Mood normal.         Speech: Speech normal.         Behavior: Behavior is cooperative.         Cognition and Memory: Cognition normal.         Judgment: Judgment normal.         No results found for this visit on 06/16/25.    Assessment and Plan   Diagnosis Orders   1. Women's annual routine gynecological examination        2. Dysuria   -urine sent for culture Culture, Urine      3. Vaginal discharge  VAGINITIS PANEL PCR      4. Screening examination for venereal disease   -cultures collected C.trachomatis N.gonorrhoeae DNA      5. Vaginal irritation   -cultures collected VAGINITIS PANEL PCR    C.trachomatis N.gonorrhoeae DNA      6. Essential hypertension   -BP discussed, pt st's she hasn't taken her Antihypertensive yet this AM. Denies HA, chest/abd pain and SOB.            Return in about 1 year (around 6/16/2026), or if symptoms worsen or fail to improve.    Padmini Catherine, JOANNE - CNP

## 2025-06-17 ENCOUNTER — RESULTS FOLLOW-UP (OUTPATIENT)
Dept: OBGYN | Age: 45
End: 2025-06-17

## 2025-06-17 LAB
BACTERIA UR CULT: NORMAL
BV BACTERIA DNA VAG QL NAA+PROBE: NOT DETECTED
C GLABRATA DNA VAG QL NAA+PROBE: ABNORMAL
C GLABRATA DNA VAG QL NAA+PROBE: NOT DETECTED
C KRUSEI DNA VAG QL NAA+PROBE: NOT DETECTED
C TRACH DNA CVX QL NAA+PROBE: NEGATIVE
CANDIDA DNA VAG QL NAA+PROBE: DETECTED
N GONORRHOEA DNA CERV MUCUS QL NAA+PROBE: NEGATIVE
T VAGINALIS DNA VAG QL NAA+PROBE: NOT DETECTED

## 2025-06-17 RX ORDER — FLUCONAZOLE 150 MG/1
150 TABLET ORAL ONCE
Qty: 2 TABLET | Refills: 0 | Status: SHIPPED | OUTPATIENT
Start: 2025-06-17 | End: 2025-06-17

## 2025-07-22 ENCOUNTER — OFFICE VISIT (OUTPATIENT)
Dept: OBGYN | Age: 45
End: 2025-07-22
Payer: COMMERCIAL

## 2025-07-22 VITALS
BODY MASS INDEX: 39.23 KG/M2 | DIASTOLIC BLOOD PRESSURE: 114 MMHG | HEART RATE: 95 BPM | SYSTOLIC BLOOD PRESSURE: 157 MMHG | WEIGHT: 258 LBS

## 2025-07-22 DIAGNOSIS — N74 FEMALE PELVIC INFLAMMATORY DISORDERS IN DISEASES CLASSIFIED ELSEWHERE: ICD-10-CM

## 2025-07-22 DIAGNOSIS — R10.2 PELVIC CRAMPING: ICD-10-CM

## 2025-07-22 DIAGNOSIS — N89.8 VAGINAL DISCHARGE: Primary | ICD-10-CM

## 2025-07-22 DIAGNOSIS — R30.0 DYSURIA: ICD-10-CM

## 2025-07-22 PROCEDURE — 99214 OFFICE O/P EST MOD 30 MIN: CPT | Performed by: OBSTETRICS & GYNECOLOGY

## 2025-07-22 PROCEDURE — 3080F DIAST BP >= 90 MM HG: CPT | Performed by: OBSTETRICS & GYNECOLOGY

## 2025-07-22 PROCEDURE — 3077F SYST BP >= 140 MM HG: CPT | Performed by: OBSTETRICS & GYNECOLOGY

## 2025-07-22 PROCEDURE — G8427 DOCREV CUR MEDS BY ELIG CLIN: HCPCS | Performed by: OBSTETRICS & GYNECOLOGY

## 2025-07-22 PROCEDURE — G8417 CALC BMI ABV UP PARAM F/U: HCPCS | Performed by: OBSTETRICS & GYNECOLOGY

## 2025-07-22 PROCEDURE — 1036F TOBACCO NON-USER: CPT | Performed by: OBSTETRICS & GYNECOLOGY

## 2025-07-22 RX ORDER — FLUCONAZOLE 150 MG/1
150 TABLET ORAL
Qty: 2 TABLET | Refills: 0 | Status: SHIPPED | OUTPATIENT
Start: 2025-07-22 | End: 2025-07-25

## 2025-07-22 RX ORDER — METRONIDAZOLE 500 MG/1
500 TABLET ORAL 2 TIMES DAILY
Qty: 14 TABLET | Refills: 0 | Status: SHIPPED | OUTPATIENT
Start: 2025-07-22 | End: 2025-07-29

## 2025-07-22 NOTE — PROGRESS NOTES
25    Dakota Ho  1980    Chief Complaint   Patient presents with    other     Pt c/o burning w/ urination. Pt c/o vaginal odor.         Dakota Ho is a 45 y.o. female who presents today for evaluation of vaginal discharge and irritation    Past Medical History:   Diagnosis Date    Acute cholecystitis 2018    Acute post-operative pain 2018    Burning with urination     Excessive daytime sleepiness 2017    Fibromyalgia     GERD (gastroesophageal reflux disease)     Hernia of abdominal wall     Herpes simplex virus (HSV) infection     Hypertension     Hypertension     Morbid obesity (HCC) 2017    Obesity     Urinary frequency     UTI (urinary tract infection)     Vaginal discharge     Vaginal irritation     Vaginal odor        Past Surgical History:   Procedure Laterality Date     SECTION       &     CHOLECYSTECTOMY  08/15/2018    laprascopic    COLONOSCOPY      DILATION AND CURETTAGE      ENDOMETRIAL ABLATION      HERNIA REPAIR Right 2021    ROBOTIC EXPLORATION, HERNIA RIGHT INGUINAL REPAIR AND VENTRAL HERNIA REPAIR LAPAROSCOPIC performed by Alma Juárez MD at [facility] OR    HYSTERECTOMY (CERVIX STATUS UNKNOWN)      [facility]    OVARY REMOVAL Right     SALPINGECTOMY Bilateral     UPPER GASTROINTESTINAL ENDOSCOPY  2017       Social History     Tobacco Use    Smoking status: Never    Smokeless tobacco: Never   Vaping Use    Vaping status: Never Used   Substance Use Topics    Alcohol use: Yes     Comment: social. caffeine: 1 soda a week, 3 coffee weekly    Drug use: No       Family History   Problem Relation Age of Onset    Hypertension Mother     Hypertension Father     Hearing Loss Brother     High Blood Pressure Maternal Grandmother     Arthritis Maternal Grandfather     Breast Cancer Neg Hx     Ovarian Cancer Neg Hx        Current Outpatient Medications   Medication Sig Dispense Refill    Boric Acid 600 MG SUPP Place 600 mg vaginally

## 2025-07-23 LAB
A BAUMANNII DNA SPEC QL NAA+PROBE: NOT DETECTED
BACTERIA URNS QL MICRO: NORMAL /HPF
BILIRUB UR QL STRIP.AUTO: NEGATIVE
BV BACTERIA DNA VAG QL NAA+PROBE: NOT DETECTED
C GLABRATA DNA VAG QL NAA+PROBE: NORMAL
C GLABRATA DNA VAG QL NAA+PROBE: NOT DETECTED
C KRUSEI DNA VAG QL NAA+PROBE: NOT DETECTED
CANDIDA DNA VAG QL NAA+PROBE: NOT DETECTED
CARBAPENEM RESISTANCE OXA-48 GENE BY PCR: NOT DETECTED
CEPHALOSPORIN RESISTANCE AMPC GENE: NOT DETECTED
CLARITY UR: CLEAR
COLOR UR: YELLOW
ENTEROCOC DNA SPEC QL NAA+PROBE: NOT DETECTED
EPI CELLS #/AREA URNS AUTO: 2 /HPF (ref 0–5)
ESBL RESISTANCE: NOT DETECTED
GLUCOSE UR STRIP.AUTO-MCNC: NEGATIVE MG/DL
GP B STREP DNA SPEC QL NAA+PROBE: NOT DETECTED
HGB UR QL STRIP.AUTO: NEGATIVE
HYALINE CASTS #/AREA URNS AUTO: 0 /LPF (ref 0–8)
K PNEUMON DNA SPEC QL NAA+PROBE: NORMAL
KETONES UR STRIP.AUTO-MCNC: NEGATIVE MG/DL
LEUKOCYTE ESTERASE UR QL STRIP.AUTO: NEGATIVE
MACROLIDE RESISTANCE: NORMAL
METHICILLIN RESISTANCE: NOT DETECTED
MRSA DNA SPEC QL NAA+PROBE: NOT DETECTED
NITRITE UR QL STRIP.AUTO: NEGATIVE
OTHER MICROORG DNA SPEC QL NAA+PROBE: NOT DETECTED
P AERUGINOSA DNA SPEC QL NAA+PROBE: NOT DETECTED
P MIRABILIS DNA SPEC QL NAA+PROBE: NOT DETECTED
PH UR STRIP.AUTO: 7.5 [PH] (ref 5–8)
PROT UR STRIP.AUTO-MCNC: NEGATIVE MG/DL
QUINOLONE AND FLUOROQUINOLONE RESISTANCE: NOT DETECTED
RBC CLUMPS #/AREA URNS AUTO: 2 /HPF (ref 0–4)
S PYO RRNA SPEC QL PROBE: NOT DETECTED
SP GR UR STRIP.AUTO: 1.02 (ref 1–1.03)
T VAGINALIS DNA VAG QL NAA+PROBE: NOT DETECTED
TETRACYCLINE RESISTANCE: NORMAL
TRIMETHOPRIM/SULFONAMIDE RESISTANCE: NORMAL
UA DIPSTICK W REFLEX MICRO PNL UR: NORMAL
URN SPEC COLLECT METH UR: NORMAL
UROBILINOGEN UR STRIP-ACNC: 0.2 E.U./DL
WBC #/AREA URNS AUTO: 2 /HPF (ref 0–5)

## 2025-07-24 LAB — BACTERIA UR CULT: NORMAL

## 2025-07-25 LAB
M GENITALIUM DNA SPEC QL NAA+PROBE: NOT DETECTED
M HOMINIS DNA SPEC QL NAA+PROBE: NOT DETECTED
SPECIMEN SOURCE: ABNORMAL
U PARVUM DNA SPEC QL NAA+PROBE: DETECTED
U UREALYTICUM DNA SPEC QL NAA+PROBE: NOT DETECTED

## 2025-07-28 ENCOUNTER — OFFICE VISIT (OUTPATIENT)
Dept: FAMILY MEDICINE CLINIC | Age: 45
End: 2025-07-28
Payer: COMMERCIAL

## 2025-07-28 VITALS
WEIGHT: 253 LBS | DIASTOLIC BLOOD PRESSURE: 86 MMHG | BODY MASS INDEX: 38.34 KG/M2 | OXYGEN SATURATION: 94 % | HEART RATE: 72 BPM | SYSTOLIC BLOOD PRESSURE: 124 MMHG | HEIGHT: 68 IN

## 2025-07-28 DIAGNOSIS — H10.32 ACUTE BACTERIAL CONJUNCTIVITIS OF LEFT EYE: Primary | ICD-10-CM

## 2025-07-28 PROCEDURE — 99213 OFFICE O/P EST LOW 20 MIN: CPT | Performed by: STUDENT IN AN ORGANIZED HEALTH CARE EDUCATION/TRAINING PROGRAM

## 2025-07-28 PROCEDURE — 3074F SYST BP LT 130 MM HG: CPT | Performed by: STUDENT IN AN ORGANIZED HEALTH CARE EDUCATION/TRAINING PROGRAM

## 2025-07-28 PROCEDURE — G8417 CALC BMI ABV UP PARAM F/U: HCPCS | Performed by: STUDENT IN AN ORGANIZED HEALTH CARE EDUCATION/TRAINING PROGRAM

## 2025-07-28 PROCEDURE — G8427 DOCREV CUR MEDS BY ELIG CLIN: HCPCS | Performed by: STUDENT IN AN ORGANIZED HEALTH CARE EDUCATION/TRAINING PROGRAM

## 2025-07-28 PROCEDURE — 3079F DIAST BP 80-89 MM HG: CPT | Performed by: STUDENT IN AN ORGANIZED HEALTH CARE EDUCATION/TRAINING PROGRAM

## 2025-07-28 PROCEDURE — 1036F TOBACCO NON-USER: CPT | Performed by: STUDENT IN AN ORGANIZED HEALTH CARE EDUCATION/TRAINING PROGRAM

## 2025-07-28 RX ORDER — CIPROFLOXACIN HYDROCHLORIDE 3.5 MG/ML
1 SOLUTION/ DROPS TOPICAL
Qty: 1 EACH | Refills: 0 | Status: SHIPPED | OUTPATIENT
Start: 2025-07-28 | End: 2025-08-02

## 2025-08-04 ASSESSMENT — ENCOUNTER SYMPTOMS
WHEEZING: 0
SORE THROAT: 0
NAUSEA: 0
SHORTNESS OF BREATH: 0
ABDOMINAL PAIN: 0

## (undated) DEVICE — TOTAL TRAY, DB, 100% SILI FOLEY, 16FR 10: Brand: MEDLINE

## (undated) DEVICE — GLOVE ORANGE PI 7   MSG9070

## (undated) DEVICE — EXCEL 10FT (3.05 M) INSUFFLATION TUBING SET WITH 0.1 MICRON FILTER: Brand: EXCEL

## (undated) DEVICE — 3M™ IOBAN™ 2 ANTIMICROBIAL INCISE DRAPE 6650EZ: Brand: IOBAN™ 2

## (undated) DEVICE — SUTURE ABSORBABLE BRAIDED 4-0 FS-2 18 IN VIO SLV VICRYL J392H

## (undated) DEVICE — BLADELESS OBTURATOR: Brand: WECK VISTA

## (undated) DEVICE — GOWN,ECLIPSE,POLYRNF,BRTHSLV,L,30/CS: Brand: MEDLINE

## (undated) DEVICE — Z INACTIVE USE 2735373 APPLICATOR FBR LAIN COT WOOD TIP ECONOMICAL

## (undated) DEVICE — COVER,MAYO STAND,STERILE: Brand: MEDLINE

## (undated) DEVICE — DRAPE SHEET ULTRAGARD: Brand: MEDLINE

## (undated) DEVICE — SUTURE VCRL SZ 4-0 L27IN ABSRB UD L19MM FS-2 3/8 CIR REV J422H

## (undated) DEVICE — SOLUTION IRRIG 1000ML STRL H2O USP PLAS POUR BTL

## (undated) DEVICE — APPLICATOR MEDICATED 26 CC SOLUTION HI LT ORNG CHLORAPREP

## (undated) DEVICE — GLOVE SURG SZ 6 THK91MIL LTX FREE SYN POLYISOPRENE ANTI

## (undated) DEVICE — TROCAR: Brand: KII FIOS FIRST ENTRY

## (undated) DEVICE — ARM DRAPE

## (undated) DEVICE — BINDER ABD UNISX 4 PNL PREM 6INX6INX12IN L XL 4

## (undated) DEVICE — ADHESIVE SKIN CLSR 0.7ML TOP DERMBND ADV

## (undated) DEVICE — BANDAGE ADH W2XL4IN NITRL FAB STRP CURAD

## (undated) DEVICE — CANNULA SEAL

## (undated) DEVICE — TIP COVER ACCESSORY

## (undated) DEVICE — ELECTRODE ES AD CRDLSS PT RET REM POLYHESIVE

## (undated) DEVICE — SUTURE VCRL SZ 1 L27IN ABSRB VLT L26MM CT-2 1/2 CIR J335H

## (undated) DEVICE — COLUMN DRAPE

## (undated) DEVICE — SYRINGE MED 20ML STD CLR PLAS LUERLOCK TIP N CTRL DISP

## (undated) DEVICE — BLADE CLIPPER GEN PURP NS

## (undated) DEVICE — SUTURE V-LOC 180 SZ 3-0 L12IN ABSRB GRN V-20 L26MM 1/2 CIR VLOCL0614